# Patient Record
Sex: FEMALE | Race: WHITE | Employment: UNEMPLOYED | URBAN - METROPOLITAN AREA
[De-identification: names, ages, dates, MRNs, and addresses within clinical notes are randomized per-mention and may not be internally consistent; named-entity substitution may affect disease eponyms.]

---

## 2021-01-19 ENCOUNTER — PROCEDURE VISIT (OUTPATIENT)
Dept: AUDIOLOGY | Age: 61
End: 2021-01-19
Payer: COMMERCIAL

## 2021-01-19 ENCOUNTER — OFFICE VISIT (OUTPATIENT)
Dept: ENT CLINIC | Age: 61
End: 2021-01-19
Payer: COMMERCIAL

## 2021-01-19 VITALS
WEIGHT: 181.6 LBS | BODY MASS INDEX: 27.52 KG/M2 | HEIGHT: 68 IN | DIASTOLIC BLOOD PRESSURE: 75 MMHG | SYSTOLIC BLOOD PRESSURE: 111 MMHG | HEART RATE: 71 BPM

## 2021-01-19 DIAGNOSIS — H69.83 DYSFUNCTION OF BOTH EUSTACHIAN TUBES: Primary | ICD-10-CM

## 2021-01-19 DIAGNOSIS — H69.83 EUSTACHIAN TUBE DYSFUNCTION, BILATERAL: Primary | ICD-10-CM

## 2021-01-19 DIAGNOSIS — H65.93 BILATERAL SEROUS OTITIS MEDIA, UNSPECIFIED CHRONICITY: ICD-10-CM

## 2021-01-19 DIAGNOSIS — R09.82 POST-NASAL DRAINAGE: ICD-10-CM

## 2021-01-19 PROCEDURE — 99203 OFFICE O/P NEW LOW 30 MIN: CPT | Performed by: NURSE PRACTITIONER

## 2021-01-19 PROCEDURE — 92567 TYMPANOMETRY: CPT | Performed by: AUDIOLOGIST

## 2021-01-19 RX ORDER — MULTIVIT-MIN/IRON/FOLIC ACID/K 18-600-40
CAPSULE ORAL DAILY
COMMUNITY
End: 2021-08-04 | Stop reason: ALTCHOICE

## 2021-01-19 RX ORDER — NICOTINE POLACRILEX 2 MG
GUM BUCCAL DAILY
COMMUNITY
End: 2021-08-04 | Stop reason: ALTCHOICE

## 2021-01-19 RX ORDER — MULTIVIT WITH MINERALS/LUTEIN
250 TABLET ORAL DAILY
COMMUNITY
End: 2021-08-04 | Stop reason: ALTCHOICE

## 2021-01-19 RX ORDER — AZELASTINE 1 MG/ML
2 SPRAY, METERED NASAL 2 TIMES DAILY
Qty: 1 BOTTLE | Refills: 1 | Status: SHIPPED
Start: 2021-01-19 | End: 2022-10-13 | Stop reason: SDUPTHER

## 2021-01-19 RX ORDER — UBIDECARENONE 75 MG
50 CAPSULE ORAL DAILY
COMMUNITY
End: 2021-08-04 | Stop reason: ALTCHOICE

## 2021-01-19 SDOH — HEALTH STABILITY: MENTAL HEALTH: HOW MANY STANDARD DRINKS CONTAINING ALCOHOL DO YOU HAVE ON A TYPICAL DAY?: 3 OR 4

## 2021-01-19 SDOH — HEALTH STABILITY: MENTAL HEALTH: HOW OFTEN DO YOU HAVE A DRINK CONTAINING ALCOHOL?: NOT ASKED

## 2021-01-19 ASSESSMENT — ENCOUNTER SYMPTOMS
RHINORRHEA: 1
SINUS PRESSURE: 0
SHORTNESS OF BREATH: 0
EYES NEGATIVE: 1
RESPIRATORY NEGATIVE: 1
STRIDOR: 0
SINUS PAIN: 0

## 2021-01-19 NOTE — PROGRESS NOTES
This patient was referred for audiometric/tympanometric testing by MITA Díaz due to Eustachian tube dysfunction. Patient currently wears hearing aids purchased elsewhere. She reported noticing a decrease in hearing and a rushing sound when she blows her nose. She denied any dizziness. Tympanometry revealed flat tympanograms, bilaterally. The results were reviewed with the patient. Recommendations for follow up will be made pending physician consult.       Leo Nguyen [facility]  [address] [facility] [id number]  Electronically signed by Leo Nguyen on 1/19/2021 at 1:51 PM

## 2021-01-19 NOTE — PROGRESS NOTES
[facility] Otolaryngology  Dr. Chiki Hobson. MADHAVI Garcia Ms.Ed. New Consult       Patient Name:  Marsha Castro  :  1960     CHIEF C/O:    Chief Complaint   Patient presents with    Ear Problem     went to get hearing aides cleaned and hearing is decreased. put on claritin, flonase, rinse whhich made things worse        HISTORY OBTAINED FROM:  patient    HISTORY OF PRESENT ILLNESS:       Evangelina Busby is a 64y.o. year old female, here today for worsened hearing loss. She states her symptoms began 2 months ago. She was seen by her PCP and placed on Flonase, Claritin, and nasal saline sinus rinses. She states she noticed no difference and felt like the medications made her symptoms worse. She did stop the Claritin but she has continued to use Flonase, 1 spray each nostril once daily with intermittent use of nasal saline sinus rinses. Initially she felt like she had fluid in her ears which would pop and crack especially when blowing her nose. She does complain of chronic sinus issues including postnasal drainage and chronic rhinorrhea. Denies any sinus pain or pressure. She denies any ear pain or pressure. She does have a family history of hearing loss including her twin sister. She denies any history of ear infections or previous ear surgeries. No past medical history on file. No past surgical history on file.     Current Outpatient Medications:     Cholecalciferol (VITAMIN D) 50 MCG (2000) CAPS capsule, Take by mouth, Disp: , Rfl:     QUERCETIN PO, Take by mouth, Disp: , Rfl:     Isaac, Zingiber officinalis, (ISAAC PO), Take by mouth With black pepper, Disp: , Rfl:     Ascorbic Acid (VITAMIN C) 250 MG tablet, Take 250 mg by mouth daily, Disp: , Rfl:     vitamin B-12 (CYANOCOBALAMIN) 100 MCG tablet, Take 50 mcg by mouth daily, Disp: , Rfl:     Biotin 1 MG CAPS, Take by mouth, Disp: , Rfl:     Budesonide-Formoterol Fumarate (SYMBICORT IN), Inhale into the lungs Trying to get on good rx, Disp: , Rfl:   Adhesive tape, Augmentin [amoxicillin-pot clavulanate], Bactrim [sulfamethoxazole-trimethoprim], Cipro [ciprofloxacin hcl], Doxycycline, Iv dye [iodides], Sulfa antibiotics, Tetracyclines & related, and Vancomycin  Social History     Tobacco Use    Smoking status: Current Every Day Smoker     Packs/day: 0.50     Years: 30.00     Pack years: 15.00     Types: Cigarettes    Smokeless tobacco: Never Used   Substance Use Topics    Alcohol use: Yes     Alcohol/week: 4.0 standard drinks     Types: 4 Glasses of wine per week     Drinks per session: 3 or 4    Drug use: Never     No family history on file. Review of Systems   Constitutional: Negative. Negative for activity change and appetite change. HENT: Positive for hearing loss, postnasal drip and rhinorrhea. Negative for congestion, ear discharge, ear pain, sinus pressure and sinus pain. Eyes: Negative. Respiratory: Negative. Negative for shortness of breath and stridor. Cardiovascular: Negative. Negative for chest pain and palpitations. Endocrine: Negative. Genitourinary: Negative. Musculoskeletal: Negative. Skin: Negative. Neurological: Negative. Negative for dizziness. Hematological: Negative. Psychiatric/Behavioral: Negative. /75 (Site: Left Upper Arm, Position: Sitting, Cuff Size: Large Adult)   Pulse 71   Ht 5' 8\" (1.727 m)   Wt 181 lb 9.6 oz (82.4 kg)   BMI 27.61 kg/m²   Physical Exam  Constitutional:       Appearance: Normal appearance. HENT:      Head: Normocephalic. Right Ear: Ear canal and external ear normal. Decreased hearing noted. A middle ear effusion is present. Left Ear: Ear canal and external ear normal. Decreased hearing noted. A middle ear effusion is present. Nose: Nose normal. No congestion or rhinorrhea. Right Turbinates: Not swollen or pale. Left Turbinates: Not swollen or pale. Mouth/Throat:      Lips: Pink.       Mouth: Mucous membranes are moist.   Eyes:      Conjunctiva/sclera: Conjunctivae normal.      Pupils: Pupils are equal, round, and reactive to light. Neck:      Musculoskeletal: Normal range of motion. No neck rigidity or muscular tenderness. Cardiovascular:      Rate and Rhythm: Normal rate and regular rhythm. Pulses: Normal pulses. Pulmonary:      Effort: Pulmonary effort is normal. No respiratory distress. Breath sounds: No stridor. Musculoskeletal: Normal range of motion. Skin:     General: Skin is warm and dry. Neurological:      General: No focal deficit present. Mental Status: She is alert and oriented to person, place, and time. Psychiatric:         Mood and Affect: Mood normal.         Behavior: Behavior normal.         Thought Content: Thought content normal.         Judgment: Judgment normal.           Tympanogram reviewed with patient. Flat curves bilaterally. IMPRESSION/PLAN:      Eamon Ocasio was seen today for ear problem. Diagnoses and all orders for this visit:    Dysfunction of both eustachian tubes    Bilateral serous otitis media, unspecified chronicity    Post-nasal drainage    Other orders  -     azelastine (ASTELIN) 0.1 % nasal spray; 2 sprays by Nasal route 2 times daily Use in each nostril as directed      Eamon Ocasio is seen today for complaint of worsening hearing loss. Upon exam she is found to have significant middle ear effusions bilaterally. There is no erythema or sign of infection. Bilateral ear canals are without erythema or edema. Sinuses are patent without considerable swelling or paleness of the inferior turbinates. There is no rhinorrhea present but small amount of postnasal drainage is observed in the oropharynx. At this time the patient is to increase her Flonase to 2 sprays each nostril twice daily for 2 weeks and then once daily thereafter.   She will also be placed on Astelin spray, 2 sprays each nostril once daily at bedtime and increase to twice daily if it is improving symptoms. She will follow-up in 6 weeks. It is recommended to the patient that if no improvement is seen at this time a diagnostic myringotomy performed in the office for relief of symptoms. She is instructed to call with any new or worsening of symptoms prior to her next appointment.     SUYAPA Walker, FNP-C  [address] [facility], Nose and Throat    The information contained in this note has been dictated using drug and medical speech recognition software and may contain errors

## 2021-02-23 ENCOUNTER — PROCEDURE VISIT (OUTPATIENT)
Dept: AUDIOLOGY | Age: 61
End: 2021-02-23
Payer: COMMERCIAL

## 2021-02-23 ENCOUNTER — OFFICE VISIT (OUTPATIENT)
Dept: ENT CLINIC | Age: 61
End: 2021-02-23
Payer: COMMERCIAL

## 2021-02-23 VITALS
DIASTOLIC BLOOD PRESSURE: 82 MMHG | SYSTOLIC BLOOD PRESSURE: 114 MMHG | WEIGHT: 181 LBS | HEIGHT: 68 IN | BODY MASS INDEX: 27.43 KG/M2

## 2021-02-23 DIAGNOSIS — H69.83 DYSFUNCTION OF BOTH EUSTACHIAN TUBES: Primary | ICD-10-CM

## 2021-02-23 DIAGNOSIS — H69.83 EUSTACHIAN TUBE DYSFUNCTION, BILATERAL: Primary | ICD-10-CM

## 2021-02-23 DIAGNOSIS — H65.93 BILATERAL SEROUS OTITIS MEDIA, UNSPECIFIED CHRONICITY: ICD-10-CM

## 2021-02-23 PROCEDURE — 99214 OFFICE O/P EST MOD 30 MIN: CPT | Performed by: NURSE PRACTITIONER

## 2021-02-23 PROCEDURE — 92567 TYMPANOMETRY: CPT | Performed by: AUDIOLOGIST

## 2021-02-23 ASSESSMENT — ENCOUNTER SYMPTOMS
RESPIRATORY NEGATIVE: 1
SHORTNESS OF BREATH: 0
EYES NEGATIVE: 1
STRIDOR: 0

## 2021-02-23 NOTE — PROGRESS NOTES
[address] [facility] Otolaryngology  Dr. Kim Healy. Miguel Check. Ms.Ed        Patient Name:  Violeta Parsons  :  1960     CHIEF C/O:    Chief Complaint   Patient presents with    Follow-up     bl ear clogged       HISTORY OBTAINED FROM:  patient    HISTORY OF PRESENT ILLNESS:       Elmo Hernandez is a 64y.o. year old female, here today for follow up of fullness in bilateral ears. She states her symptoms have been continuous and unchanged for the past 3 months. She continues to use Flonase and Astelin spray with no change to her symptoms. She continues to complain of muffled hearing bilaterally which is becoming very frustrating to her. She continues to have pressure in both ears. She states that she is able to hear some with her hearing aids and but still struggles. She denies any new pain or drainage. She denies any dizziness. History reviewed. No pertinent past medical history. History reviewed. No pertinent surgical history.     Current Outpatient Medications:     Cholecalciferol (VITAMIN D) 50 MCG ( UT) CAPS capsule, Take by mouth, Disp: , Rfl:     QUERCETIN PO, Take by mouth, Disp: , Rfl:     Ascorbic Acid (VITAMIN C) 250 MG tablet, Take 250 mg by mouth daily, Disp: , Rfl:     Biotin 1 MG CAPS, Take by mouth, Disp: , Rfl:     azelastine (ASTELIN) 0.1 % nasal spray, 2 sprays by Nasal route 2 times daily Use in each nostril as directed, Disp: 1 Bottle, Rfl: 1    Isaac, Zingiber officinalis, (ISAAC PO), Take by mouth With black pepper, Disp: , Rfl:     vitamin B-12 (CYANOCOBALAMIN) 100 MCG tablet, Take 50 mcg by mouth daily, Disp: , Rfl:     Budesonide-Formoterol Fumarate (SYMBICORT IN), Inhale into the lungs Trying to get on good rx, Disp: , Rfl:   Adhesive tape, Augmentin [amoxicillin-pot clavulanate], Bactrim [sulfamethoxazole-trimethoprim], Cipro [ciprofloxacin hcl], Doxycycline, Iv dye [iodides], Sulfa antibiotics, Tetracyclines & related, and Vancomycin  Social History     Tobacco Use    Smoking status: Current Every Day Smoker     Packs/day: 0.50     Years: 30.00     Pack years: 15.00     Types: Cigarettes    Smokeless tobacco: Never Used   Substance Use Topics    Alcohol use: Yes     Alcohol/week: 4.0 standard drinks     Types: 4 Glasses of wine per week     Drinks per session: 3 or 4    Drug use: Never     History reviewed. No pertinent family history. Review of Systems   Constitutional: Negative. Negative for activity change and appetite change. HENT: Positive for ear pain (Pressure), hearing loss and postnasal drip. Eyes: Negative. Respiratory: Negative. Negative for shortness of breath and stridor. Cardiovascular: Negative. Negative for chest pain and palpitations. Endocrine: Negative. Genitourinary: Negative. Musculoskeletal: Negative. Skin: Negative. Neurological: Negative. Negative for dizziness. Hematological: Negative. Psychiatric/Behavioral: Negative. /82 (Site: Right Upper Arm, Position: Sitting, Cuff Size: Large Adult)   Ht 5' 8\" (1.727 m)   Wt 181 lb (82.1 kg)   BMI 27.52 kg/m²   Physical Exam  Constitutional:       Appearance: Normal appearance. HENT:      Head: Normocephalic. Right Ear: Ear canal and external ear normal. Decreased hearing noted. A middle ear effusion is present. Left Ear: Ear canal and external ear normal. Decreased hearing noted. A middle ear effusion is present. Nose: Nose normal. No congestion or rhinorrhea. Right Turbinates: Not pale. Left Turbinates: Not pale. Mouth/Throat:      Lips: Pink. Mouth: Mucous membranes are moist.     Eyes:      Conjunctiva/sclera: Conjunctivae normal.      Pupils: Pupils are equal, round, and reactive to light. Neck:      Musculoskeletal: Normal range of motion. No neck rigidity or muscular tenderness. Cardiovascular:      Rate and Rhythm: Normal rate and regular rhythm. Pulses: Normal pulses.    Pulmonary:      Effort: Pulmonary effort is normal. No respiratory distress. Breath sounds: No stridor. Musculoskeletal: Normal range of motion. Skin:     General: Skin is warm and dry. Neurological:      General: No focal deficit present. Mental Status: She is alert and oriented to person, place, and time. Psychiatric:         Mood and Affect: Mood normal.         Behavior: Behavior normal.         Thought Content: Thought content normal.         Judgment: Judgment normal.           Tympanogram reviewed with patient. Flat curves bilaterally. IMPRESSION/PLAN:    Mendel Galley was seen today for follow-up. Diagnoses and all orders for this visit:    Bilateral serous otitis media, unspecified chronicity    Dysfunction of both eustachian tubes      Mendel Galley is seen today for follow-up for chronic otitis media with effusion. Upon exam bilateral TMs show sign of middle ear effusion without other sign of infection. There is no erythema or edema of the ear canal.  Sinuses are patent without considerable swelling at this time. There is mild postnasal drainage remaining in the oropharynx. Options for further treatment are discussed with the patient including diagnostic myringotomy in the office versus bilateral myringotomy with tympanostomy tube placement and possible eustachian tube balloon dilation. Risks and benefits of these procedures are discussed with the patient who would like to proceed at this time with BMT with eustachian tube balloon dilation. She will be scheduled with Dr. Isaak Potter at [facility] surgery center for her procedure with follow-up 1 week after the procedure. Until that time she is to continue with her current regimen of Flonase and Astelin sprays as well as nasal saline sinus rinses. She is instructed to call with any new or worsening of symptoms prior to her procedure.     Verna Simon, MSN, FNP-C  8 [facility], Nose and Throat    The information contained in this note has been dictated using drug and medical speech recognition software and may contain errors

## 2021-02-23 NOTE — PROGRESS NOTES
This patient was referred for audiometric/tympanometric testing by MITA Díaz due to Eustachian tube dysfunction, bilateral. Patient reported no improvement in symptoms. Tympanometry revealed negative middle ear pressure (-255 daPa), in the right ear and negative middle ear pressure (-160 daPa), in the left ear. The results were reviewed with the patient. Recommendations for follow up will be made pending physician consult.       Leo Nguyen [facility]  [address] [facility] [id number]  Electronically signed by Leo Nguyen on 2/23/2021 at 10:42 AM

## 2021-03-04 ENCOUNTER — ANESTHESIA EVENT (OUTPATIENT)
Dept: OPERATING ROOM | Age: 61
End: 2021-03-04
Payer: COMMERCIAL

## 2021-03-05 ENCOUNTER — HOSPITAL ENCOUNTER (OUTPATIENT)
Age: 61
Discharge: HOME | End: 2021-03-07
Payer: COMMERCIAL

## 2021-03-05 ENCOUNTER — HOSPITAL ENCOUNTER (OUTPATIENT)
Age: 61
Discharge: HOME | End: 2021-03-05
Payer: COMMERCIAL

## 2021-03-05 DIAGNOSIS — H69.83 CHRONIC DYSFUNCTION OF BOTH EUSTACHIAN TUBES: ICD-10-CM

## 2021-03-05 LAB
EKG ATRIAL RATE: 72 BPM
EKG P AXIS: 65 DEGREES
EKG P-R INTERVAL: 134 MS
EKG Q-T INTERVAL: 410 MS
EKG QRS DURATION: 80 MS
EKG QTC CALCULATION (BAZETT): 448 MS
EKG R AXIS: 32 DEGREES
EKG T AXIS: 76 DEGREES
EKG VENTRICULAR RATE: 72 BPM

## 2021-03-05 PROCEDURE — 93005 ELECTROCARDIOGRAM TRACING: CPT | Performed by: ANESTHESIOLOGY

## 2021-03-05 PROCEDURE — U0003 INFECTIOUS AGENT DETECTION BY NUCLEIC ACID (DNA OR RNA); SEVERE ACUTE RESPIRATORY SYNDROME CORONAVIRUS 2 (SARS-COV-2) (CORONAVIRUS DISEASE [COVID-19]), AMPLIFIED PROBE TECHNIQUE, MAKING USE OF HIGH THROUGHPUT TECHNOLOGIES AS DESCRIBED BY CMS-2020-01-R: HCPCS

## 2021-03-07 LAB — SARS-COV-2, PCR: NOT DETECTED

## 2021-03-07 ASSESSMENT — ENCOUNTER SYMPTOMS
RESPIRATORY NEGATIVE: 1
SHORTNESS OF BREATH: 0
EYES NEGATIVE: 1
STRIDOR: 0

## 2021-03-07 NOTE — H&P
[facility] Otolaryngology  Dr. Zachariah Estrella. Walker Iniguez. Ms.Ed        Patient Name:  Breanne Lambert  :  1960     CHIEF C/O:    No chief complaint on file. HISTORY OBTAINED FROM:  patient    HISTORY OF PRESENT ILLNESS:       Mendel Galley is a 64y.o. year old female, here today for follow up of fullness in bilateral ears. She states her symptoms have been continuous and unchanged for the past 3 months. She continues to use Flonase and Astelin spray with no change to her symptoms. She continues to complain of muffled hearing bilaterally which is becoming very frustrating to her. She continues to have pressure in both ears. She states that she is able to hear some with her hearing aids and but still struggles. She denies any new pain or drainage. She denies any dizziness. Past Medical History:   Diagnosis Date    Cancer [facility]) dx     melanoma left hip (had excision, chemo but no radiation)    COPD (chronic obstructive pulmonary disease) ([facility])     states cant afford her symbicort inhaler    Hx of blood clots     DVT rt leg while going thru chemo (was inactive)    Ulcerative colitis ([facility] [address]      Past Surgical History:   Procedure Laterality Date    ABDOMEN SURGERY      multiple d/t severe ulcerative colitis  rectum & lge intestine has been removed . only has 4 feet of small intestine left. ...has an internal BCIR([place] internal resevoir)was unable to tolerate external d/t severe ahesive allergy.  has to self catherize her bowel every couple of hrs   [address]    upper jaw (has 4 wires holding her jaw up)    OTHER SURGICAL HISTORY      excision melanoma left hip    SKIN BIOPSY         Current Outpatient Medications:     azelastine (ASTELIN) 0.1 % nasal spray, 2 sprays by Nasal route 2 times daily Use in each nostril as directed, Disp: 1 Bottle, Rfl: 1    Cholecalciferol (VITAMIN D) 50 MCG ( UT) CAPS capsule, Take by mouth daily , Disp: , Rfl:    QUERCETIN PO, Take by mouth daily , Disp: , Rfl:     Teena, Zingiber officinalis, (TEENA PO), Take by mouth daily With black pepper , Disp: , Rfl:     Ascorbic Acid (VITAMIN C) 250 MG tablet, Take 250 mg by mouth daily, Disp: , Rfl:     vitamin B-12 (CYANOCOBALAMIN) 100 MCG tablet, Take 50 mcg by mouth daily, Disp: , Rfl:     Biotin 1 MG CAPS, Take by mouth daily , Disp: , Rfl:   Adhesive tape, Augmentin [amoxicillin-pot clavulanate], Bactrim [sulfamethoxazole-trimethoprim], Cipro [ciprofloxacin hcl], Doxycycline, Sulfa antibiotics, Tetracyclines & related, Vancomycin, and Iv dye [iodides]  Social History     Tobacco Use    Smoking status: Current Every Day Smoker     Packs/day: 0.50     Years: 30.00     Pack years: 15.00     Types: Cigarettes    Smokeless tobacco: Never Used   Substance Use Topics    Alcohol use: Yes     Drinks per session: 3 or 4     Comment: wine 4 x per week    Drug use: Never     History reviewed. No pertinent family history. Review of Systems   Constitutional: Negative. Negative for activity change and appetite change. HENT: Positive for ear pain (Pressure), hearing loss and postnasal drip. Eyes: Negative. Respiratory: Negative. Negative for shortness of breath and stridor. Cardiovascular: Negative. Negative for chest pain and palpitations. Endocrine: Negative. Genitourinary: Negative. Musculoskeletal: Negative. Skin: Negative. Neurological: Negative. Negative for dizziness. Hematological: Negative. Psychiatric/Behavioral: Negative. /82 (Site: Right Upper Arm, Position: Sitting, Cuff Size: Large Adult)   Ht 5' 8\" (1.727 m)   Wt 181 lb (82.1 kg)   BMI 27.52 kg/m²   Physical Exam  Constitutional:       Appearance: Normal appearance. HENT:      Head: Normocephalic. Right Ear: Ear canal and external ear normal. Decreased hearing noted. A middle ear effusion is present.       Left Ear: Ear canal and external ear normal. Decreased hearing noted. A middle ear effusion is present. Nose: Nose normal. No congestion or rhinorrhea. Right Turbinates: Not pale. Left Turbinates: Not pale. Mouth/Throat:      Lips: Pink. Mouth: Mucous membranes are moist.     Eyes:      Conjunctiva/sclera: Conjunctivae normal.      Pupils: Pupils are equal, round, and reactive to light. Neck:      Musculoskeletal: Normal range of motion. No neck rigidity or muscular tenderness. Cardiovascular:      Rate and Rhythm: Normal rate and regular rhythm. Pulses: Normal pulses. Pulmonary:      Effort: Pulmonary effort is normal. No respiratory distress. Breath sounds: No stridor. Musculoskeletal: Normal range of motion. Skin:     General: Skin is warm and dry. Neurological:      General: No focal deficit present. Mental Status: She is alert and oriented to person, place, and time. Psychiatric:         Mood and Affect: Mood normal.         Behavior: Behavior normal.         Thought Content: Thought content normal.         Judgment: Judgment normal.           Tympanogram reviewed with patient. Flat curves bilaterally. IMPRESSION/PLAN:    Wang Bryant was seen today for follow-up. Diagnoses and all orders for this visit:    Bilateral serous otitis media, unspecified chronicity    Dysfunction of both eustachian tubes      Wang Bryant is seen today for follow-up for chronic otitis media with effusion. Upon exam bilateral TMs show sign of middle ear effusion without other sign of infection. There is no erythema or edema of the ear canal.  Sinuses are patent without considerable swelling at this time. There is mild postnasal drainage remaining in the oropharynx. Options for further treatment are discussed with the patient including diagnostic myringotomy in the office versus bilateral myringotomy with tympanostomy tube placement and possible eustachian tube balloon dilation.   Risks and benefits of these procedures are discussed

## 2021-03-09 NOTE — ANESTHESIA PRE PROCEDURE
Department of Anesthesiology  Preprocedure Note       Name:  Vania Espinoza   Age:  64 y.o.  :  1960                                          MRN:  [id number]         Date:  3/9/2021      Surgeon: Audelia Castaneda):  Jocelyn Crouch DO    Procedure: Procedure(s):  BILATERAL MYRINGOTOMY  WITH TUBE, BILATERAL EUSTACHIAN TUBE BALLOON DILATION (CPT [id number], 88126)    Medications prior to admission:   Prior to Admission medications    Medication Sig Start Date End Date Taking? Authorizing Provider   azelastine (ASTELIN) 0.1 % nasal spray 2 sprays by Nasal route 2 times daily Use in each nostril as directed 21  Yes Charmayne Fess, APRN - CNP   Cholecalciferol (VITAMIN D) 50 MCG ( UT) CAPS capsule Take by mouth daily     Historical Provider, MD   QUERCETIN PO Take by mouth daily     Historical Provider, Ruben Marinelli, (ISAAC PO) Take by mouth daily With black pepper     Historical Provider, MD   Ascorbic Acid (VITAMIN C) 250 MG tablet Take 250 mg by mouth daily    Historical Provider, MD   vitamin B-12 (CYANOCOBALAMIN) 100 MCG tablet Take 50 mcg by mouth daily    Historical Provider, MD   Biotin 1 MG CAPS Take by mouth daily     Historical Provider, MD       Current medications:    No current facility-administered medications for this encounter. Current Outpatient Medications   Medication Sig Dispense Refill    azelastine (ASTELIN) 0.1 % nasal spray 2 sprays by Nasal route 2 times daily Use in each nostril as directed 1 Bottle 1    Cholecalciferol (VITAMIN D) 50 MCG ( UT) CAPS capsule Take by mouth daily       QUERCETIN PO Take by mouth daily       IsaacSteven aliceaiber officinalis, (ISAAC PO) Take by mouth daily With black pepper       Ascorbic Acid (VITAMIN C) 250 MG tablet Take 250 mg by mouth daily      vitamin B-12 (CYANOCOBALAMIN) 100 MCG tablet Take 50 mcg by mouth daily      Biotin 1 MG CAPS Take by mouth daily          Allergies:     Allergies   Allergen Reactions    BP Readings from Last 3 Encounters:   02/23/21 114/82   01/19/21 111/75       NPO Status:  >8.H                                                                               BMI:   Wt Readings from Last 3 Encounters:   02/23/21 181 lb (82.1 kg)   01/19/21 181 lb 9.6 oz (82.4 kg)     Body mass index is 27.52 kg/m². CBC: No results found for: WBC, RBC, HGB, HCT, MCV, RDW, PLT    CMP: No results found for: NA, K, CL, CO2, BUN, CREATININE, GFRAA, AGRATIO, LABGLOM, GLUCOSE, PROT, CALCIUM, BILITOT, ALKPHOS, AST, ALT    POC Tests: No results for input(s): POCGLU, POCNA, POCK, POCCL, POCBUN, POCHEMO, POCHCT in the last 72 hours. Coags: No results found for: PROTIME, INR, APTT    HCG (If Applicable): No results found for: PREGTESTUR, PREGSERUM, HCG, HCGQUANT     ABGs: No results found for: PHART, PO2ART, HCQ5RVF, BVZ7UUV, BEART, T0XNMCUV     Type & Screen (If Applicable):  No results found for: LABABO, LABRH    Drug/Infectious Status (If Applicable):  No results found for: HIV, HEPCAB    COVID-19 Screening (If Applicable):   Lab Results   Component Value Date    COVID19 Not Detected 03/05/2021         Anesthesia Evaluation  Patient summary reviewed and Nursing notes reviewed no history of anesthetic complications:   Airway: Mallampati: I  TM distance: >3 FB   Neck ROM: full  Mouth opening: > = 3 FB Dental: normal exam         Pulmonary:   (+) COPD:  wheezes (occasional insp. squeek),  current smoker (15 pk yrs)          Patient did not smoke on day of surgery. Cardiovascular:  Exercise tolerance: good (>4 METS),           Rhythm: regular  Rate: normal                    Neuro/Psych:               GI/Hepatic/Renal:   (+) PUD,           Endo/Other:                      ROS comment: B/L eustachian tube dysfunction Abdominal:         (-) obese     Vascular:   + DVT, . Anesthesia Plan      general     ASA 3       Induction: intravenous. MIPS: Prophylactic antiemetics administered. Anesthetic plan and risks discussed with patient. Plan discussed with CRNA.                 Mary Estrada DO   3/9/2021        Patient will need to be re-evaluated prior to surgery by DOS anesthesiologist.    Mary Estrada DO           3/9/2021        8:39 AM

## 2021-03-11 ENCOUNTER — HOSPITAL ENCOUNTER (OUTPATIENT)
Age: 61
Setting detail: OUTPATIENT SURGERY
Discharge: HOME | End: 2021-03-11
Attending: OTOLARYNGOLOGY | Admitting: OTOLARYNGOLOGY
Payer: COMMERCIAL

## 2021-03-11 ENCOUNTER — ANESTHESIA (OUTPATIENT)
Dept: OPERATING ROOM | Age: 61
End: 2021-03-11
Payer: COMMERCIAL

## 2021-03-11 VITALS
BODY MASS INDEX: 27.43 KG/M2 | OXYGEN SATURATION: 97 % | HEART RATE: 64 BPM | SYSTOLIC BLOOD PRESSURE: 124 MMHG | DIASTOLIC BLOOD PRESSURE: 71 MMHG | TEMPERATURE: 97 F | RESPIRATION RATE: 16 BRPM | WEIGHT: 181 LBS | HEIGHT: 68 IN

## 2021-03-11 VITALS
OXYGEN SATURATION: 97 % | RESPIRATION RATE: 15 BRPM | DIASTOLIC BLOOD PRESSURE: 69 MMHG | SYSTOLIC BLOOD PRESSURE: 115 MMHG

## 2021-03-11 DIAGNOSIS — H69.83 CHRONIC DYSFUNCTION OF BOTH EUSTACHIAN TUBES: Primary | ICD-10-CM

## 2021-03-11 PROCEDURE — 69436 CREATE EARDRUM OPENING: CPT | Performed by: OTOLARYNGOLOGY

## 2021-03-11 PROCEDURE — C1726 CATH, BAL DIL, NON-VASCULAR: HCPCS | Performed by: OTOLARYNGOLOGY

## 2021-03-11 PROCEDURE — 2780000010 HC IMPLANT OTHER: Performed by: OTOLARYNGOLOGY

## 2021-03-11 PROCEDURE — 2709999900 HC NON-CHARGEABLE SUPPLY: Performed by: OTOLARYNGOLOGY

## 2021-03-11 PROCEDURE — 6370000000 HC RX 637 (ALT 250 FOR IP): Performed by: ANESTHESIOLOGY

## 2021-03-11 PROCEDURE — 7100000000 HC PACU RECOVERY - FIRST 15 MIN: Performed by: OTOLARYNGOLOGY

## 2021-03-11 PROCEDURE — 7100000011 HC PHASE II RECOVERY - ADDTL 15 MIN: Performed by: OTOLARYNGOLOGY

## 2021-03-11 PROCEDURE — 3600000002 HC SURGERY LEVEL 2 BASE: Performed by: OTOLARYNGOLOGY

## 2021-03-11 PROCEDURE — 7100000010 HC PHASE II RECOVERY - FIRST 15 MIN: Performed by: OTOLARYNGOLOGY

## 2021-03-11 PROCEDURE — 3700000000 HC ANESTHESIA ATTENDED CARE: Performed by: OTOLARYNGOLOGY

## 2021-03-11 PROCEDURE — 6370000000 HC RX 637 (ALT 250 FOR IP): Performed by: OTOLARYNGOLOGY

## 2021-03-11 PROCEDURE — 2580000003 HC RX 258: Performed by: ANESTHESIOLOGY

## 2021-03-11 PROCEDURE — 69706 NPS SURG DILAT EUST TUBE BI: CPT | Performed by: OTOLARYNGOLOGY

## 2021-03-11 PROCEDURE — 6360000002 HC RX W HCPCS: Performed by: NURSE ANESTHETIST, CERTIFIED REGISTERED

## 2021-03-11 PROCEDURE — 2500000003 HC RX 250 WO HCPCS: Performed by: NURSE ANESTHETIST, CERTIFIED REGISTERED

## 2021-03-11 PROCEDURE — 3600000012 HC SURGERY LEVEL 2 ADDTL 15MIN: Performed by: OTOLARYNGOLOGY

## 2021-03-11 PROCEDURE — 7100000001 HC PACU RECOVERY - ADDTL 15 MIN: Performed by: OTOLARYNGOLOGY

## 2021-03-11 PROCEDURE — 3700000001 HC ADD 15 MINUTES (ANESTHESIA): Performed by: OTOLARYNGOLOGY

## 2021-03-11 DEVICE — IMPLANTABLE DEVICE: Type: IMPLANTABLE DEVICE | Site: EAR | Status: FUNCTIONAL

## 2021-03-11 RX ORDER — SODIUM CHLORIDE 0.9 % (FLUSH) 0.9 %
10 SYRINGE (ML) INJECTION PRN
Status: DISCONTINUED | OUTPATIENT
Start: 2021-03-11 | End: 2021-03-11 | Stop reason: HOSPADM

## 2021-03-11 RX ORDER — FAMOTIDINE 20 MG/1
20 TABLET, FILM COATED ORAL ONCE
Status: DISCONTINUED | OUTPATIENT
Start: 2021-03-11 | End: 2021-03-11 | Stop reason: HOSPADM

## 2021-03-11 RX ORDER — DEXAMETHASONE SODIUM PHOSPHATE 10 MG/ML
INJECTION, SOLUTION INTRAMUSCULAR; INTRAVENOUS PRN
Status: DISCONTINUED | OUTPATIENT
Start: 2021-03-11 | End: 2021-03-11 | Stop reason: SDUPTHER

## 2021-03-11 RX ORDER — MORPHINE SULFATE 2 MG/ML
1 INJECTION, SOLUTION INTRAMUSCULAR; INTRAVENOUS EVERY 5 MIN PRN
Status: DISCONTINUED | OUTPATIENT
Start: 2021-03-11 | End: 2021-03-11 | Stop reason: HOSPADM

## 2021-03-11 RX ORDER — PROMETHAZINE HYDROCHLORIDE 25 MG/ML
25 INJECTION, SOLUTION INTRAMUSCULAR; INTRAVENOUS
Status: DISCONTINUED | OUTPATIENT
Start: 2021-03-11 | End: 2021-03-11 | Stop reason: HOSPADM

## 2021-03-11 RX ORDER — ONDANSETRON 2 MG/ML
INJECTION INTRAMUSCULAR; INTRAVENOUS PRN
Status: DISCONTINUED | OUTPATIENT
Start: 2021-03-11 | End: 2021-03-11 | Stop reason: SDUPTHER

## 2021-03-11 RX ORDER — LIDOCAINE HYDROCHLORIDE 5 MG/ML
INJECTION, SOLUTION INFILTRATION; INTRAVENOUS PRN
Status: DISCONTINUED | OUTPATIENT
Start: 2021-03-11 | End: 2021-03-11 | Stop reason: SDUPTHER

## 2021-03-11 RX ORDER — FENTANYL CITRATE 50 UG/ML
50 INJECTION, SOLUTION INTRAMUSCULAR; INTRAVENOUS EVERY 5 MIN PRN
Status: DISCONTINUED | OUTPATIENT
Start: 2021-03-11 | End: 2021-03-11 | Stop reason: HOSPADM

## 2021-03-11 RX ORDER — LABETALOL HYDROCHLORIDE 5 MG/ML
5 INJECTION, SOLUTION INTRAVENOUS EVERY 10 MIN PRN
Status: DISCONTINUED | OUTPATIENT
Start: 2021-03-11 | End: 2021-03-11 | Stop reason: HOSPADM

## 2021-03-11 RX ORDER — MIDAZOLAM HYDROCHLORIDE 1 MG/ML
INJECTION INTRAMUSCULAR; INTRAVENOUS PRN
Status: DISCONTINUED | OUTPATIENT
Start: 2021-03-11 | End: 2021-03-11 | Stop reason: SDUPTHER

## 2021-03-11 RX ORDER — FENTANYL CITRATE 50 UG/ML
INJECTION, SOLUTION INTRAMUSCULAR; INTRAVENOUS PRN
Status: DISCONTINUED | OUTPATIENT
Start: 2021-03-11 | End: 2021-03-11 | Stop reason: SDUPTHER

## 2021-03-11 RX ORDER — CIPROFLOXACIN AND DEXAMETHASONE 3; 1 MG/ML; MG/ML
3 SUSPENSION/ DROPS AURICULAR (OTIC) 2 TIMES DAILY
Qty: 7.5 ML | Refills: 1 | Status: SHIPPED | OUTPATIENT
Start: 2021-03-11 | End: 2021-03-14

## 2021-03-11 RX ORDER — FLUTICASONE PROPIONATE 50 MCG
2 SPRAY, SUSPENSION (ML) NASAL DAILY
Qty: 1 BOTTLE | Refills: 3 | Status: SHIPPED | OUTPATIENT
Start: 2021-03-11 | End: 2021-06-24 | Stop reason: SDUPTHER

## 2021-03-11 RX ORDER — OFLOXACIN 3 MG/ML
SOLUTION/ DROPS OPHTHALMIC PRN
Status: DISCONTINUED | OUTPATIENT
Start: 2021-03-11 | End: 2021-03-11 | Stop reason: ALTCHOICE

## 2021-03-11 RX ORDER — MEPERIDINE HYDROCHLORIDE 25 MG/ML
12.5 INJECTION INTRAMUSCULAR; INTRAVENOUS; SUBCUTANEOUS EVERY 5 MIN PRN
Status: DISCONTINUED | OUTPATIENT
Start: 2021-03-11 | End: 2021-03-11 | Stop reason: HOSPADM

## 2021-03-11 RX ORDER — SODIUM CHLORIDE 0.9 % (FLUSH) 0.9 %
10 SYRINGE (ML) INJECTION EVERY 12 HOURS SCHEDULED
Status: DISCONTINUED | OUTPATIENT
Start: 2021-03-11 | End: 2021-03-11 | Stop reason: HOSPADM

## 2021-03-11 RX ORDER — OXYMETAZOLINE HYDROCHLORIDE 0.05 G/100ML
2 SPRAY NASAL
Status: DISCONTINUED | OUTPATIENT
Start: 2021-03-11 | End: 2021-03-11 | Stop reason: HOSPADM

## 2021-03-11 RX ORDER — HYDROCODONE BITARTRATE AND ACETAMINOPHEN 5; 325 MG/1; MG/1
1 TABLET ORAL PRN
Status: COMPLETED | OUTPATIENT
Start: 2021-03-11 | End: 2021-03-11

## 2021-03-11 RX ORDER — HYDROCODONE BITARTRATE AND ACETAMINOPHEN 5; 325 MG/1; MG/1
2 TABLET ORAL PRN
Status: COMPLETED | OUTPATIENT
Start: 2021-03-11 | End: 2021-03-11

## 2021-03-11 RX ORDER — DIPHENHYDRAMINE HYDROCHLORIDE 50 MG/ML
12.5 INJECTION INTRAMUSCULAR; INTRAVENOUS
Status: DISCONTINUED | OUTPATIENT
Start: 2021-03-11 | End: 2021-03-11 | Stop reason: HOSPADM

## 2021-03-11 RX ORDER — HYDRALAZINE HYDROCHLORIDE 20 MG/ML
5 INJECTION INTRAMUSCULAR; INTRAVENOUS EVERY 10 MIN PRN
Status: DISCONTINUED | OUTPATIENT
Start: 2021-03-11 | End: 2021-03-11 | Stop reason: HOSPADM

## 2021-03-11 RX ORDER — SODIUM CHLORIDE, SODIUM LACTATE, POTASSIUM CHLORIDE, CALCIUM CHLORIDE 600; 310; 30; 20 MG/100ML; MG/100ML; MG/100ML; MG/100ML
INJECTION, SOLUTION INTRAVENOUS CONTINUOUS
Status: DISCONTINUED | OUTPATIENT
Start: 2021-03-11 | End: 2021-03-11 | Stop reason: HOSPADM

## 2021-03-11 RX ORDER — PROPOFOL 10 MG/ML
INJECTION, EMULSION INTRAVENOUS PRN
Status: DISCONTINUED | OUTPATIENT
Start: 2021-03-11 | End: 2021-03-11 | Stop reason: SDUPTHER

## 2021-03-11 RX ORDER — METOCLOPRAMIDE 10 MG/1
10 TABLET ORAL ONCE
Status: DISCONTINUED | OUTPATIENT
Start: 2021-03-11 | End: 2021-03-11 | Stop reason: HOSPADM

## 2021-03-11 RX ADMIN — HYDROCODONE BITARTRATE AND ACETAMINOPHEN 1 TABLET: 5; 325 TABLET ORAL at 15:09

## 2021-03-11 RX ADMIN — PROPOFOL 200 MG: 10 INJECTION, EMULSION INTRAVENOUS at 14:07

## 2021-03-11 RX ADMIN — FENTANYL CITRATE 50 MCG: 50 INJECTION, SOLUTION INTRAMUSCULAR; INTRAVENOUS at 14:16

## 2021-03-11 RX ADMIN — FENTANYL CITRATE 50 MCG: 50 INJECTION, SOLUTION INTRAMUSCULAR; INTRAVENOUS at 14:10

## 2021-03-11 RX ADMIN — FENTANYL CITRATE 50 MCG: 50 INJECTION, SOLUTION INTRAMUSCULAR; INTRAVENOUS at 14:07

## 2021-03-11 RX ADMIN — FENTANYL CITRATE 50 MCG: 50 INJECTION, SOLUTION INTRAMUSCULAR; INTRAVENOUS at 14:28

## 2021-03-11 RX ADMIN — DEXAMETHASONE SODIUM PHOSPHATE 10 MG: 10 INJECTION, SOLUTION INTRAMUSCULAR; INTRAVENOUS at 14:07

## 2021-03-11 RX ADMIN — SODIUM CHLORIDE, POTASSIUM CHLORIDE, SODIUM LACTATE AND CALCIUM CHLORIDE: 600; 310; 30; 20 INJECTION, SOLUTION INTRAVENOUS at 14:02

## 2021-03-11 RX ADMIN — SODIUM CHLORIDE, POTASSIUM CHLORIDE, SODIUM LACTATE AND CALCIUM CHLORIDE: 600; 310; 30; 20 INJECTION, SOLUTION INTRAVENOUS at 13:27

## 2021-03-11 RX ADMIN — LIDOCAINE HYDROCHLORIDE 5 ML: 5 INJECTION, SOLUTION INFILTRATION; INTRAVENOUS at 14:07

## 2021-03-11 RX ADMIN — MIDAZOLAM 2 MG: 1 INJECTION INTRAMUSCULAR; INTRAVENOUS at 14:02

## 2021-03-11 RX ADMIN — ONDANSETRON 4 MG: 2 INJECTION INTRAMUSCULAR; INTRAVENOUS at 14:07

## 2021-03-11 ASSESSMENT — PAIN DESCRIPTION - ORIENTATION
ORIENTATION: LEFT
ORIENTATION: LEFT

## 2021-03-11 ASSESSMENT — PULMONARY FUNCTION TESTS
PIF_VALUE: 0
PIF_VALUE: 2
PIF_VALUE: 0
PIF_VALUE: 10
PIF_VALUE: 1
PIF_VALUE: 0
PIF_VALUE: 1
PIF_VALUE: 3
PIF_VALUE: 15
PIF_VALUE: 2

## 2021-03-11 ASSESSMENT — PAIN - FUNCTIONAL ASSESSMENT: PAIN_FUNCTIONAL_ASSESSMENT: 0-10

## 2021-03-11 ASSESSMENT — PAIN DESCRIPTION - PAIN TYPE
TYPE: SURGICAL PAIN
TYPE: SURGICAL PAIN

## 2021-03-11 ASSESSMENT — PAIN DESCRIPTION - LOCATION
LOCATION: EAR
LOCATION: EAR

## 2021-03-11 ASSESSMENT — PAIN DESCRIPTION - DESCRIPTORS: DESCRIPTORS: SORE

## 2021-03-11 ASSESSMENT — LIFESTYLE VARIABLES: SMOKING_STATUS: 1

## 2021-03-11 NOTE — OP NOTE
Operative Note      Patient: Yodit Doty  YOB: 1960  MRN: [id number]    Date of Procedure: 3/11/2021    Pre-Op Diagnosis: CHRONIC OTITIS MEDIA WITH EFFUSION AND EUSTACHIAN TUBE DYSFUNCTION    Post-Op Diagnosis: Same       Procedure(s):  BILATERAL MYRINGOTOMY  WITH TUBE, BILATERAL EUSTACHIAN TUBE BALLOON DILATION (CPT W6650626, 85396)    Surgeon(s):  Remington Hartley DO    Assistant:   * No surgical staff found *    Anesthesia: General    Estimated Blood Loss (mL): Minimal    Complications: None    Specimens:   * No specimens in log *    Implants:  Implant Name Type Inv. Item Serial No.  Lot No. LRB No. Used Action   TUBE VENT L12MM [id number]. 2MM EAR FLROPLAS SPL FEUERSTEIN  TUBE VENT L12MM [id number]. 2MM EAR FLROPLAS SPL [facility]-WD [id number] Left 1 Implanted   TUBE VENT L12MM [id number]. 2MM EAR FLROPLAS SPL [place]  TUBE VENT L12MM [id number]. 2MM EAR FLROPLAS [facility]PL [facility] [id number] Right 1 Implanted         Drains: * No LDAs found *    INDICATIONS FOR PROCEDURE:  The patient is a 64 y.o. female with a  history of eustachian tube and middle ear effusion requiring tympanostomy  tube placement in the bilateral ears. Eustachian tube balloon dilation was recommended. The risks,benefits, and alternatives of procedure were discussed with the patient who expressed understanding and agreed to proceed.     DESCRIPTION OF PROCEDURE:  The patient was brought into the OR and placed  supine on the operative table. Anesthesia was then obtained  without complication per the anesthesia record. The patient was then  prepped and draped in usual fashion. The procedure went forth under strict  aseptic techniques.     First, two 4% cocaine-soaked pledgets were inserted into each nasal cavity. These were allowed to work for 2 minutes and then removed.       Tube placement  Right  A microscope was brought in and a speculum was placed in the right ear and the external auditory canal was cleared of cerumen with a curette. With the tympanic membrane visualized, there was not infection and was not effusion present. A myringotomy knife was used to make an incision in the anterior-inferior portion of the TM. Effusion was removed with a #3 Tucker tip suction until the middle ear space was cleared. A Feuerstein  tube was place in the incision. Left  A microscope was brought in and a speculum was placed in the left ear and the external auditory canal was cleared of cerumen with a curette. With the tympanic membrane visualized, there was not infection/ was not effusion present. A myringotomy knife was used to make an incision in the anterior-inferior portion of the TM. Effusion was removed with a #3 Tucker tip suction until the middle ear space was cleared. A  Feuerstein tube was place in the incision. Left ETD  A 30-degree endoscope was inserted into the left nasal cavity. It was  advanced with the balloon unit to the posterior nasopharynx where the  orifice of the eustachian tube was evident on the left side. The balloon  was then inserted into the eustachian tube and advanced until a hard stop  was felt. The yellow band on the eustachian tube unit was noted to be just  exterior to the eustachian tube lumen. The eustachian tube balloon was  then inflated to 12 atmospheres pressure and held for 2 minutes. The  balloon unit was observed to dilate the eustachian tube and then was let  down. The balloon unit was then removed. Right ETD  A 30-degree endoscope was inserted into the right nasal cavity. It was  advanced with the balloon unit to the posterior nasopharynx where the  orifice of the eustachian tube was evident on the right side. The balloon  was then inserted into the eustachian tube and advanced until a hard stop  was felt. The yellow band on the eustachian tube unit was noted to be just  exterior to the eustachian tube lumen.   The eustachian tube balloon was  then inflated to 12 atmospheres pressure and held for 2 minutes. The  balloon unit was observed to dilate the eustachian tube and then was let  down. The balloon unit was then removed. Care was sent back to anesthesia for appropriate awakening. The patient tolerated the procedure well and without complication. All counts were reported as correct x2.         DISPOSITION:  The patient is expected to be discharged home today following  appropriate recovery in the PACU.     Electronically signed by Ramon Wilkinson DO on 3/11/2021 at 2:17 PM

## 2021-03-15 ENCOUNTER — TELEPHONE (OUTPATIENT)
Dept: ENT CLINIC | Age: 61
End: 2021-03-15

## 2021-03-15 NOTE — TELEPHONE ENCOUNTER
Patient lm at office stated she had surgery 3/11/21 (b/l ETD Balloon) would like to know why she cant hear now and she is experiencing severe dizziness and lightheadedness. She also asked if she should continue the ear drops past the 3 days since she still has some left. Can be called back at [phone].     Please advise

## 2021-03-18 ENCOUNTER — OFFICE VISIT (OUTPATIENT)
Dept: ENT CLINIC | Age: 61
End: 2021-03-18
Payer: COMMERCIAL

## 2021-03-18 VITALS
BODY MASS INDEX: 28.43 KG/M2 | HEART RATE: 60 BPM | WEIGHT: 187 LBS | DIASTOLIC BLOOD PRESSURE: 73 MMHG | SYSTOLIC BLOOD PRESSURE: 122 MMHG

## 2021-03-18 DIAGNOSIS — H69.83 DYSFUNCTION OF BOTH EUSTACHIAN TUBES: ICD-10-CM

## 2021-03-18 DIAGNOSIS — Z96.22 S/P MYRINGOTOMY WITH INSERTION OF TUBE: Primary | ICD-10-CM

## 2021-03-18 DIAGNOSIS — H65.93 BILATERAL SEROUS OTITIS MEDIA, UNSPECIFIED CHRONICITY: ICD-10-CM

## 2021-03-18 DIAGNOSIS — R09.82 POST-NASAL DRAINAGE: ICD-10-CM

## 2021-03-18 PROCEDURE — 92504 EAR MICROSCOPY EXAMINATION: CPT | Performed by: NURSE PRACTITIONER

## 2021-03-18 PROCEDURE — 99024 POSTOP FOLLOW-UP VISIT: CPT | Performed by: NURSE PRACTITIONER

## 2021-03-18 ASSESSMENT — ENCOUNTER SYMPTOMS
EYES NEGATIVE: 1
SHORTNESS OF BREATH: 0
RESPIRATORY NEGATIVE: 1
STRIDOR: 0

## 2021-03-18 NOTE — PROGRESS NOTES
[facility] Otolaryngology  Dr. Cory Morris. Jayne Donaldlana, [address] [facility] Follow Up        Patient Name:  Maicol Howard  :  1960     CHIEF C/O:    Chief Complaint   Patient presents with    Post-Op Check     1 wk p/o BMT        HISTORY OBTAINED FROM:  patient    HISTORY OF PRESENT ILLNESS:       Irene Irving is a 64y.o. year old female, here today for follow up of BMT and ETD. Her procedure was performed 1 week ago with bilateral myringotomy tubes placed. She states that she has been using her Ciprodex drops since the surgery and has had continued bloody drainage. She states that she has not had any significant improvement in her hearing since surgery. She states her last audio was done in January at [facility] who services her hearing aids. She states she recently went and had her hearing aids cleaned with still no improvement. She continues to have pressure in both ears. She continues to complain of persistent postnasal drainage and sinus congestion. Review of Systems   Constitutional: Negative. Negative for activity change and appetite change. HENT: Positive for congestion, ear discharge, ear pain, hearing loss and postnasal drip. Eyes: Negative. Respiratory: Negative. Negative for shortness of breath and stridor. Cardiovascular: Negative. Negative for chest pain and palpitations. Endocrine: Negative. Genitourinary: Negative. Musculoskeletal: Negative. Skin: Negative. Neurological: Negative. Negative for dizziness. Hematological: Negative. Psychiatric/Behavioral: Negative. /73   Pulse 60   Wt 187 lb (84.8 kg)   BMI 28.43 kg/m²   Physical Exam  Constitutional:       Appearance: Normal appearance. HENT:      Head: Normocephalic. Right Ear: Ear canal and external ear normal. Drainage present. A PE tube is present. Left Ear: Ear canal and external ear normal. Drainage present. A PE tube is present.       Ears:      Comments: Bilateral PE tubes blocked with mucoid drainage and blood clots. Nose: Rhinorrhea present. Rhinorrhea is clear. Mouth/Throat:      Lips: Pink. Mouth: Mucous membranes are moist.     Eyes:      Conjunctiva/sclera: Conjunctivae normal.      Pupils: Pupils are equal, round, and reactive to light. Neck:      Musculoskeletal: Normal range of motion. No neck rigidity or muscular tenderness. Cardiovascular:      Rate and Rhythm: Normal rate and regular rhythm. Pulses: Normal pulses. Pulmonary:      Effort: Pulmonary effort is normal. No respiratory distress. Breath sounds: No stridor. Musculoskeletal: Normal range of motion. Skin:     General: Skin is warm and dry. Neurological:      General: No focal deficit present. Mental Status: She is alert and oriented to person, place, and time. Psychiatric:         Mood and Affect: Mood normal.         Behavior: Behavior normal.         Thought Content: Thought content normal.         Judgment: Judgment normal.           IMPRESSION/PLAN:    Yesica Santos was seen today for post-op check. Diagnoses and all orders for this visit:    S/P myringotomy with insertion of tube  -     MS EAR MICROSCOPY EXAMINATION    Bilateral serous otitis media, unspecified chronicity    Dysfunction of both eustachian tubes    Post-nasal drainage      Yesica Santos is seen today for 1 week follow-up of myringotomy with bilateral tympanostomy tubes placement as well as eustachian tube balloon dilation. Upon exam bilateral PE tubes are blocked with mucoid drainage and blood clots. This was removed under microscopic assistance with gentle suction. Patient states once the left ear was suctioned she noticed a significant improvement in her hearing with her hearing aid in. She also noticed improvement in the right ear without her hearing aid. She continues to complain of postnasal drainage symptoms as well.   She will continue with her Flonase and Astelin sprays and is instructed to begin using nasal saline several times daily. She is instructed to keep the ears clean and dry using cotton while showering but otherwise leaving the canals open. She will follow-up in 3 weeks after she returns from [place]. She is instructed to call with any new or worsening of symptoms prior to her next appointment.     SUYAPA Mayers, FNP-C  8 [facility], Nose and Throat    The information contained in this note has been dictated using drug and medical speech recognition software and may contain errors

## 2021-04-08 ENCOUNTER — OFFICE VISIT (OUTPATIENT)
Dept: ENT CLINIC | Age: 61
End: 2021-04-08
Payer: COMMERCIAL

## 2021-04-08 ENCOUNTER — PROCEDURE VISIT (OUTPATIENT)
Dept: AUDIOLOGY | Age: 61
End: 2021-04-08
Payer: COMMERCIAL

## 2021-04-08 VITALS — HEIGHT: 68 IN | WEIGHT: 183.3 LBS | BODY MASS INDEX: 27.78 KG/M2

## 2021-04-08 DIAGNOSIS — H65.493 CHRONIC OTITIS MEDIA OF BOTH EARS WITH EFFUSION: ICD-10-CM

## 2021-04-08 DIAGNOSIS — H65.93 BILATERAL SEROUS OTITIS MEDIA, UNSPECIFIED CHRONICITY: ICD-10-CM

## 2021-04-08 DIAGNOSIS — H69.83 DYSFUNCTION OF BOTH EUSTACHIAN TUBES: Primary | ICD-10-CM

## 2021-04-08 DIAGNOSIS — Z96.22 S/P MYRINGOTOMY WITH INSERTION OF TUBE: ICD-10-CM

## 2021-04-08 PROCEDURE — 99213 OFFICE O/P EST LOW 20 MIN: CPT | Performed by: NURSE PRACTITIONER

## 2021-04-08 PROCEDURE — 92567 TYMPANOMETRY: CPT | Performed by: AUDIOLOGIST

## 2021-04-08 ASSESSMENT — ENCOUNTER SYMPTOMS
SHORTNESS OF BREATH: 0
RHINORRHEA: 0
STRIDOR: 0
RESPIRATORY NEGATIVE: 1
EYES NEGATIVE: 1

## 2021-04-08 NOTE — PROGRESS NOTES
This patient was referred for tympanometric testing by MITA Diaz due to COME, bilaterally. Tympanometry revealed flat tympanograms, bilaterally. Ipsilateral acoustic reflexes were absent, bilaterally at 1000Hz. The results were reviewed with the patient. Recommendations for follow up will be made pending physician consult.     Tisha Shukla CCC/SKY  Audiologist  [id number]  NPI#:  [id number]

## 2021-04-08 NOTE — PROGRESS NOTES
[facility] Otolaryngology  Dr. Merlin Bourgeois. Raj Gould. Ms.Ed        Patient Name:  Izabela Sullivan  :  1960     CHIEF C/O:    Chief Complaint   Patient presents with    Ear Problem     left ear drainage not letting her use both her hearing aids        HISTORY OBTAINED FROM:  patient    HISTORY OF PRESENT ILLNESS:       Fer Romero is a 63y.o. year old female, here today for follow up of bilateral ear drainage following myringotomy tubes. She states that since having her tubes placed she has noticed an improvement in her hearing but has significant amount of drainage from both ears. She states she is often unable to wear her left hearing aid due to the drainage and fear of it ruining her hearing aid. She does wear her right hearing aid which she states often is wet and crusted when she takes it out. She denies any pain. She states that at nighttime she sleeps with her head on a towel which is often soaked with drainage in the morning. She denies any foul odor or blood in the drainage. She is continue to take her Flonase and Astelin sprays for eustachian tube dysfunction and allergic rhinitis symptoms which continue to provide relief of her congestion, rhinorrhea, and postnasal drainage. Past Medical History:   Diagnosis Date    Cancer [facility]) dx 2013    melanoma left hip (had excision, chemo but no radiation)    COPD (chronic obstructive pulmonary disease) (HCC)     states cant afford her symbicort inhaler    Hx of blood clots     DVT rt leg while going thru chemo (was inactive)    Ulcerative colitis ([facility] [address]      Past Surgical History:   Procedure Laterality Date    ABDOMEN SURGERY      multiple d/t severe ulcerative colitis  rectum & lge intestine has been removed . only has 4 feet of small intestine left. ...has an internal BCIR([place] internal resevoir)was unable to tolerate external d/t severe ahesive allergy.  has to self catherize her bowel every couple of hrs    COLONOSCOPY      COSMETIC SURGERY  1983    upper jaw (has 4 wires holding her jaw up)    MYRINGOTOMY Bilateral 3/11/2021    BILATERAL MYRINGOTOMY  WITH TUBE, BILATERAL EUSTACHIAN TUBE BALLOON DILATION (CPT [id number], 55085) performed by Minerva Elliott DO at [facility] 8 2013    excision melanoma left hip    OTHER SURGICAL HISTORY Bilateral 03/11/2021    myringotomy with tube bilateral eustatian tube baloon dilation    SKIN BIOPSY         Current Outpatient Medications:     fluticasone (FLONASE) 50 MCG/ACT nasal spray, 2 sprays by Nasal route daily Use in both nostrils. , Disp: 1 Bottle, Rfl: 3    Cholecalciferol (VITAMIN D) 50 MCG (2000 UT) CAPS capsule, Take by mouth daily , Disp: , Rfl:     QUERCETIN PO, Take by mouth daily , Disp: , Rfl:     Ascorbic Acid (VITAMIN C) 250 MG tablet, Take 250 mg by mouth daily, Disp: , Rfl:     Biotin 1 MG CAPS, Take by mouth daily , Disp: , Rfl:     azelastine (ASTELIN) 0.1 % nasal spray, 2 sprays by Nasal route 2 times daily Use in each nostril as directed, Disp: 1 Bottle, Rfl: 1    Probiotic Product (CULTURELLE IMMUNE DEFENSE PO), Take by mouth, Disp: , Rfl:     Teena, Zingiber officinalis, (TEENA PO), Take by mouth daily With black pepper , Disp: , Rfl:     vitamin B-12 (CYANOCOBALAMIN) 100 MCG tablet, Take 50 mcg by mouth daily, Disp: , Rfl:   Adhesive tape, Augmentin [amoxicillin-pot clavulanate], Bactrim [sulfamethoxazole-trimethoprim], Cipro [ciprofloxacin hcl], Doxycycline, Sulfa antibiotics, Tetracyclines & related, Vancomycin, and Iv dye [iodides]  Social History     Tobacco Use    Smoking status: Current Every Day Smoker     Packs/day: 0.50     Years: 30.00     Pack years: 15.00     Types: Cigarettes    Smokeless tobacco: Never Used   Substance Use Topics    Alcohol use: Yes     Drinks per session: 3 or 4     Comment: wine 4 x per week    Drug use: Never     History reviewed. No pertinent family history.     Review of Systems Constitutional: Negative. Negative for activity change and appetite change. HENT: Positive for ear discharge and hearing loss. Negative for congestion, postnasal drip and rhinorrhea. Eyes: Negative. Respiratory: Negative. Negative for shortness of breath and stridor. Cardiovascular: Negative. Negative for chest pain and palpitations. Endocrine: Negative. Musculoskeletal: Negative. Skin: Negative. Neurological: Negative. Negative for dizziness. Hematological: Negative. Psychiatric/Behavioral: Negative. Ht 5' 8\" (1.727 m)   Wt 183 lb 4.8 oz (83.1 kg)   BMI 27.87 kg/m²   Physical Exam  Constitutional:       Appearance: Normal appearance. HENT:      Head: Normocephalic. Right Ear: Ear canal and external ear normal. Decreased hearing noted. Drainage present. A PE tube is present. Left Ear: Ear canal and external ear normal. Decreased hearing noted. Drainage present. A PE tube is present. Ears:      Comments: Bilateral PE tubes blocked with mucoid drainage      Nose: No rhinorrhea. Right Turbinates: Not swollen or pale. Left Turbinates: Not swollen or pale. Mouth/Throat:      Lips: Pink. Mouth: Mucous membranes are moist.   Eyes:      Conjunctiva/sclera: Conjunctivae normal.      Pupils: Pupils are equal, round, and reactive to light. Neck:      Musculoskeletal: Normal range of motion. No neck rigidity or muscular tenderness. Cardiovascular:      Rate and Rhythm: Normal rate and regular rhythm. Pulses: Normal pulses. Pulmonary:      Effort: Pulmonary effort is normal. No respiratory distress. Breath sounds: No stridor. Musculoskeletal: Normal range of motion. Skin:     General: Skin is warm and dry. Neurological:      General: No focal deficit present. Mental Status: She is alert and oriented to person, place, and time.    Psychiatric:         Mood and Affect: Mood normal.         Behavior: Behavior normal. Thought Content: Thought content normal.         Judgment: Judgment normal.         Tympanogram reviewed with patient. Flat curves bilaterally. PE tubes do appear to be within the TMs but blocked by mucoid drainage    IMPRESSION/PLAN:    Nancy Thapa was seen today for ear problem. Diagnoses and all orders for this visit:    S/P myringotomy with insertion of tube  -     LA EAR MICROSCOPY EXAMINATION    Bilateral serous otitis media, unspecified chronicity  -     LA EAR MICROSCOPY EXAMINATION    Dysfunction of both eustachian tubes      Nancy Thapa is seen today for 1 month follow-up of bilateral ear drainage following myringotomy. Upon exam myringotomy tubes are present and appear within the TM bilaterally. There is considerable mucoid drainage from both tubes with possible clogging of the tubes. There is no foul odor with only mild milky appearance to the drainage. At this time she will resume her Ciprodex drops, 4 drops to each ear twice daily for 7 days. She is instructed to call if she needs any refills. She will follow-up with Dr. Cedric feliciano in 2 weeks for reevaluation of tube placement and continued drainage. She is instructed to call with any new or worsening of symptoms prior to her next appointment.     Ondina Hawkins, MSN, FNP-C  8 [facility], Nose and Throat    The information contained in this note has been dictated using drug and medical speech recognition software and may contain errors

## 2021-04-23 ENCOUNTER — OFFICE VISIT (OUTPATIENT)
Dept: ENT CLINIC | Age: 61
End: 2021-04-23
Payer: COMMERCIAL

## 2021-04-23 ENCOUNTER — PROCEDURE VISIT (OUTPATIENT)
Dept: AUDIOLOGY | Age: 61
End: 2021-04-23
Payer: COMMERCIAL

## 2021-04-23 VITALS
HEART RATE: 65 BPM | SYSTOLIC BLOOD PRESSURE: 128 MMHG | HEIGHT: 68 IN | DIASTOLIC BLOOD PRESSURE: 72 MMHG | BODY MASS INDEX: 28.19 KG/M2 | WEIGHT: 186 LBS

## 2021-04-23 DIAGNOSIS — H65.493 COME (CHRONIC OTITIS MEDIA WITH EFFUSION), BILATERAL: Primary | ICD-10-CM

## 2021-04-23 DIAGNOSIS — H69.83 CHRONIC DYSFUNCTION OF BOTH EUSTACHIAN TUBES: ICD-10-CM

## 2021-04-23 DIAGNOSIS — H65.493 COME (CHRONIC OTITIS MEDIA WITH EFFUSION), BILATERAL: ICD-10-CM

## 2021-04-23 PROCEDURE — 99213 OFFICE O/P EST LOW 20 MIN: CPT | Performed by: OTOLARYNGOLOGY

## 2021-04-23 PROCEDURE — 92567 TYMPANOMETRY: CPT | Performed by: AUDIOLOGIST

## 2021-04-23 RX ORDER — AZELASTINE 1 MG/ML
2 SPRAY, METERED NASAL 2 TIMES DAILY
Qty: 1 BOTTLE | Refills: 1 | Status: SHIPPED
Start: 2021-04-23 | End: 2021-05-25

## 2021-04-23 NOTE — PROGRESS NOTES
This patient was referred for tympanometric testing by Dr. Alana Vera due to COME, bilaterally. Tympanometry revealed flat tympanograms, with no middle ear peak pressure, bilaterally. The results were reviewed with the patient. Recommendations for follow up will be made pending physician consult.     Adrianna Ng CCC/SKY  Audiologist  [id number]  NPI#:  [id number]

## 2021-05-05 ENCOUNTER — TELEPHONE (OUTPATIENT)
Dept: ENT CLINIC | Age: 61
End: 2021-05-05

## 2021-05-11 ENCOUNTER — PROCEDURE VISIT (OUTPATIENT)
Dept: AUDIOLOGY | Age: 61
End: 2021-05-11
Payer: COMMERCIAL

## 2021-05-11 ENCOUNTER — OFFICE VISIT (OUTPATIENT)
Dept: ENT CLINIC | Age: 61
End: 2021-05-11
Payer: COMMERCIAL

## 2021-05-11 DIAGNOSIS — H65.493 COME (CHRONIC OTITIS MEDIA WITH EFFUSION), BILATERAL: Primary | ICD-10-CM

## 2021-05-11 PROCEDURE — 92567 TYMPANOMETRY: CPT | Performed by: AUDIOLOGIST

## 2021-05-11 PROCEDURE — 99213 OFFICE O/P EST LOW 20 MIN: CPT | Performed by: OTOLARYNGOLOGY

## 2021-05-11 RX ORDER — METHYLPREDNISOLONE 4 MG/1
4 TABLET ORAL SEE ADMIN INSTRUCTIONS
Qty: 1 KIT | Refills: 0 | Status: SHIPPED | OUTPATIENT
Start: 2021-05-11 | End: 2021-05-17

## 2021-05-11 RX ORDER — CIPROFLOXACIN AND DEXAMETHASONE 3; 1 MG/ML; MG/ML
4 SUSPENSION/ DROPS AURICULAR (OTIC) 2 TIMES DAILY
Qty: 1 BOTTLE | Refills: 0 | Status: SHIPPED | OUTPATIENT
Start: 2021-05-11 | End: 2021-05-18

## 2021-05-11 RX ORDER — CLINDAMYCIN HYDROCHLORIDE 300 MG/1
300 CAPSULE ORAL 2 TIMES DAILY
Qty: 14 CAPSULE | Refills: 0 | Status: SHIPPED | OUTPATIENT
Start: 2021-05-11 | End: 2021-05-18

## 2021-05-11 RX ORDER — AZELASTINE 1 MG/ML
2 SPRAY, METERED NASAL 2 TIMES DAILY
Qty: 1 BOTTLE | Refills: 1 | Status: SHIPPED
Start: 2021-05-11 | End: 2021-05-25

## 2021-05-20 ASSESSMENT — ENCOUNTER SYMPTOMS
RHINORRHEA: 1
SINUS PAIN: 1
VOMITING: 0
SHORTNESS OF BREATH: 0
COUGH: 0

## 2021-05-20 NOTE — PROGRESS NOTES
for ear discharge and hearing loss. Respiratory: Negative for cough and shortness of breath. Cardiovascular: Negative for chest pain and palpitations. Gastrointestinal: Negative for vomiting. Skin: Negative for rash. Allergic/Immunologic: Negative for environmental allergies. Neurological: Negative for dizziness and headaches. Hematological: Does not bruise/bleed easily. All other systems reviewed and are negative. /72 (Site: Left Upper Arm, Position: Sitting, Cuff Size: Large Adult)   Pulse 65   Ht 5' 8\" (1.727 m)   Wt 186 lb (84.4 kg)   BMI 28.28 kg/m²   Physical Exam  Vitals reviewed. Constitutional:       Appearance: Normal appearance. HENT:      Head: Normocephalic and atraumatic. Right Ear: Tympanic membrane and ear canal normal.      Left Ear: Tympanic membrane normal.      Nose: Congestion and rhinorrhea present. Mouth/Throat:      Mouth: Mucous membranes are moist.      Pharynx: Oropharynx is clear. No posterior oropharyngeal erythema. Neurological:      Mental Status: She is alert. IMPRESSION/PLAN:  Patient seen and examined, history of chronic pansinus disease allergic rhinitis congestion, currently stabilized on Astelin daily, continue saline rinses twice daily as well as Astelin up to twice daily and follow-up with ENT in 4 to 6 months. Dr. Ankit Egan.  Otolaryngology Facial Plastic Surgery  :  [facility] Otolaryngology/Facial Plastic Surgery Residency  Associate Clinical Professor:  Dana Jaramillo, [facility]

## 2021-05-25 ENCOUNTER — OFFICE VISIT (OUTPATIENT)
Dept: ENT CLINIC | Age: 61
End: 2021-05-25
Payer: COMMERCIAL

## 2021-05-25 ENCOUNTER — PROCEDURE VISIT (OUTPATIENT)
Dept: AUDIOLOGY | Age: 61
End: 2021-05-25
Payer: COMMERCIAL

## 2021-05-25 VITALS
WEIGHT: 186 LBS | SYSTOLIC BLOOD PRESSURE: 120 MMHG | DIASTOLIC BLOOD PRESSURE: 76 MMHG | HEIGHT: 68 IN | BODY MASS INDEX: 28.19 KG/M2

## 2021-05-25 DIAGNOSIS — H65.493 COME (CHRONIC OTITIS MEDIA WITH EFFUSION), BILATERAL: Primary | ICD-10-CM

## 2021-05-25 PROCEDURE — 92567 TYMPANOMETRY: CPT | Performed by: AUDIOLOGIST

## 2021-05-25 PROCEDURE — 99213 OFFICE O/P EST LOW 20 MIN: CPT | Performed by: OTOLARYNGOLOGY

## 2021-05-25 NOTE — PROGRESS NOTES
This patient was referred for audiometric/tympanometric testing by Dr. Henrik Menjivar due to recurrent ear infections, bilaterally. Patient reported some improvement in drainage. Tympanometry revealed a flat tympanogram, in the right ear and irregular tympanogram pattern, in the left ear. The results were reviewed with the patient. Recommendations for follow up will be made pending physician consult.       Leo Navarro [facility]  [address] [facility] [id number]  Electronically signed by Leo Navarro on 5/25/2021 at 5:04 PM

## 2021-05-25 NOTE — PROGRESS NOTES
[address] [facility] Otolaryngology  Dr. Brisa Reed. Minal Steen. Ms.Ed        Patient Name:  Caro De La O  :  1960     CHIEF C/O:    Chief Complaint   Patient presents with    Ear Problem     10 day f/u for drainage patient states it was better and now it is draining again       HISTORY OBTAINED FROM:  patient    HISTORY OF PRESENT ILLNESS:       Kwasi Reyes is a 63y.o. year old female, here today for follow up of a for follow-up for history of left otitis externa that was moderate to severe, requiring multiple series of antibiotic therapies including oral and topical.  Patient states drainage is much better, no complaints of pain, no complaints of new ear pain. No complaints of hearing loss tinnitus or vertigo currently. No other right ear complaints. Patient does admit to frequently swimming in a home pond, is occasionally getting her head wet and going into the water. She denies any other associate complaints of fever chills sore throat hoarseness neck mass tumors or lesions. Past Medical History:   Diagnosis Date    Cancer [facility]) dx 2013    melanoma left hip (had excision, chemo but no radiation)    COPD (chronic obstructive pulmonary disease) ([facility])     states cant afford her symbicort inhaler    Hx of blood clots     DVT rt leg while going thru chemo (was inactive)    Ulcerative colitis ([address]      Past Surgical History:   Procedure Laterality Date    ABDOMEN SURGERY      multiple d/t severe ulcerative colitis  rectum & lge intestine has been removed . only has 4 feet of small intestine left. ...has an internal BCIR([place] internal resevoir)was unable to tolerate external d/t severe ahesive allergy.  has to self catherize her bowel every couple of hrs    COLONOSCOPY      COSMETIC SURGERY      upper jaw (has 4 wires holding her jaw up)    MYRINGOTOMY Bilateral 3/11/2021    BILATERAL MYRINGOTOMY  WITH TUBE, BILATERAL EUSTACHIAN TUBE BALLOON DILATION (CPT U6930213, 71987) performed by Isidra Veronica DO [facility] at [facility] OTHER SURGICAL HISTORY  2013    excision melanoma left hip    OTHER SURGICAL HISTORY Bilateral 03/11/2021    myringotomy with tube bilateral eustatian tube baloon dilation    SKIN BIOPSY         Current Outpatient Medications:     Probiotic Product (CULTURELLE IMMUNE DEFENSE PO), Take by mouth, Disp: , Rfl:     Cholecalciferol (VITAMIN D) 50 MCG (2000 UT) CAPS capsule, Take by mouth daily , Disp: , Rfl:     QUERCETIN PO, Take by mouth daily , Disp: , Rfl:     Teena, Zingiber officinalis, (TEEAN PO), Take by mouth daily With black pepper , Disp: , Rfl:     Ascorbic Acid (VITAMIN C) 250 MG tablet, Take 250 mg by mouth daily, Disp: , Rfl:     vitamin B-12 (CYANOCOBALAMIN) 100 MCG tablet, Take 50 mcg by mouth daily, Disp: , Rfl:     Biotin 1 MG CAPS, Take by mouth daily , Disp: , Rfl:     azelastine (ASTELIN) 0.1 % nasal spray, 2 sprays by Nasal route 2 times daily Use in each nostril as directed, Disp: 1 Bottle, Rfl: 1    azithromycin (ZITHROMAX) 250 MG tablet, Take 1 tablet by mouth See Admin Instructions for 5 days 500mg on day 1 followed by 250mg on days 2 - 5, Disp: 6 tablet, Rfl: 0    fluticasone (FLONASE) 50 MCG/ACT nasal spray, 2 sprays by Nasal route daily Use in both nostrils. , Disp: 1 Bottle, Rfl: 3  Adhesive tape, Augmentin [amoxicillin-pot clavulanate], Bactrim [sulfamethoxazole-trimethoprim], Cipro [ciprofloxacin hcl], Doxycycline, Sulfa antibiotics, Tetracyclines & related, Vancomycin, and Iv dye [iodides]  Social History     Tobacco Use    Smoking status: Current Every Day Smoker     Packs/day: 0.50     Years: 30.00     Pack years: 15.00     Types: Cigarettes    Smokeless tobacco: Never Used   Vaping Use    Vaping Use: Never used   Substance Use Topics    Alcohol use: Yes     Comment: wine 4 x per week    Drug use: Never     History reviewed. No pertinent family history. Review of Systems   Constitutional: Negative for chills and fever.    HENT: Positive for congestion. Negative for ear discharge, hearing loss, mouth sores, postnasal drip, sinus pain, sore throat and tinnitus. Respiratory: Negative for cough and shortness of breath. Cardiovascular: Negative for chest pain and palpitations. Gastrointestinal: Negative for vomiting. Skin: Negative for rash. Allergic/Immunologic: Negative for environmental allergies. Neurological: Negative for dizziness and headaches. Hematological: Does not bruise/bleed easily. All other systems reviewed and are negative. /76   Ht 5' 8\" (1.727 m)   Wt 186 lb (84.4 kg)   BMI 28.28 kg/m²   Physical Exam  Vitals and nursing note reviewed. Constitutional:       Appearance: She is well-developed. HENT:      Right Ear: Tympanic membrane, ear canal and external ear normal. There is no impacted cerumen. Left Ear: Tympanic membrane and ear canal normal. There is no impacted cerumen. Nose: Congestion and rhinorrhea present. Mouth/Throat:      Mouth: Mucous membranes are moist.      Pharynx: Oropharynx is clear. No oropharyngeal exudate. Eyes:      Pupils: Pupils are equal, round, and reactive to light. Neck:      Thyroid: No thyromegaly. Trachea: No tracheal deviation. Cardiovascular:      Rate and Rhythm: Normal rate. Pulmonary:      Effort: Pulmonary effort is normal. No respiratory distress. Musculoskeletal:         General: Normal range of motion. Cervical back: Normal range of motion. Lymphadenopathy:      Cervical: No cervical adenopathy. Skin:     General: Skin is warm. Findings: No erythema. Neurological:      Mental Status: She is alert. Cranial Nerves: No cranial nerve deficit.          IMPRESSION/PLAN:  Seen and examined with mild to severe otitis externa secondary to water exposure recommended avoiding any water exposure particularly in a pond or nonchlorinated scenario, and certainly if she is exposed she should be wearing earplugs at all times with ear drying mechanisms afterwards. Patient will continue current medical therapies, refills provided follow-up in 6 months or sooner with any increasing complaints. Dr. Da Nguyen.  Otolaryngology Facial Plastic Surgery  :  [facility] Otolaryngology/Facial Plastic Surgery Residency  Associate Clinical Professor:  [facility]

## 2021-06-01 ASSESSMENT — ENCOUNTER SYMPTOMS
SHORTNESS OF BREATH: 0
SINUS PRESSURE: 0
SINUS PAIN: 0
RHINORRHEA: 0
COUGH: 0
VOMITING: 0
FACIAL SWELLING: 0

## 2021-06-22 ENCOUNTER — TELEPHONE (OUTPATIENT)
Dept: ENT CLINIC | Age: 61
End: 2021-06-22

## 2021-06-24 ENCOUNTER — OFFICE VISIT (OUTPATIENT)
Dept: ENT CLINIC | Age: 61
End: 2021-06-24
Payer: COMMERCIAL

## 2021-06-24 VITALS
DIASTOLIC BLOOD PRESSURE: 70 MMHG | HEART RATE: 64 BPM | HEIGHT: 68 IN | BODY MASS INDEX: 27.89 KG/M2 | WEIGHT: 184 LBS | SYSTOLIC BLOOD PRESSURE: 108 MMHG

## 2021-06-24 DIAGNOSIS — H65.493 COME (CHRONIC OTITIS MEDIA WITH EFFUSION), BILATERAL: ICD-10-CM

## 2021-06-24 DIAGNOSIS — H65.493 COME (CHRONIC OTITIS MEDIA WITH EFFUSION), BILATERAL: Primary | ICD-10-CM

## 2021-06-24 DIAGNOSIS — Z96.22 S/P BILATERAL MYRINGOTOMY WITH TUBE PLACEMENT: ICD-10-CM

## 2021-06-24 DIAGNOSIS — Z45.89 TYMPANOSTOMY TUBE CHECK: ICD-10-CM

## 2021-06-24 DIAGNOSIS — H66.13 CHRONIC TUBOTYMPANIC SUPPURATIVE OTITIS MEDIA OF BOTH EARS: ICD-10-CM

## 2021-06-24 PROCEDURE — 99203 OFFICE O/P NEW LOW 30 MIN: CPT | Performed by: NURSE PRACTITIONER

## 2021-06-24 RX ORDER — AZITHROMYCIN 250 MG/1
250 TABLET, FILM COATED ORAL SEE ADMIN INSTRUCTIONS
Qty: 6 TABLET | Refills: 0 | Status: SHIPPED | OUTPATIENT
Start: 2021-06-24 | End: 2021-06-29

## 2021-06-24 RX ORDER — FLUTICASONE PROPIONATE 50 MCG
2 SPRAY, SUSPENSION (ML) NASAL DAILY
Qty: 1 BOTTLE | Refills: 3 | Status: SHIPPED
Start: 2021-06-24 | End: 2022-10-13 | Stop reason: SDUPTHER

## 2021-06-24 NOTE — PROGRESS NOTES
[facility] Otolaryngology  Dr. Dow Rubinstein. Laura Alicia. Ms.Ed        Patient Name:  Yvonne Lennon  :  1960     CHIEF C/O:    Chief Complaint   Patient presents with    Other     recurrent ear drainage       HISTORY OBTAINED FROM:  patient    HISTORY OF PRESENT ILLNESS:       Caron Lucas is a 63y.o. year old female, here today for follow up of ear pain and drainage. Has PE tubes bilaterally place by Dr. Art Qureshi on 3/11/21  Has had continued drainage, worse from right ear. Was seen in May by Dr. Art Qureshi for drainage and pain after swimming in pond  Symptoms improved temporarily. Right ear now painful, unable to use hearing  Mild off balance sensation  No recent fever  Has been using drops intermittently. Past Medical History:   Diagnosis Date    Cancer [facility]) dx     melanoma left hip (had excision, chemo but no radiation)    COPD (chronic obstructive pulmonary disease) ([facility])     states cant afford her symbicort inhaler    Hx of blood clots     DVT rt leg while going thru chemo (was inactive)    Ulcerative [address] ([facility] [address]      Past Surgical History:   Procedure Laterality Date    ABDOMEN SURGERY      multiple d/t severe ulcerative colitis  rectum & lge intestine has been removed . only has 4 feet of small intestine left. ...has an internal BCIR([place] internal resevoir)was unable to tolerate external d/t severe ahesive allergy.  has to self catherize her bowel every couple of hrs    COLONOSCOPY      COSMETIC SURGERY      upper jaw (has 4 wires holding her jaw up)    MYRINGOTOMY Bilateral 3/11/2021    BILATERAL MYRINGOTOMY  WITH TUBE, BILATERAL EUSTACHIAN TUBE BALLOON DILATION (CPT [id number], 12102) performed by Kim Valenzuela DO at [facility] 2013    excision melanoma left hip    OTHER SURGICAL HISTORY Bilateral 2021    myringotomy with tube bilateral eustatian tube baloon dilation    SKIN BIOPSY         Current Outpatient Medications:    Probiotic Product (CULTURELLE IMMUNE DEFENSE PO), Take by mouth, Disp: , Rfl:     fluticasone (FLONASE) 50 MCG/ACT nasal spray, 2 sprays by Nasal route daily Use in both nostrils. , Disp: 1 Bottle, Rfl: 3    Cholecalciferol (VITAMIN D) 50 MCG (2000 UT) CAPS capsule, Take by mouth daily , Disp: , Rfl:     QUERCETIN PO, Take by mouth daily , Disp: , Rfl:     Teena, Zingiber officinalis, (TEENA PO), Take by mouth daily With black pepper , Disp: , Rfl:     Ascorbic Acid (VITAMIN C) 250 MG tablet, Take 250 mg by mouth daily, Disp: , Rfl:     vitamin B-12 (CYANOCOBALAMIN) 100 MCG tablet, Take 50 mcg by mouth daily, Disp: , Rfl:     Biotin 1 MG CAPS, Take by mouth daily , Disp: , Rfl:     azelastine (ASTELIN) 0.1 % nasal spray, 2 sprays by Nasal route 2 times daily Use in each nostril as directed, Disp: 1 Bottle, Rfl: 1  Adhesive tape, Augmentin [amoxicillin-pot clavulanate], Bactrim [sulfamethoxazole-trimethoprim], Cipro [ciprofloxacin hcl], Doxycycline, Sulfa antibiotics, Tetracyclines & related, Vancomycin, and Iv dye [iodides]  Social History     Tobacco Use    Smoking status: Current Every Day Smoker     Packs/day: 0.50     Years: 30.00     Pack years: 15.00     Types: Cigarettes    Smokeless tobacco: Never Used   Vaping Use    Vaping Use: Never used   Substance Use Topics    Alcohol use: Yes     Comment: wine 4 x per week    Drug use: Never     History reviewed. No pertinent family history. Review of Systems   Constitutional: Negative. Negative for activity change and appetite change. HENT: Positive for ear discharge, ear pain and hearing loss. Negative for tinnitus. Eyes: Negative. Respiratory: Negative. Negative for shortness of breath and stridor. Cardiovascular: Negative. Negative for chest pain and palpitations. Endocrine: Negative. Musculoskeletal: Negative. Skin: Negative. Neurological: Negative. Negative for dizziness. Hematological: Negative. Psychiatric/Behavioral: Negative. /70 (Site: Left Upper Arm, Position: Sitting, Cuff Size: Medium Adult)   Pulse 64   Ht 5' 8\" (1.727 m)   Wt 184 lb (83.5 kg)   BMI 27.98 kg/m²   Physical Exam  Constitutional:       Appearance: Normal appearance. HENT:      Head: Normocephalic. Right Ear: Ear canal and external ear normal. Decreased hearing noted. Drainage present. A PE tube is present. Left Ear: Ear canal and external ear normal. Decreased hearing noted. Drainage present. A PE tube is present. Ears:      Comments: Bilateral PE tubes blocked with purulent drainage      Nose: No rhinorrhea. Right Turbinates: Not swollen or pale. Left Turbinates: Not swollen or pale. Mouth/Throat:      Lips: Pink. Mouth: Mucous membranes are moist.   Eyes:      Conjunctiva/sclera: Conjunctivae normal.      Pupils: Pupils are equal, round, and reactive to light. Cardiovascular:      Rate and Rhythm: Normal rate and regular rhythm. Pulses: Normal pulses. Pulmonary:      Effort: Pulmonary effort is normal. No respiratory distress. Breath sounds: No stridor. Musculoskeletal:         General: Normal range of motion. Cervical back: Normal range of motion. No rigidity. No muscular tenderness. Skin:     General: Skin is warm and dry. Neurological:      General: No focal deficit present. Mental Status: She is alert and oriented to person, place, and time. Psychiatric:         Mood and Affect: Mood normal.         Behavior: Behavior normal.         Thought Content: Thought content normal.         Judgment: Judgment normal.         IMPRESSION/PLAN:  Nicolasa Enriquez was seen today for other. Diagnoses and all orders for this visit:    COME (chronic otitis media with effusion), bilateral  -     Culture, Ear/Eye; Future  -     azithromycin (ZITHROMAX) 250 MG tablet;  Take 1 tablet by mouth See Admin Instructions for 5 days 500mg on day 1 followed by 250mg on days 2 - 5    S/p bilateral myringotomy with tube placement    Tympanostomy tube check    Chronic tubotympanic suppurative otitis media of both ears    Other orders  -     fluticasone (FLONASE) 50 MCG/ACT nasal spray; 2 sprays by Nasal route daily Use in both nostrils. Patient is found to have significant purulent drainage coming from bilateral PE tubes. This was suctioned with a 3 [place] suction catheter offering some improvement in the patient's hearing at that time. A culture is obtained of the right ear. Patient is instructed to begin using her Cipro drops, 4 drops to each ear twice daily for 7 days will be placed on a Z-Benjamín due to her multiple medication allergies. She will follow-up in 2 weeks for reevaluation. She instructed to call with any new or worsening of symptoms prior to her next appointment.         Zina Kuo, SUYAPA, FNP-C  [facility], Nose and Throat    The information contained in this note has been dictated using drug and medical speech recognition software and may contain errors

## 2021-06-26 LAB
CULTURE EAR OR EYE: ABNORMAL
GRAM STAIN RESULT: ABNORMAL
ORGANISM: ABNORMAL

## 2021-06-28 DIAGNOSIS — H65.493 COME (CHRONIC OTITIS MEDIA WITH EFFUSION), BILATERAL: ICD-10-CM

## 2021-06-28 DIAGNOSIS — A49.02 MRSA (METHICILLIN RESISTANT STAPHYLOCOCCUS AUREUS) INFECTION: Primary | ICD-10-CM

## 2021-06-28 ASSESSMENT — ENCOUNTER SYMPTOMS
COUGH: 0
SHORTNESS OF BREATH: 0
VOMITING: 0
SINUS PAIN: 0
SORE THROAT: 0

## 2021-06-28 NOTE — PROGRESS NOTES
Culture of right ear returns for MRSA. Patient exhibits allergies to all recommended therapies per sensitivity report. Patient will be referred to infectious disease for further treatment of the infection.

## 2021-07-09 ENCOUNTER — TELEPHONE (OUTPATIENT)
Dept: ENT CLINIC | Age: 61
End: 2021-07-09

## 2021-07-09 DIAGNOSIS — A49.02 MRSA (METHICILLIN RESISTANT STAPHYLOCOCCUS AUREUS) INFECTION: Primary | ICD-10-CM

## 2021-07-09 DIAGNOSIS — H65.493 COME (CHRONIC OTITIS MEDIA WITH EFFUSION), BILATERAL: ICD-10-CM

## 2021-07-09 NOTE — TELEPHONE ENCOUNTER
Pt called into office stating General Jimmy gave a temporary 5 day medication to help clear ear up when she was seen last; it had helped but now its plugged up again, she's having a lot of trouble hearing and had a lot of drainage come out this morning. She doesn't see infectious disease until 7/16/21. Can be called back at [phone] preferred pharmacy is [facility] in [place]. Please advise.

## 2021-07-09 NOTE — TELEPHONE ENCOUNTER
Spoke with Alex Zuleta-- advised to call in a zpak 500mg day 1 250mg days 2-5    Called pharmacy spoke with pharmacist jerald and called in rx please sign pended order

## 2021-07-12 RX ORDER — AZITHROMYCIN 250 MG/1
250 TABLET, FILM COATED ORAL SEE ADMIN INSTRUCTIONS
Qty: 6 TABLET | Refills: 0 | OUTPATIENT
Start: 2021-07-12 | End: 2021-07-17

## 2021-07-14 ASSESSMENT — ENCOUNTER SYMPTOMS
EYES NEGATIVE: 1
STRIDOR: 0
RESPIRATORY NEGATIVE: 1
SHORTNESS OF BREATH: 0

## 2021-07-23 DIAGNOSIS — A49.02 MRSA (METHICILLIN RESISTANT STAPHYLOCOCCUS AUREUS) INFECTION: ICD-10-CM

## 2021-07-23 LAB
ALBUMIN SERPL-MCNC: 4.4 G/DL (ref 3.5–5.2)
ALP BLD-CCNC: 102 U/L (ref 35–104)
ALT SERPL-CCNC: 23 U/L (ref 0–32)
ANION GAP SERPL CALCULATED.3IONS-SCNC: 18 MMOL/L (ref 7–16)
AST SERPL-CCNC: 28 U/L (ref 0–31)
BASOPHILS ABSOLUTE: 0.06 E9/L (ref 0–0.2)
BASOPHILS RELATIVE PERCENT: 0.5 % (ref 0–2)
BILIRUB SERPL-MCNC: 0.6 MG/DL (ref 0–1.2)
BUN BLDV-MCNC: 13 MG/DL (ref 6–20)
CALCIUM SERPL-MCNC: 9.8 MG/DL (ref 8.6–10.2)
CHLORIDE BLD-SCNC: 103 MMOL/L (ref 98–107)
CO2: 22 MMOL/L (ref 22–29)
CREAT SERPL-MCNC: 0.7 MG/DL (ref 0.5–1)
EOSINOPHILS ABSOLUTE: 0.11 E9/L (ref 0.05–0.5)
EOSINOPHILS RELATIVE PERCENT: 0.9 % (ref 0–6)
GFR AFRICAN AMERICAN: >60
GFR NON-AFRICAN AMERICAN: >60 ML/MIN/1.73
GLUCOSE BLD-MCNC: 107 MG/DL (ref 74–99)
HCT VFR BLD CALC: 45.1 % (ref 34–48)
HEMOGLOBIN: 14.7 G/DL (ref 11.5–15.5)
IMMATURE GRANULOCYTES #: 0.03 E9/L
IMMATURE GRANULOCYTES %: 0.3 % (ref 0–5)
LYMPHOCYTES ABSOLUTE: 1.88 E9/L (ref 1.5–4)
LYMPHOCYTES RELATIVE PERCENT: 16.2 % (ref 20–42)
MCH RBC QN AUTO: 31.7 PG (ref 26–35)
MCHC RBC AUTO-ENTMCNC: 32.6 % (ref 32–34.5)
MCV RBC AUTO: 97.4 FL (ref 80–99.9)
MONOCYTES ABSOLUTE: 1.04 E9/L (ref 0.1–0.95)
MONOCYTES RELATIVE PERCENT: 9 % (ref 2–12)
NEUTROPHILS ABSOLUTE: 8.46 E9/L (ref 1.8–7.3)
NEUTROPHILS RELATIVE PERCENT: 73.1 % (ref 43–80)
PDW BLD-RTO: 14.3 FL (ref 11.5–15)
PLATELET # BLD: 248 E9/L (ref 130–450)
PMV BLD AUTO: 9.7 FL (ref 7–12)
POTASSIUM SERPL-SCNC: 4.5 MMOL/L (ref 3.5–5)
RBC # BLD: 4.63 E12/L (ref 3.5–5.5)
SODIUM BLD-SCNC: 143 MMOL/L (ref 132–146)
TOTAL PROTEIN: 7.5 G/DL (ref 6.4–8.3)
WBC # BLD: 11.6 E9/L (ref 4.5–11.5)

## 2021-07-24 LAB — SEDIMENTATION RATE, ERYTHROCYTE: 39 MM/HR (ref 0–20)

## 2021-07-26 ENCOUNTER — APPOINTMENT (OUTPATIENT)
Dept: CT IMAGING | Age: 61
End: 2021-07-26
Payer: COMMERCIAL

## 2021-07-26 ENCOUNTER — HOSPITAL ENCOUNTER (EMERGENCY)
Age: 61
Discharge: HOME | End: 2021-07-26
Payer: COMMERCIAL

## 2021-07-26 VITALS
SYSTOLIC BLOOD PRESSURE: 145 MMHG | DIASTOLIC BLOOD PRESSURE: 75 MMHG | HEIGHT: 68 IN | TEMPERATURE: 98.2 F | WEIGHT: 180 LBS | BODY MASS INDEX: 27.28 KG/M2 | RESPIRATION RATE: 18 BRPM | HEART RATE: 97 BPM | OXYGEN SATURATION: 95 %

## 2021-07-26 DIAGNOSIS — R68.84 JAW PAIN: Primary | ICD-10-CM

## 2021-07-26 DIAGNOSIS — H69.82 DYSFUNCTION OF LEFT EUSTACHIAN TUBE: ICD-10-CM

## 2021-07-26 DIAGNOSIS — H65.22 LEFT CHRONIC SEROUS OTITIS MEDIA: ICD-10-CM

## 2021-07-26 LAB
BASOPHILS ABSOLUTE: 0.05 E9/L (ref 0–0.2)
BASOPHILS RELATIVE PERCENT: 0.5 % (ref 0–2)
EOSINOPHILS ABSOLUTE: 0.09 E9/L (ref 0.05–0.5)
EOSINOPHILS RELATIVE PERCENT: 0.9 % (ref 0–6)
HCT VFR BLD CALC: 42.2 % (ref 34–48)
HEMOGLOBIN: 14.6 G/DL (ref 11.5–15.5)
IMMATURE GRANULOCYTES #: 0.03 E9/L
IMMATURE GRANULOCYTES %: 0.3 % (ref 0–5)
LYMPHOCYTES ABSOLUTE: 1.8 E9/L (ref 1.5–4)
LYMPHOCYTES RELATIVE PERCENT: 18.3 % (ref 20–42)
MCH RBC QN AUTO: 33.1 PG (ref 26–35)
MCHC RBC AUTO-ENTMCNC: 34.6 % (ref 32–34.5)
MCV RBC AUTO: 95.7 FL (ref 80–99.9)
MONOCYTES ABSOLUTE: 0.96 E9/L (ref 0.1–0.95)
MONOCYTES RELATIVE PERCENT: 9.7 % (ref 2–12)
NEUTROPHILS ABSOLUTE: 6.93 E9/L (ref 1.8–7.3)
NEUTROPHILS RELATIVE PERCENT: 70.3 % (ref 43–80)
PDW BLD-RTO: 13.7 FL (ref 11.5–15)
PLATELET # BLD: 240 E9/L (ref 130–450)
PMV BLD AUTO: 9 FL (ref 7–12)
RBC # BLD: 4.41 E12/L (ref 3.5–5.5)
WBC # BLD: 9.9 E9/L (ref 4.5–11.5)

## 2021-07-26 PROCEDURE — 6360000004 HC RX CONTRAST MEDICATION: Performed by: RADIOLOGY

## 2021-07-26 PROCEDURE — 6360000002 HC RX W HCPCS: Performed by: PHYSICIAN ASSISTANT

## 2021-07-26 PROCEDURE — 96375 TX/PRO/DX INJ NEW DRUG ADDON: CPT

## 2021-07-26 PROCEDURE — 70487 CT MAXILLOFACIAL W/DYE: CPT

## 2021-07-26 PROCEDURE — 99283 EMERGENCY DEPT VISIT LOW MDM: CPT

## 2021-07-26 PROCEDURE — 96374 THER/PROPH/DIAG INJ IV PUSH: CPT

## 2021-07-26 PROCEDURE — 2580000003 HC RX 258: Performed by: PHYSICIAN ASSISTANT

## 2021-07-26 PROCEDURE — 6370000000 HC RX 637 (ALT 250 FOR IP): Performed by: PHYSICIAN ASSISTANT

## 2021-07-26 PROCEDURE — 85025 COMPLETE CBC W/AUTO DIFF WBC: CPT

## 2021-07-26 RX ORDER — METHYLPREDNISOLONE SODIUM SUCCINATE 125 MG/2ML
125 INJECTION, POWDER, LYOPHILIZED, FOR SOLUTION INTRAMUSCULAR; INTRAVENOUS ONCE
Status: COMPLETED | OUTPATIENT
Start: 2021-07-26 | End: 2021-07-26

## 2021-07-26 RX ORDER — HYDROCODONE BITARTRATE AND ACETAMINOPHEN 5; 325 MG/1; MG/1
1 TABLET ORAL ONCE
Status: COMPLETED | OUTPATIENT
Start: 2021-07-26 | End: 2021-07-26

## 2021-07-26 RX ORDER — DIPHENHYDRAMINE HYDROCHLORIDE 50 MG/ML
25 INJECTION INTRAMUSCULAR; INTRAVENOUS ONCE
Status: COMPLETED | OUTPATIENT
Start: 2021-07-26 | End: 2021-07-26

## 2021-07-26 RX ORDER — 0.9 % SODIUM CHLORIDE 0.9 %
1000 INTRAVENOUS SOLUTION INTRAVENOUS ONCE
Status: COMPLETED | OUTPATIENT
Start: 2021-07-26 | End: 2021-07-26

## 2021-07-26 RX ORDER — HYDROCODONE BITARTRATE AND ACETAMINOPHEN 5; 325 MG/1; MG/1
1 TABLET ORAL EVERY 8 HOURS PRN
Qty: 9 TABLET | Refills: 0 | Status: SHIPPED | OUTPATIENT
Start: 2021-07-26 | End: 2021-07-29

## 2021-07-26 RX ADMIN — DIPHENHYDRAMINE HYDROCHLORIDE 25 MG: 50 INJECTION INTRAMUSCULAR; INTRAVENOUS at 19:22

## 2021-07-26 RX ADMIN — HYDROCODONE BITARTRATE AND ACETAMINOPHEN 1 TABLET: 5; 325 TABLET ORAL at 21:56

## 2021-07-26 RX ADMIN — SODIUM CHLORIDE 1000 ML: 9 INJECTION, SOLUTION INTRAVENOUS at 19:18

## 2021-07-26 RX ADMIN — METHYLPREDNISOLONE SODIUM SUCCINATE 125 MG: 125 INJECTION, POWDER, FOR SOLUTION INTRAMUSCULAR; INTRAVENOUS at 19:20

## 2021-07-26 RX ADMIN — IOPAMIDOL 75 ML: 755 INJECTION, SOLUTION INTRAVENOUS at 20:17

## 2021-07-26 ASSESSMENT — ENCOUNTER SYMPTOMS
SINUS PRESSURE: 0
SORE THROAT: 0
COUGH: 0
SINUS PAIN: 0
SHORTNESS OF BREATH: 0
EYE REDNESS: 0
TROUBLE SWALLOWING: 0
EYE PAIN: 0
COLOR CHANGE: 0
EYE DISCHARGE: 0
CHEST TIGHTNESS: 0

## 2021-07-26 ASSESSMENT — PAIN DESCRIPTION - ORIENTATION: ORIENTATION: LEFT

## 2021-07-26 ASSESSMENT — PAIN DESCRIPTION - LOCATION: LOCATION: EAR;JAW;FACE

## 2021-07-26 ASSESSMENT — PAIN SCALES - GENERAL: PAINLEVEL_OUTOF10: 3

## 2021-07-26 ASSESSMENT — PAIN DESCRIPTION - PAIN TYPE: TYPE: ACUTE PAIN

## 2021-07-26 ASSESSMENT — PAIN DESCRIPTION - FREQUENCY: FREQUENCY: CONTINUOUS

## 2021-07-26 NOTE — ED PROVIDER NOTES
[facility]        Department of Emergency Medicine   ED  Provider Note  Admit Date/RoomTime: 7/26/2021  6:35 PM  ED Room: 26/26  HPI:  7/26/21, Time: 7:33 PM EDT      The patient is a 61-year-old female with a history of COPD, ulcerative colitis, chronic otitis media of the left ear,hearing loss,  eustachian tube dysfunction and B/L myringotomy presenting to the ED with severe left ear pain radiating into her left jaw. She also has some swelling to the left mandible and pain when trying to open her mouth and chew. The patient states she has had a chronic infection in her left ear and had tubes placed in the ear. She has chronic yellow drainage from the ear. She has been seeing infectious disease and ENT  and was told that she has a MRSA infection in the left ear. Patient was just recently started on Linezolid last week for the infection due to having multiple other antibiotic allergies. She states she took it for about 4 days but then felt like the pain was getting worse and the swelling began so she stopped taking it as she was concerned that she may be having allergic reaction to it. Patient states that the pain and swelling has not gotten any better despite stopping the antibiotic. She saw her dentist today and was told that it is not related to her teeth and that she should come to the ED. She has not had any fevers or chills, bleeding from the ear, new or worsening drainage from the ear, redness or warmth of the face, difficulty swallowing or breathing. The history is provided by the patient. No  was used. REVIEW OF SYSTEMS:  Review of Systems   Constitutional: Negative for activity change, chills, fatigue and fever. HENT: Positive for ear pain and facial swelling. Negative for congestion, dental problem, mouth sores, sinus pressure, sinus pain, sore throat and trouble swallowing. Jaw pain. Eyes: Negative for pain, discharge and redness.    Respiratory: Negative for cough, chest tightness and shortness of breath. Cardiovascular: Negative for chest pain, palpitations and leg swelling. Musculoskeletal: Negative for myalgias, neck pain and neck stiffness. Skin: Negative for color change, pallor and rash. Neurological: Negative for dizziness, weakness, light-headedness and headaches. Psychiatric/Behavioral: Negative for agitation, behavioral problems and confusion. Pertinent positives and negatives are stated within HPI, all other systems reviewed and are negative.      --------------------------------------------- PAST HISTORY ---------------------------------------------  Past Medical History:  has a past medical history of Cancer ([facility] [address] COPD [facility]chronic obstructive [facility] disease[facility] Hx of blood clots, and Ulcerative colitis ([facility] Past Surgical History:  has a past surgical history that includes other surgical history (2013); Cosmetic surgery (1983); Colonoscopy; Abdomen surgery; skin biopsy; other surgical history (Bilateral, 03/11/2021); and myringotomy (Bilateral, 3/11/2021). Social History:  reports that she has been smoking cigarettes. She has a 15.00 pack-year smoking history. She has never used smokeless tobacco. She reports current alcohol use. She reports that she does not use drugs. Family History: family history is not on file. The patients home medications have been reviewed.     Allergies: Adhesive tape, Augmentin [amoxicillin-pot clavulanate], Bactrim [sulfamethoxazole-trimethoprim], Cipro [ciprofloxacin hcl], Doxycycline, Sulfa antibiotics, Tetracyclines & related, Vancomycin, and Iv dye [iodides]    -------------------------------------------------- RESULTS -------------------------------------------------  All laboratory and radiology results have been personally reviewed by myself   LABS:  Results for orders placed or performed during the hospital encounter of 07/26/21   CBC auto differential   Result Value Ref Range    WBC 9.9 4.5 - 11.5 E9/L    RBC 4.41 3.50 - 5.50 E12/L    Hemoglobin 14.6 11.5 - 15.5 g/dL    Hematocrit 42.2 34.0 - 48.0 %    MCV 95.7 80.0 - 99.9 fL    MCH 33.1 26.0 - 35.0 pg    MCHC 34.6 (H) 32.0 - 34.5 %    RDW 13.7 11.5 - 15.0 fL    Platelets 674 960 - 399 E9/L    MPV 9.0 7.0 - 12.0 fL    Neutrophils % 70.3 43.0 - 80.0 %    Immature Granulocytes % 0.3 0.0 - 5.0 %    Lymphocytes % 18.3 (L) 20.0 - 42.0 %    Monocytes % 9.7 2.0 - 12.0 %    Eosinophils % 0.9 0.0 - 6.0 %    Basophils % 0.5 0.0 - 2.0 %    Neutrophils Absolute 6.93 1.80 - 7.30 E9/L    Immature Granulocytes # 0.03 E9/L    Lymphocytes Absolute 1.80 1.50 - 4.00 E9/L    Monocytes Absolute 0.96 (H) 0.10 - 0.95 E9/L    Eosinophils Absolute 0.09 0.05 - 0.50 E9/L    Basophils Absolute 0.05 0.00 - 0.20 E9/L       RADIOLOGY:  Interpreted by Radiologist.  CT FACIAL BONES W CONTRAST   Final Result   1. Partial opacification of the middle ear cavities bilaterally. Complete   opacification of the left mastoid air cells with near complete opacification   of the right maxillary air cells. These findings may represent manifestation   of otomastoiditis or eustachian tube dysfunction. No bony destructive lesion   is seen. No abscess is identified. 2. 2.4 x 2.8 x 1.7 cm expansile cystic lesion in the right posterior maxilla,   resulting in erosion of the floor of the right maxillary sinus. The   morphology of the cyst is nonaggressive. It may represent a periapical cyst   or and a odontogenic keratocyst.   3. 7 x 5 mm enhancing mass along the roof of the left orbit, likely   representing a meningioma. ------------------------- NURSING NOTES AND VITALS REVIEWED ---------------------------   The nursing notes within the ED encounter and vital signs as below have been reviewed.    BP (!) 145/75   Pulse 97   Temp 98.2 °F (36.8 °C) (Oral)   Resp 18   Ht 5' 8\" (1.727 m)   Wt 180 lb (81.6 kg)   SpO2 95%   BMI 27.37 kg/m²   Oxygen Saturation Interpretation: Normal      ---------------------------------------------------PHYSICAL EXAM--------------------------------------    Physical Exam  Vitals and nursing note reviewed. Constitutional:       General: She is not in acute distress. Appearance: Normal appearance. She is well-developed. She is not ill-appearing. HENT:      Head: Normocephalic and atraumatic. Ears:      Comments: Left external canal is unremarkable. Tubing noted in left ear with yellow drainage surrounding it. Mouth/Throat:      Mouth: Mucous membranes are moist.      Pharynx: Oropharynx is clear. Comments: Tenderness over the left TMJ with mild overlying edema and slightly restricted ROM. No erythema or warmth to the mandible. No mastoid tenderness. No intraoral swelling. Eyes:      Extraocular Movements: Extraocular movements intact. Conjunctiva/sclera: Conjunctivae normal.      Pupils: Pupils are equal, round, and reactive to light. Neck:      Vascular: No JVD. Trachea: No tracheal deviation. Cardiovascular:      Rate and Rhythm: Normal rate and regular rhythm. Heart sounds: Normal heart sounds. No murmur heard. Pulmonary:      Effort: Pulmonary effort is normal. No respiratory distress. Breath sounds: Normal breath sounds. Musculoskeletal:      Cervical back: Normal range of motion and neck supple. Lymphadenopathy:      Cervical: No cervical adenopathy. Skin:     General: Skin is warm and dry. Capillary Refill: Capillary refill takes less than 2 seconds. Coloration: Skin is not pale. Findings: No erythema. Neurological:      Mental Status: She is alert and oriented to person, place, and time. Mental status is at baseline. Motor: No weakness. Psychiatric:         Mood and Affect: Mood normal.         Behavior: Behavior normal.         Thought Content:  Thought content normal.            ------------------------------ ED COURSE/MEDICAL DECISION MAKING----------------------  Medications   HYDROcodone-acetaminophen (NORCO) 5-325 MG per tablet 1 tablet (has no administration in time range)   0.9 % sodium chloride bolus (1,000 mLs Intravenous New Bag 7/26/21 1918)   methylPREDNISolone sodium (SOLU-MEDROL) injection 125 mg (125 mg Intravenous Given 7/26/21 1920)   diphenhydrAMINE (BENADRYL) injection 25 mg (25 mg Intravenous Given 7/26/21 1922)   iopamidol (ISOVUE-370) 76 % injection 75 mL (75 mLs Intravenous Given 7/26/21 2017)         ED COURSE:      CT FACIAL BONES W CONTRAST   Final Result   1. Partial opacification of the middle ear cavities bilaterally. Complete   opacification of the left mastoid air cells with near complete opacification   of the right maxillary air cells. These findings may represent manifestation   of otomastoiditis or eustachian tube dysfunction. No bony destructive lesion   is seen. No abscess is identified. 2. 2.4 x 2.8 x 1.7 cm expansile cystic lesion in the right posterior maxilla,   resulting in erosion of the floor of the right maxillary sinus. The   morphology of the cyst is nonaggressive. It may represent a periapical cyst   or and a odontogenic keratocyst.   3. 7 x 5 mm enhancing mass along the roof of the left orbit, likely   representing a meningioma. 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 Patient has been seeing ENT and infectious disease. She was started on oral linezolid but stopped it due to think she was having allergic reaction. Patient saw her dentist today and was told that this is not a dental problem and advised to come to the ED. Upon arrival she is afebrile hemodynamically stable. She does have tenderness over the left TMJ with some mild overlying edema but no signs of any facial cellulitis and no mastoid tenderness.   She does have mild restricted range of motion. There does appear to be yellow drainage from the left TM with tube in place. Given the chronic infection, there was concern for mastoiditis or osteomyelitis of the mandible. Patient had labs 3 days ago with a white count of 11.6. Today her white count is improved to 9.9. CT of the facial bones shows no acute osteomyelitis or bony destruction. She does have some chronic sinus issues noted on the right. The left does show complete opacification of the left mastoid air cells and evidence of either otomastoiditis or complete eustachian tube dysfunction which correlates with patient's history. This is likely all due to the infectious process and inflammation. She is advised that this is most likely not an allergic reaction to the antibiotic and she is encouraged to resume taking it as soon as possible. She is also given pain medication. She is encouraged to continue following with ENT and infectious disease and advised to return to the emergency department for any new or worsening symptoms. Case was discussed with Dr. Reyes Pagan who is agreeable to the plan. Counseling: The emergency provider has spoken with the patient and friend and discussed todays results, in addition to providing specific details for the plan of care and counseling regarding the diagnosis and prognosis. Questions are answered at this time and they are agreeable with the plan.      --------------------------------- IMPRESSION AND DISPOSITION ---------------------------------    IMPRESSION  1. Jaw pain    2. Left chronic serous otitis media    3. Dysfunction of left eustachian tube        DISPOSITION  Disposition: Discharge to home  Patient condition is good      Electronically signed by Skye Mckeon PA-C   DD: 7/26/21  **This report was transcribed using voice recognition software.  Every effort was made to ensure accuracy; however, inadvertent computerized transcription errors may be

## 2021-07-27 ASSESSMENT — ENCOUNTER SYMPTOMS: FACIAL SWELLING: 1

## 2021-08-04 ENCOUNTER — OFFICE VISIT (OUTPATIENT)
Dept: FAMILY MEDICINE CLINIC | Age: 61
End: 2021-08-04
Payer: COMMERCIAL

## 2021-08-04 VITALS
SYSTOLIC BLOOD PRESSURE: 116 MMHG | WEIGHT: 177 LBS | OXYGEN SATURATION: 97 % | HEIGHT: 68 IN | DIASTOLIC BLOOD PRESSURE: 74 MMHG | TEMPERATURE: 98.5 F | BODY MASS INDEX: 26.83 KG/M2 | HEART RATE: 72 BPM | RESPIRATION RATE: 16 BRPM

## 2021-08-04 DIAGNOSIS — R31.9 URINARY TRACT INFECTION WITH HEMATURIA, SITE UNSPECIFIED: ICD-10-CM

## 2021-08-04 DIAGNOSIS — C43.71 MALIGNANT MELANOMA OF RIGHT LOWER EXTREMITY INCLUDING HIP (HCC): ICD-10-CM

## 2021-08-04 DIAGNOSIS — H69.83 CHRONIC DYSFUNCTION OF BOTH EUSTACHIAN TUBES: ICD-10-CM

## 2021-08-04 DIAGNOSIS — J30.9 ALLERGIC SINUSITIS: ICD-10-CM

## 2021-08-04 DIAGNOSIS — R30.0 DYSURIA: ICD-10-CM

## 2021-08-04 DIAGNOSIS — E55.9 VITAMIN D DEFICIENCY: ICD-10-CM

## 2021-08-04 DIAGNOSIS — N39.0 URINARY TRACT INFECTION WITH HEMATURIA, SITE UNSPECIFIED: ICD-10-CM

## 2021-08-04 DIAGNOSIS — B36.0 TINEA VERSICOLOR: ICD-10-CM

## 2021-08-04 DIAGNOSIS — A49.02 MRSA (METHICILLIN RESISTANT STAPHYLOCOCCUS AUREUS) INFECTION: Primary | ICD-10-CM

## 2021-08-04 DIAGNOSIS — K51.919 ULCERATIVE COLITIS WITH COMPLICATION, UNSPECIFIED LOCATION (HCC): ICD-10-CM

## 2021-08-04 DIAGNOSIS — J43.8 OTHER EMPHYSEMA (HCC): ICD-10-CM

## 2021-08-04 PROBLEM — K51.90 ULCERATIVE COLITIS (HCC): Status: ACTIVE | Noted: 2021-08-04

## 2021-08-04 PROBLEM — C43.72 MALIGNANT MELANOMA OF LEFT LOWER EXTREMITY INCLUDING HIP (HCC): Status: ACTIVE | Noted: 2021-08-04

## 2021-08-04 LAB
BILIRUBIN, POC: NEGATIVE
BLOOD URINE, POC: ABNORMAL
CLARITY, POC: ABNORMAL
COLOR, POC: ABNORMAL
GLUCOSE URINE, POC: NEGATIVE
KETONES, POC: NEGATIVE
LEUKOCYTE EST, POC: ABNORMAL
NITRITE, POC: NEGATIVE
PH, POC: 6
PROTEIN, POC: 30
SPECIFIC GRAVITY, POC: >=1.03
UROBILINOGEN, POC: 0.2

## 2021-08-04 PROCEDURE — 81002 URINALYSIS NONAUTO W/O SCOPE: CPT | Performed by: FAMILY MEDICINE

## 2021-08-04 PROCEDURE — 99204 OFFICE O/P NEW MOD 45 MIN: CPT | Performed by: FAMILY MEDICINE

## 2021-08-04 RX ORDER — FLUCONAZOLE 150 MG/1
150 TABLET ORAL ONCE
Qty: 1 TABLET | Refills: 1 | Status: SHIPPED | OUTPATIENT
Start: 2021-08-04 | End: 2021-08-04

## 2021-08-04 RX ORDER — BUDESONIDE AND FORMOTEROL FUMARATE DIHYDRATE 160; 4.5 UG/1; UG/1
2 AEROSOL RESPIRATORY (INHALATION) 2 TIMES DAILY
COMMUNITY
Start: 2021-07-30 | End: 2021-11-10 | Stop reason: SDUPTHER

## 2021-08-04 RX ORDER — MAGNESIUM HYDROXIDE 1200 MG/15ML
LIQUID ORAL
COMMUNITY
Start: 2021-07-30 | End: 2021-11-10

## 2021-08-04 RX ORDER — NEOMYCIN SULFATE, POLYMYXIN B SULFATE AND HYDROCORTISONE 10; 3.5; 1 MG/ML; MG/ML; [USP'U]/ML
SUSPENSION/ DROPS AURICULAR (OTIC)
COMMUNITY
Start: 2021-07-30 | End: 2021-11-10

## 2021-08-04 RX ORDER — ERGOCALCIFEROL 1.25 MG/1
50000 CAPSULE ORAL WEEKLY
COMMUNITY
End: 2021-11-12 | Stop reason: ALTCHOICE

## 2021-08-04 RX ORDER — NITROFURANTOIN 25; 75 MG/1; MG/1
100 CAPSULE ORAL 2 TIMES DAILY
Qty: 20 CAPSULE | Refills: 0 | Status: SHIPPED | OUTPATIENT
Start: 2021-08-04 | End: 2021-08-14

## 2021-08-04 SDOH — ECONOMIC STABILITY: FOOD INSECURITY: WITHIN THE PAST 12 MONTHS, YOU WORRIED THAT YOUR FOOD WOULD RUN OUT BEFORE YOU GOT MONEY TO BUY MORE.: NEVER TRUE

## 2021-08-04 SDOH — ECONOMIC STABILITY: FOOD INSECURITY: WITHIN THE PAST 12 MONTHS, THE FOOD YOU BOUGHT JUST DIDN'T LAST AND YOU DIDN'T HAVE MONEY TO GET MORE.: NEVER TRUE

## 2021-08-04 ASSESSMENT — PATIENT HEALTH QUESTIONNAIRE - PHQ9
SUM OF ALL RESPONSES TO PHQ QUESTIONS 1-9: 0
SUM OF ALL RESPONSES TO PHQ9 QUESTIONS 1 & 2: 0
SUM OF ALL RESPONSES TO PHQ QUESTIONS 1-9: 0
SUM OF ALL RESPONSES TO PHQ QUESTIONS 1-9: 0
2. FEELING DOWN, DEPRESSED OR HOPELESS: 0
1. LITTLE INTEREST OR PLEASURE IN DOING THINGS: 0

## 2021-08-04 ASSESSMENT — SOCIAL DETERMINANTS OF HEALTH (SDOH): HOW HARD IS IT FOR YOU TO PAY FOR THE VERY BASICS LIKE FOOD, HOUSING, MEDICAL CARE, AND HEATING?: NOT HARD AT ALL

## 2021-08-04 ASSESSMENT — ENCOUNTER SYMPTOMS: ALLERGIC/IMMUNOLOGIC NEGATIVE: 1

## 2021-08-04 NOTE — PROGRESS NOTES
2021    Jayjay Becker (:  1960) is a 63 y.o. female, here for evaluation of the following medical concerns:  Chief Complaint   Patient presents with    New Patient    Ear Problem     seeing Otol[facility]ologist [facility]    Rash     bilateral legs, x several years, bumps,     COPD       HPI    1. MRSA (methicillin resistant Staphylococcus aureus) infection  2. Vitamin D deficiency  3. Malignant melanoma of left lower extremity including hip ([address]  4. Chronic dysfunction of both eustachian tubes  5. Other emphysema ([address]  6. Allergic sinusitis  7. Ulcerative colitis with complication, unspecified location ([address]  8. Urinary tract infection with hematuria, site unspecified  -     Culture, Urine; Future  9. Dysuria  -     POCT Urinalysis no Micro    Tinea versicolor= otc clotrimazole    Allergies   Allergen Reactions    Adhesive Tape Other (See Comments)     Can cause bloody skin    Augmentin [Amoxicillin-Pot Clavulanate] Swelling     Hands swell like gloves    Bactrim [Sulfamethoxazole-Trimethoprim] Other (See Comments)     Hands swelled    Cipro [Ciprofloxacin Hcl]      Breast pain    Doxycycline     Sulfa Antibiotics Other (See Comments)     Hands swelled    Tetracyclines & Related Other (See Comments)     unsure    Vancomycin     Iv Dye [Iodides] Rash       Prior to Visit Medications    Medication Sig Taking? Authorizing Provider   vitamin D (ERGOCALCIFEROL) 1.25 MG (83405 UT) CAPS capsule Take 50,000 Units by mouth once a week Yes Historical Provider, MD   budesonide-formoterol (SYMBICORT) 160-4.5 MCG/ACT AERO Inhale 2 puffs into the lungs 2 times daily Yes Historical Provider, MD   neomycin-polymyxin-hydrocortisone (CORTISPORIN) 3.5-67870-9 otic suspension INSTILL 3 DROPS INTO AFFECTED EAR TWICE A DAY AS DIRECTED Yes Historical Provider, MD   fluticasone (FLONASE) 50 MCG/ACT nasal spray 2 sprays by Nasal route daily Use in both nostrils.  Yes Sharon Males, APRN - CNP   Probiotic Product (CULTURELLE IMMUNE DEFENSE PO) Take by mouth Yes Historical Provider, MD   azelastine (ASTELIN) 0.1 % nasal spray 2 sprays by Nasal route 2 times daily Use in each nostril as directed Yes BERYL Lowe CNP   sodium chloride 0.9 % irrigation DISSOLVE 1 TUBE OF OINTMENT INTO 1 LITER OF SALINE AND [address] EAR AS DIRECTED  Historical Provider, MD          There is no immunization history on file for this patient. Past Surgical History:   Procedure Laterality Date    ABDOMEN SURGERY      multiple d/t severe ulcerative colitis  rectum & lge intestine has been removed . only has 4 feet of small intestine left. ...has an internal BCIR([place] internal resevoir)was unable to tolerate external d/t severe ahesive allergy.  has to self catherize her bowel every couple of hrs    COLONOSCOPY     [address]    upper jaw (has 4 wires holding her jaw up)    MYRINGOTOMY Bilateral 3/11/2021    BILATERAL MYRINGOTOMY  WITH TUBE, BILATERAL EUSTACHIAN TUBE BALLOON DILATION (CPT 87008, 06024) performed by Malik Johnson DO at [facility] 2013    excision melanoma left hip    OTHER SURGICAL HISTORY Bilateral 03/11/2021    myringotomy with tube bilateral eustatian tube baloon dilation    SKIN BIOPSY         Social History     Socioeconomic History    Marital status:      Spouse name: Not on file    Number of children: Not on file    Years of education: Not on file    Highest education level: Not on file   Occupational History    Not on file   Tobacco Use    Smoking status: Current Every Day Smoker     Packs/day: 0.50     Years: 30.00     Pack years: 15.00     Types: Cigarettes    Smokeless tobacco: Never Used   Vaping Use    Vaping Use: Never used   Substance and Sexual Activity    Alcohol use: Yes     Comment: wine 4 x per week    Drug use: Never    Sexual activity: Not on file   Other Topics Concern    Not on file   Social History Narrative  Not on file     Social Determinants of Health     Financial Resource Strain: Low Risk     Difficulty of Paying Living Expenses: Not hard at all   Food Insecurity: No Food Insecurity    Worried About Running Out of Food in the Last Year: Never true    [address] in the Last Year: Never true   Transportation Needs:     Lack of Transportation (Medical):  Lack of Transportation (Non-Medical):    Physical Activity:     Days of Exercise per Week:     Minutes of Exercise per Session:    Stress:     Feeling of Stress :    Social Connections:     Frequency of Communication with Friends and Family:     Frequency of Social Gatherings with Friends and Family:     Attends [employer] Services:     Active Member of Clubs or Organizations:     Attends Club or Organization Meetings:     Marital Status:    Intimate Partner Violence:     Fear of Current or Ex-Partner:     Emotionally Abused:     Physically Abused:     Sexually Abused:         History reviewed. No pertinent family history. Review of Systems   Constitutional: Negative. HENT: Positive for ear discharge. Eyes:        Last eye exam: 1917   Respiratory:        COPD   Cardiovascular: Negative. Gastrointestinal:        UC; colectomy; Internal [place]. Ventral hernia. Endocrine: Negative. Genitourinary: Positive for dysuria. Musculoskeletal: Negative. Skin:        Melanoma of right hip. Allergic/Immunologic: Negative. Neurological: Positive for dizziness. Hematological: Negative. Psychiatric/Behavioral: Positive for dysphoric mood. Vitals:    08/04/21 0952   BP: 116/74   Pulse: 72   Resp: 16   Temp: 98.5 °F (36.9 °C)   TempSrc: Oral   SpO2: 97%   Weight: 177 lb (80.3 kg)   Height: 5' 8\" (1.727 m)       Estimated body mass index is 26.91 kg/m² as calculated from the following:    Height as of this encounter: 5' 8\" (1.727 m). Weight as of this encounter: 177 lb (80.3 kg).     BP Readings from Last 3 Encounters:   08/04/21 116/74   07/26/21 (!) 145/75   07/16/21 121/83        Physical Exam  Vitals and nursing note reviewed. Constitutional:       General: She is not in acute distress. Appearance: Normal appearance. She is not toxic-appearing or diaphoretic. HENT:      Head: Normocephalic and atraumatic. Right Ear: Tympanic membrane, ear canal and external ear normal. There is no impacted cerumen. Left Ear: Tympanic membrane, ear canal and external ear normal. There is no impacted cerumen. Nose: Nose normal. No congestion or rhinorrhea. Mouth/Throat:      Mouth: Mucous membranes are moist.      Pharynx: Oropharynx is clear. No oropharyngeal exudate or posterior oropharyngeal erythema. Eyes:      General: No scleral icterus. Right eye: No discharge. Left eye: No discharge. Extraocular Movements: Extraocular movements intact. Conjunctiva/sclera: Conjunctivae normal.      Pupils: Pupils are equal, round, and reactive to light. Neck:      Vascular: No carotid bruit. Cardiovascular:      Rate and Rhythm: Normal rate and regular rhythm. Pulses: Normal pulses. Heart sounds: Normal heart sounds. No murmur heard. No friction rub. No gallop. Pulmonary:      Effort: Pulmonary effort is normal. No respiratory distress. Breath sounds: Normal breath sounds. No stridor. No wheezing, rhonchi or rales. Abdominal:      General: Abdomen is flat. Bowel sounds are normal. There is no distension. Palpations: Abdomen is soft. There is no mass. Tenderness: There is no abdominal tenderness. There is no right CVA tenderness, left CVA tenderness, guarding or rebound. Musculoskeletal:         General: No swelling, tenderness, deformity or signs of injury. Normal range of motion. Cervical back: Normal range of motion and neck supple. No rigidity. No muscular tenderness. Right lower leg: No edema. Left lower leg: No edema. Lymphadenopathy:      Cervical: No cervical adenopathy. Skin:     General: Skin is warm and dry. Capillary Refill: Capillary refill takes less than 2 seconds. Coloration: Skin is not jaundiced. Findings: No lesion or rash. Neurological:      General: No focal deficit present. Mental Status: She is alert and oriented to person, place, and time. Cranial Nerves: No cranial nerve deficit. Sensory: No sensory deficit. Motor: No weakness. Coordination: Coordination normal.      Deep Tendon Reflexes: Reflexes normal.   Psychiatric:         Mood and Affect: Mood normal.         Behavior: Behavior normal.         ASSESSMENT/PLAN:  Rosario Demarco was seen today for new patient, ear problem, rash and copd. Diagnoses and all orders for this visit:    MRSA (methicillin resistant Staphylococcus aureus) infection    Vitamin D deficiency    Malignant melanoma of left lower extremity including hip ([facility] [address]    Chronic dysfunction of both eustachian tubes    Other emphysema (HCC)    Allergic sinusitis    Ulcerative colitis with complication, unspecified location [facility])    Urinary tract infection with hematuria, site unspecified  -     Culture, Urine; Future    Dysuria  -     POCT Urinalysis no Micro         Return in about 3 months (around 11/4/2021). An  electronic signature was used to authenticate this note.     --Bladimir Dyer, DO on 8/4/2021 at 11:20 AM

## 2021-08-06 LAB
ORGANISM: ABNORMAL
URINE CULTURE, ROUTINE: ABNORMAL

## 2021-10-28 NOTE — ANESTHESIA POSTPROCEDURE EVALUATION
Department of Anesthesiology  Postprocedure Note    Patient: Kandy Gomez  MRN: [id number]  [place]: 1960  Date of evaluation: 3/11/2021  Time:  3:02 PM     Procedure Summary     Date: 03/11/21 Room / Location: [address] [place] [address] 01 / [address]    Anesthesia Start: 5951 Anesthesia Stop: 2656    Procedure: BILATERAL MYRINGOTOMY  WITH TUBE, BILATERAL EUSTACHIAN TUBE [address] (CPT [id number], 33503) (Bilateral ) Diagnosis: (CHRONIC OTITIS MEDIA WITH EFFUSION AND EUSTACHIAN TUBE DYSFUNCTION)    Surgeons: Rome Freitas DO Responsible Provider: Mohan Altamirano MD    Anesthesia Type: general ASA Status: 3          Anesthesia Type: general    Galo Phase I: Galo Score: 10    Galo Phase II:      Last vitals: Reviewed and per EMR flowsheets.        Anesthesia Post Evaluation    Patient location during evaluation: PACU  Patient participation: complete - patient participated  Level of consciousness: awake  Airway patency: patent  Nausea & Vomiting: no nausea and no vomiting  Complications: no  Cardiovascular status: hemodynamically stable  Respiratory status: room air and spontaneous ventilation  Hydration status: stable
Statement Selected

## 2021-11-10 ENCOUNTER — OFFICE VISIT (OUTPATIENT)
Dept: FAMILY MEDICINE CLINIC | Age: 61
End: 2021-11-10
Payer: COMMERCIAL

## 2021-11-10 VITALS
RESPIRATION RATE: 18 BRPM | DIASTOLIC BLOOD PRESSURE: 74 MMHG | TEMPERATURE: 97.3 F | WEIGHT: 179.9 LBS | HEART RATE: 76 BPM | HEIGHT: 68 IN | BODY MASS INDEX: 27.26 KG/M2 | OXYGEN SATURATION: 96 % | SYSTOLIC BLOOD PRESSURE: 130 MMHG

## 2021-11-10 DIAGNOSIS — Z12.31 SCREENING MAMMOGRAM FOR BREAST CANCER: ICD-10-CM

## 2021-11-10 DIAGNOSIS — Z76.89 ENCOUNTER TO ESTABLISH CARE: Primary | ICD-10-CM

## 2021-11-10 DIAGNOSIS — J43.8 OTHER EMPHYSEMA (HCC): ICD-10-CM

## 2021-11-10 DIAGNOSIS — Z86.39 HX OF HYPERLIPIDEMIA: ICD-10-CM

## 2021-11-10 DIAGNOSIS — E55.9 VITAMIN D DEFICIENCY: ICD-10-CM

## 2021-11-10 DIAGNOSIS — Z13.820 SCREENING FOR OSTEOPOROSIS: ICD-10-CM

## 2021-11-10 DIAGNOSIS — Z13.1 SCREENING FOR DIABETES MELLITUS: ICD-10-CM

## 2021-11-10 PROBLEM — J30.9 ALLERGIC SINUSITIS: Status: RESOLVED | Noted: 2021-08-04 | Resolved: 2021-11-10

## 2021-11-10 PROBLEM — B36.0 TINEA VERSICOLOR: Status: RESOLVED | Noted: 2021-08-04 | Resolved: 2021-11-10

## 2021-11-10 PROBLEM — R31.9 URINARY TRACT INFECTION WITH HEMATURIA: Status: RESOLVED | Noted: 2021-08-04 | Resolved: 2021-11-10

## 2021-11-10 PROBLEM — A49.02 MRSA (METHICILLIN RESISTANT STAPHYLOCOCCUS AUREUS) INFECTION: Status: RESOLVED | Noted: 2021-06-28 | Resolved: 2021-11-10

## 2021-11-10 PROBLEM — R30.0 DYSURIA: Status: RESOLVED | Noted: 2021-08-04 | Resolved: 2021-11-10

## 2021-11-10 PROBLEM — K51.90 ULCERATIVE COLITIS (HCC): Status: RESOLVED | Noted: 2021-08-04 | Resolved: 2021-11-10

## 2021-11-10 PROBLEM — N39.0 URINARY TRACT INFECTION WITH HEMATURIA: Status: RESOLVED | Noted: 2021-08-04 | Resolved: 2021-11-10

## 2021-11-10 PROCEDURE — 99214 OFFICE O/P EST MOD 30 MIN: CPT | Performed by: NURSE PRACTITIONER

## 2021-11-10 RX ORDER — BUDESONIDE AND FORMOTEROL FUMARATE DIHYDRATE 160; 4.5 UG/1; UG/1
2 AEROSOL RESPIRATORY (INHALATION) 2 TIMES DAILY
Qty: 10.2 G | Refills: 3 | Status: SHIPPED | OUTPATIENT
Start: 2021-11-10 | End: 2022-04-13 | Stop reason: SDUPTHER

## 2021-11-10 RX ORDER — CHOLECALCIFEROL (VITAMIN D3) 100000/G
POWDER (GRAM) MISCELLANEOUS WEEKLY
COMMUNITY

## 2021-11-10 ASSESSMENT — ENCOUNTER SYMPTOMS
WHEEZING: 1
SHORTNESS OF BREATH: 1
NAUSEA: 0
CONSTIPATION: 0
TROUBLE SWALLOWING: 0
VOICE CHANGE: 0
VOMITING: 0
RHINORRHEA: 0
SINUS PAIN: 0
COLOR CHANGE: 0
COUGH: 1
FACIAL SWELLING: 0
CHEST TIGHTNESS: 0
SINUS PRESSURE: 0
BACK PAIN: 0
DIARRHEA: 0
ABDOMINAL PAIN: 0
SORE THROAT: 0

## 2021-11-10 NOTE — PATIENT INSTRUCTIONS
The medication list included in this document is our record of what you are currently taking, including any changes that were made at today's visit.  If you find any differences when compared to your medications at home, or have any questions that were not answered at your visit, please contact the office. Patient Education        Learning About Emphysema  What is emphysema? Emphysema is a long-term (chronic) lung disease. In emphysema, the tiny air sacs (alveoli) at the end of the airways in the lungs are damaged. When the air sacs are damaged or destroyed, the inner walls break down and the sacs become larger. These larger air sacs move less oxygen into the blood. This causes difficulty breathing or shortness of breath that gets worse over time. After air sacs are destroyed, they cannot be replaced. Emphysema is a type of COPD (chronic obstructive pulmonary disease). Emphysema is usually caused by smoking. But chemical fumes, dust, or air pollution also can cause it over time. People who get it in their 35s or 45s may have a disorder that runs in families, called alpha-1 antitrypsin deficiency. But this is rare. What can you expect when you have emphysema? Emphysema gets worse over time. You cannot undo the damage to your lungs. Over time, you may find that:  · You get short of breath even when you do things like get dressed or fix a meal.  · It is hard to eat or exercise. · You feel weaker and limit activity. But there are things you can do to prevent more damage and feel better. What are the symptoms? The main symptoms of emphysema are:  · A cough that will not go away. · Mucus that comes up when you cough. · Shortness of breath that gets worse when you exercise. At times, your symptoms may suddenly flare up and get much worse. This is a called an exacerbation (say \"egg-ZAMIMA-er-BAY-shun\"). When this happens, your usual symptoms quickly get worse and stay bad. This can be dangerous.  You may have first.  Other care  · If your doctor recommends it, get more exercise. Walking is a good choice. Bit by bit, increase the amount you walk every day. Try for at least 30 minutes on most days of the week. · Learn breathing methodssuch as breathing through pursed lipsto help you become less short of breath. · If your doctor has not set you up with a pulmonary rehabilitation program, ask your doctor if rehab is right for you. Rehab includes exercise programs, education about your disease and how to manage it, help with diet and other changes, and emotional support. · Eat regular, healthy meals. Use bronchodilators about 1 hour before you eat to make it easier to eat. Try eating smaller, frequent meals so your stomach is never too full. A full stomach can push on the muscle that helps you breathe (your diaphragm) and make it harder to breathe. Drink beverages at the end of the meal. Avoid foods that are hard to chew. Follow-up care is a key part of your treatment and safety. Be sure to make and go to all appointments, and call your doctor if you are having problems. It's also a good idea to know your test results and keep a list of the medicines you take. Where can you learn more? Go to https://[employer]. org and sign in to your [employer] account. Enter B859 in the [facility] box to learn more about \"Learning About Emphysema. \"     If you do not have an account, please click on the \"Sign Up Now\" link. Current as of: July 6, 2021               Content Version: 13.0  © 2006-2021 Healthwise, Incorporated. Care instructions adapted under license by [facility] [place] ([facility]). If you have questions about a medical condition or this instruction, always ask your healthcare professional. [place] [address] any warranty or liability for your use of this information. Patient Education        Learning About Breast Cancer Screening  What is breast cancer screening?      Breast cancer occurs when cells that are not normal grow in one or both of your breasts. Screening tests can help find breast cancer early. Cancer is easier to treat when it's found early. Having concerns about breast cancer is common. That's why it's important to talk with your doctor about when to start and how often to get screened for breast cancer. How is breast cancer screening done? Several screening tests can be used to check for breast cancer. Mammograms. These tests check for signs of cancer using X-rays. They can show tumors that are too small for you or your doctor to feel. During a mammogram, a machine squeezes your breasts to make them flatter and easier to X-ray. At least two pictures are taken of each breast. One is taken from the top and one from the side. 3-D mammograms. These tests are also called digital breast tomosynthesis. Your breast is positioned on a flat plate. A top plate is pressed against your breast to keep it in position. The X-ray arm then moves in an arc above the breast and takes many pictures. A computer uses these X-rays to create a three-dimensional image. Clinical breast exam.   In this exam, your doctor carefully feels your breasts and under your arms to check for lumps or other changes. Who should be screened for breast cancer? Experts agree that mammograms are the best screening test for people at average risk of breast cancer. But they don't all agree on the age at which screening should start. And they don't agree on whether it's better to be screened every year or every two years. Here are some of the recommendations from experts:  · Start by age 36 and have a mammogram each year. · Start at age 39 and have a mammogram each year. · Start at age 48 and have a mammogram every 2 years. When to stop having mammograms is another decision. You and your doctor can decide on the right age to start and stop screening based on your personal preferences and overall health.   What is your risk for breast cancer? If you don't already know your risk of breast cancer, you can ask your doctor about it. You can also look it up at www.cancer.gov/bcrisktool/. If your doctor says that you have a high or very high risk, ask about ways to reduce your risk. These could include getting extra screening, taking medicine, or having surgery. If you have a strong family history of breast cancer, ask your doctor about genetic testing. What steps can you take to stay healthy? Some things that increase your risk of breast cancer, such as your age and being female, cannot be controlled. But you can do some things to stay as healthy as you can. · Learn what your breasts normally look and feel like. If you notice any changes, tell your doctor. · If you drink alcohol, limit how much you drink. Any amount of alcohol may increase your risk for some types of cancer. · If you smoke, quit. When you quit smoking, you lower your chances of getting many types of cancer. You can also do your best to eat well, be active, and stay at a healthy weight. Eating healthy foods and being active every day, as well as staying at a healthy weight, may help prevent cancer. Where can you learn more? Go to https://[employer]. org and sign in to your [facility] account. Enter T163 in the [employer] box to learn more about \"Learning About Breast Cancer Screening. \"     If you do not have an account, please click on the \"Sign Up Now\" link. Current as of: December 17, 2020               Content Version: 13.0  © 2006-2021 Healthwise, Incorporated. Care instructions adapted under license by [facility] ([facility]). If you have questions about a medical condition or this instruction, always ask your healthcare professional. [place] [address] any warranty or liability for your use of this information.

## 2021-11-10 NOTE — ASSESSMENT & PLAN NOTE
New Unclear control, continue current plan pending work up below, medication adherence emphasized and lifestyle modifications recommended

## 2021-11-10 NOTE — ASSESSMENT & PLAN NOTE
New Unclear control, lifestyle modifications recommended and work on smoking cessation, call [facility] to schedule mammogram and DEXA, no calcium the day of the test. Fasting labs tomorrow

## 2021-11-10 NOTE — ASSESSMENT & PLAN NOTE
work on smoking cessation, call [facility] to schedule mammogram and DEXA, no calcium the day of the test. Fasting labs tomorrow

## 2021-11-10 NOTE — ASSESSMENT & PLAN NOTE
New Borderline controlled, continue current plan pending work up below, medication adherence emphasized, lifestyle modifications recommended and discussed the need to use Symbicort 2 puffs every 12 hours not just as needed, start plain mucinex 600 mg twice a day to help thin the mucous and work on cutting down on the green tea and increase water intake. Recommend Covid vaccine and flu vaccine as well as PPSV 23 she states she will get from the pharmacy as it is cheaper.

## 2021-11-10 NOTE — PROGRESS NOTES
OFFICE PROGRESS NOTE  [address]  [address] [facility] [address]  Dept: [phone]   Chief Complaint   Patient presents with   [address]     dr. Phil Alonzo pt. ASSESSMENT/PLAN   1. Encounter to establish care  2. Other emphysema ([address]  Assessment & Plan:  New Borderline controlled, continue current plan pending work up below, medication adherence emphasized, lifestyle modifications recommended and discussed the need to use Symbicort 2 puffs every 12 hours not just as needed, start plain mucinex 600 mg twice a day to help thin the mucous and work on cutting down on the green tea and increase water intake. Recommend Covid vaccine and flu vaccine as well as PPSV 23 she states she will get from the pharmacy as it is cheaper. Orders:  -     CBC Auto Differential; Future  -     Comprehensive Metabolic Panel; Future  -     budesonide-formoterol (SYMBICORT) 160-4.5 MCG/ACT AERO; Inhale 2 puffs into the lungs 2 times daily, Disp-10.2 g, R-3Print  3. Vitamin D deficiency  Assessment & Plan:  New Unclear control, continue current plan pending work up below, medication adherence emphasized and lifestyle modifications recommended  Orders:  -     Vitamin D 25 Hydroxy; Future  4. Screening mammogram for breast cancer  Assessment & Plan:   work on smoking cessation, call [facility] to schedule mammogram and DEXA, no calcium the day of the test. Fasting labs tomorrow  Orders:  -     LAKHWINDER SOFI DIGITAL SCREEN BILATERAL; Future  5. Screening for osteoporosis  Assessment & Plan:  New Unclear control, lifestyle modifications recommended and work on smoking cessation, call [facility] to schedule mammogram and DEXA, no calcium the day of the test. Fasting labs tomorrow  Orders:  -     DEXA BONE DENSITY AXIAL SKELETON; Future  6. Screening for diabetes mellitus  Assessment & Plan:    diabetes will check A1c  Orders:  -     Hemoglobin A1C; Future  7.  Hx of hyperlipidemia  Assessment & Plan:   Unclear control, continue current plan pending work up below and lifestyle modifications recommended  Orders:  -     Lipid Panel; Future         Discussed reducing caffeine intake, Discussed smoking cessation, Discussed exercising 30 minutes daily and Discussed taking medications as directed and adverse effects    Return in about 3 months (around 2/10/2022) for pap and breast exam.     HPI:   Here today to establish care was seeing DR Dianne Mota    She is due for fasting labs, mammogram, DEXA. She had colectomy done so no need for colonoscopy. She has hx of melanoma, emphysema, she has hx of fractured foot in July. She has vitamin D deficiency. She walks every day and yesterday did 10 miles doing yard work. COPD: Patient complains of dyspnea, cough, wheezing and moist cough. Symptoms began several years ago. Symptoms chronic dyspnea and cough productive of clear sputum in small amounts does not worsen with exertion. Sputum is clear  in small amounts can't always expectorate. Afebrile. Patient uses 1 pillows at night. Patient can walk few miles before resting. Patient currently is not on home oxygen therapy. Calli Coelho Respiratory history: emphysema        Current Outpatient Medications:     Cholecalciferol (VITAMIN D3) 847165 UNIT/GM POWD, by Does not apply route once a week, Disp: , Rfl:     budesonide-formoterol (SYMBICORT) 160-4.5 MCG/ACT AERO, Inhale 2 puffs into the lungs 2 times daily, Disp: 10.2 g, Rfl: 3    fluticasone (FLONASE) 50 MCG/ACT nasal spray, 2 sprays by Nasal route daily Use in both nostrils. , Disp: 1 Bottle, Rfl: 3    Probiotic Product (CULTURELLE IMMUNE DEFENSE PO), Take by mouth, Disp: , Rfl:     azelastine (ASTELIN) 0.1 % nasal spray, 2 sprays by Nasal route 2 times daily Use in each nostril as directed, Disp: 1 Bottle, Rfl: 1    vitamin D (ERGOCALCIFEROL) 1.25 MG (43710 UT) CAPS capsule, Take 50,000 Units by mouth once a week (Patient not taking: Reported on 11/10/2021), Disp: , Rfl:       Surgical History:  has a past surgical history that includes other surgical history (2013); Cosmetic surgery (1983); Colonoscopy; Abdomen surgery; skin biopsy; other surgical history (Bilateral, 03/11/2021); and myringotomy (Bilateral, 3/11/2021). Social History:  reports that she has been smoking cigarettes. She has a 15.00 pack-year smoking history. She has never used smokeless tobacco. She reports current alcohol use. She reports that she does not use drugs. Family History: family history includes Alcohol Abuse in her brother; Cancer in her brother; Colon Cancer (age of onset: 80) in her paternal grandmother; Heart Attack in her maternal grandmother; Heart Disease in her brother, father, maternal grandmother, and mother; Kidney Disease in her father; Leukemia in her brother. I have reviewed Kenya's allergies, medications, problem list, medical, social and family history and have updated as needed in the electronic medical record    Review of Systems   Constitutional: Negative for activity change, appetite change, chills, diaphoresis, fatigue, fever and unexpected weight change. HENT: Positive for postnasal drip. Negative for congestion, dental problem, drooling, ear discharge, ear pain, facial swelling, hearing loss, mouth sores, nosebleeds, rhinorrhea, sinus pressure, sinus pain, sneezing, sore throat, tinnitus, trouble swallowing and voice change. Eyes: Negative for visual disturbance. Respiratory: Positive for cough, shortness of breath and wheezing. Negative for chest tightness. Cardiovascular: Negative for chest pain, palpitations and leg swelling. Gastrointestinal: Negative for abdominal pain, constipation, diarrhea, nausea and vomiting. Endocrine: Negative for cold intolerance, heat intolerance, polydipsia, polyphagia and polyuria. Genitourinary: Negative for difficulty urinating, frequency and urgency.    Musculoskeletal: Negative for arthralgias, bilaterally, no wheezes, rales or rhonchi, normal air movement, no respiratory distress  Abdomen: soft, non-tender, non-distended, normal bowel sounds, no masses or hepatosplenomegaly  Musculoskeletal: Normal ROM, no joint swelling, deformity or tenderness   Neurologic: reflexes normal and symmetric, no cranial nerve deficit, gait, coordination and speech normal  Extremities: no clubbing, cyanosis, or edema. Psychiatric: Good eye contact, normal mood and affect, answers questions appropriately    I have reviewed my findings and recommendations with Minh Luis.     Anthony Anderson, BERYL - CNP, NP-C, FNP-BC

## 2021-11-11 DIAGNOSIS — Z13.1 SCREENING FOR DIABETES MELLITUS: ICD-10-CM

## 2021-11-11 DIAGNOSIS — E55.9 VITAMIN D DEFICIENCY: ICD-10-CM

## 2021-11-11 DIAGNOSIS — J43.8 OTHER EMPHYSEMA (HCC): ICD-10-CM

## 2021-11-11 DIAGNOSIS — Z86.39 HX OF HYPERLIPIDEMIA: ICD-10-CM

## 2021-11-11 LAB
ALBUMIN SERPL-MCNC: 4.2 G/DL (ref 3.5–5.2)
ALP BLD-CCNC: 108 U/L (ref 35–104)
ALT SERPL-CCNC: 12 U/L (ref 0–32)
ANION GAP SERPL CALCULATED.3IONS-SCNC: 18 MMOL/L (ref 7–16)
AST SERPL-CCNC: 22 U/L (ref 0–31)
BASOPHILS ABSOLUTE: 0.06 E9/L (ref 0–0.2)
BASOPHILS RELATIVE PERCENT: 0.8 % (ref 0–2)
BILIRUB SERPL-MCNC: 0.7 MG/DL (ref 0–1.2)
BUN BLDV-MCNC: 17 MG/DL (ref 6–20)
CALCIUM SERPL-MCNC: 9.6 MG/DL (ref 8.6–10.2)
CHLORIDE BLD-SCNC: 103 MMOL/L (ref 98–107)
CHOLESTEROL, TOTAL: 246 MG/DL (ref 0–199)
CO2: 16 MMOL/L (ref 22–29)
CREAT SERPL-MCNC: 0.7 MG/DL (ref 0.5–1)
EOSINOPHILS ABSOLUTE: 0.16 E9/L (ref 0.05–0.5)
EOSINOPHILS RELATIVE PERCENT: 2 % (ref 0–6)
GFR AFRICAN AMERICAN: >60
GFR NON-AFRICAN AMERICAN: >60 ML/MIN/1.73
GLUCOSE BLD-MCNC: 106 MG/DL (ref 74–99)
HBA1C MFR BLD: 5.7 % (ref 4–5.6)
HCT VFR BLD CALC: 43.6 % (ref 34–48)
HDLC SERPL-MCNC: 113 MG/DL
HEMOGLOBIN: 14.3 G/DL (ref 11.5–15.5)
IMMATURE GRANULOCYTES #: 0.03 E9/L
IMMATURE GRANULOCYTES %: 0.4 % (ref 0–5)
LDL CHOLESTEROL CALCULATED: 109 MG/DL (ref 0–99)
LYMPHOCYTES ABSOLUTE: 1.35 E9/L (ref 1.5–4)
LYMPHOCYTES RELATIVE PERCENT: 17 % (ref 20–42)
MCH RBC QN AUTO: 33.1 PG (ref 26–35)
MCHC RBC AUTO-ENTMCNC: 32.8 % (ref 32–34.5)
MCV RBC AUTO: 100.9 FL (ref 80–99.9)
MONOCYTES ABSOLUTE: 0.64 E9/L (ref 0.1–0.95)
MONOCYTES RELATIVE PERCENT: 8.1 % (ref 2–12)
NEUTROPHILS ABSOLUTE: 5.7 E9/L (ref 1.8–7.3)
NEUTROPHILS RELATIVE PERCENT: 71.7 % (ref 43–80)
PDW BLD-RTO: 13.4 FL (ref 11.5–15)
PLATELET # BLD: 277 E9/L (ref 130–450)
PMV BLD AUTO: 9.9 FL (ref 7–12)
POTASSIUM SERPL-SCNC: 4.6 MMOL/L (ref 3.5–5)
RBC # BLD: 4.32 E12/L (ref 3.5–5.5)
SODIUM BLD-SCNC: 137 MMOL/L (ref 132–146)
TOTAL PROTEIN: 7.5 G/DL (ref 6.4–8.3)
TRIGL SERPL-MCNC: 119 MG/DL (ref 0–149)
VITAMIN D 25-HYDROXY: 55 NG/ML (ref 30–100)
VLDLC SERPL CALC-MCNC: 24 MG/DL
WBC # BLD: 7.9 E9/L (ref 4.5–11.5)

## 2021-11-23 ENCOUNTER — HOSPITAL ENCOUNTER (OUTPATIENT)
Dept: MAMMOGRAPHY | Age: 61
Discharge: HOME | End: 2021-11-25
Payer: COMMERCIAL

## 2021-11-23 VITALS — HEIGHT: 68 IN | WEIGHT: 179 LBS | BODY MASS INDEX: 27.13 KG/M2

## 2021-11-23 DIAGNOSIS — Z13.820 SCREENING FOR OSTEOPOROSIS: ICD-10-CM

## 2021-11-23 DIAGNOSIS — Z12.31 SCREENING MAMMOGRAM FOR BREAST CANCER: ICD-10-CM

## 2021-11-23 PROCEDURE — 77080 DXA BONE DENSITY AXIAL: CPT

## 2021-11-23 PROCEDURE — 77063 BREAST TOMOSYNTHESIS BI: CPT

## 2021-11-29 DIAGNOSIS — R92.8 ABNORMAL MAMMOGRAM OF BOTH BREASTS: Primary | ICD-10-CM

## 2021-11-30 DIAGNOSIS — R92.8 ABNORMALITY OF BOTH BREASTS ON SCREENING MAMMOGRAM: Primary | ICD-10-CM

## 2021-12-10 PROBLEM — Z13.1 SCREENING FOR DIABETES MELLITUS: Status: RESOLVED | Noted: 2021-11-10 | Resolved: 2021-12-10

## 2021-12-10 PROBLEM — Z13.820 SCREENING FOR OSTEOPOROSIS: Status: RESOLVED | Noted: 2021-11-10 | Resolved: 2021-12-10

## 2022-01-24 ENCOUNTER — TELEPHONE (OUTPATIENT)
Dept: FAMILY MEDICINE CLINIC | Age: 62
End: 2022-01-24

## 2022-01-24 DIAGNOSIS — M81.6 LOCALIZED OSTEOPOROSIS WITHOUT CURRENT PATHOLOGICAL FRACTURE: Primary | ICD-10-CM

## 2022-01-24 RX ORDER — ALENDRONATE SODIUM 70 MG/1
70 TABLET ORAL
Qty: 4 TABLET | Refills: 5 | Status: SHIPPED | OUTPATIENT
Start: 2022-01-24 | End: 2022-10-13 | Stop reason: SDUPTHER

## 2022-01-24 NOTE — TELEPHONE ENCOUNTER
Spoke with patient and she declines diagnostic mammogram and US. States she has done 2 a year for several years and she is only going to do one a year. Discussed her osteoporosis as well and fosamax 70 mg once a week will be the cheapest with [facility]. RX will be mailed to her home.

## 2022-04-13 DIAGNOSIS — J43.8 OTHER EMPHYSEMA (HCC): ICD-10-CM

## 2022-04-13 PROBLEM — Z87.19: Status: ACTIVE | Noted: 2022-04-13

## 2022-04-13 PROBLEM — Z12.4 ENCOUNTER FOR PAPANICOLAOU SMEAR FOR CERVICAL CANCER SCREENING: Status: ACTIVE | Noted: 2021-11-10

## 2022-04-13 RX ORDER — BUDESONIDE AND FORMOTEROL FUMARATE DIHYDRATE 160; 4.5 UG/1; UG/1
2 AEROSOL RESPIRATORY (INHALATION) 2 TIMES DAILY
Qty: 10.2 G | Refills: 12 | Status: SHIPPED
Start: 2022-04-13 | End: 2022-06-09

## 2022-05-13 PROBLEM — Z12.4 ENCOUNTER FOR PAPANICOLAOU SMEAR FOR CERVICAL CANCER SCREENING: Status: RESOLVED | Noted: 2021-11-10 | Resolved: 2022-05-13

## 2022-06-09 DIAGNOSIS — J43.8 OTHER EMPHYSEMA (HCC): ICD-10-CM

## 2022-06-09 RX ORDER — BUDESONIDE AND FORMOTEROL FUMARATE DIHYDRATE 160; 4.5 UG/1; UG/1
2 AEROSOL RESPIRATORY (INHALATION) 2 TIMES DAILY
Qty: 10.2 G | Refills: 12 | Status: SHIPPED | OUTPATIENT
Start: 2022-06-09

## 2022-06-09 NOTE — TELEPHONE ENCOUNTER
----- Message from April Nagle sent at 6/9/2022 11:40 AM EDT -----  Subject: Refill Request    QUESTIONS  Name of Medication? budesonide-formoterol (SYMBICORT) 160-4.5 MCG/ACT AERO  Patient-reported dosage and instructions? Inhale 2 puffs into the lungs 2   times daily  How many days do you have left? 7  Preferred Pharmacy? [id number] phone number (if available)? [phone]  Additional Information for Provider? [facility] and [facility]. Please call   patient when prescription sent in, thank you  ---------------------------------------------------------------------------  --------------  CALL BACK INFO  What is the best way for the office to contact you? OK to leave message on   voicemail  Preferred Call Back Phone Number? [phone]  ---------------------------------------------------------------------------  --------------  SCRIPT ANSWERS  Relationship to Patient?  Self

## 2022-10-13 ENCOUNTER — OFFICE VISIT (OUTPATIENT)
Dept: FAMILY MEDICINE CLINIC | Age: 62
End: 2022-10-13
Payer: COMMERCIAL

## 2022-10-13 VITALS
BODY MASS INDEX: 28.19 KG/M2 | HEART RATE: 72 BPM | DIASTOLIC BLOOD PRESSURE: 68 MMHG | SYSTOLIC BLOOD PRESSURE: 110 MMHG | WEIGHT: 186 LBS | HEIGHT: 68 IN | TEMPERATURE: 97.1 F | RESPIRATION RATE: 16 BRPM | OXYGEN SATURATION: 95 %

## 2022-10-13 DIAGNOSIS — E78.5 BORDERLINE HYPERLIPIDEMIA: ICD-10-CM

## 2022-10-13 DIAGNOSIS — R73.03 PREDIABETES: ICD-10-CM

## 2022-10-13 DIAGNOSIS — R20.0 NUMBNESS OF LEFT ANTERIOR THIGH: ICD-10-CM

## 2022-10-13 DIAGNOSIS — Z01.419 ENCOUNTER FOR CERVICAL PAP SMEAR WITH PELVIC EXAM: ICD-10-CM

## 2022-10-13 DIAGNOSIS — H69.83 CHRONIC DYSFUNCTION OF BOTH EUSTACHIAN TUBES: ICD-10-CM

## 2022-10-13 DIAGNOSIS — M81.0 AGE-RELATED OSTEOPOROSIS WITHOUT CURRENT PATHOLOGICAL FRACTURE: Primary | ICD-10-CM

## 2022-10-13 DIAGNOSIS — J43.8 OTHER EMPHYSEMA (HCC): ICD-10-CM

## 2022-10-13 PROCEDURE — 99214 OFFICE O/P EST MOD 30 MIN: CPT | Performed by: NURSE PRACTITIONER

## 2022-10-13 RX ORDER — ALENDRONATE SODIUM 70 MG/1
70 TABLET ORAL
Qty: 4 TABLET | Refills: 5 | Status: SHIPPED | OUTPATIENT
Start: 2022-10-13

## 2022-10-13 RX ORDER — AZELASTINE 1 MG/ML
2 SPRAY, METERED NASAL 2 TIMES DAILY
Qty: 1 EACH | Refills: 3 | Status: SHIPPED | OUTPATIENT
Start: 2022-10-13

## 2022-10-13 RX ORDER — FLUTICASONE PROPIONATE 50 MCG
2 SPRAY, SUSPENSION (ML) NASAL DAILY
Qty: 1 EACH | Refills: 3 | Status: SHIPPED | OUTPATIENT
Start: 2022-10-13

## 2022-10-13 SDOH — ECONOMIC STABILITY: FOOD INSECURITY: WITHIN THE PAST 12 MONTHS, THE FOOD YOU BOUGHT JUST DIDN'T LAST AND YOU DIDN'T HAVE MONEY TO GET MORE.: NEVER TRUE

## 2022-10-13 SDOH — ECONOMIC STABILITY: INCOME INSECURITY: IN THE LAST 12 MONTHS, WAS THERE A TIME WHEN YOU WERE NOT ABLE TO PAY THE MORTGAGE OR RENT ON TIME?: NO

## 2022-10-13 SDOH — ECONOMIC STABILITY: FOOD INSECURITY: WITHIN THE PAST 12 MONTHS, YOU WORRIED THAT YOUR FOOD WOULD RUN OUT BEFORE YOU GOT MONEY TO BUY MORE.: NEVER TRUE

## 2022-10-13 SDOH — ECONOMIC STABILITY: TRANSPORTATION INSECURITY
IN THE PAST 12 MONTHS, HAS THE LACK OF TRANSPORTATION KEPT YOU FROM MEDICAL APPOINTMENTS OR FROM GETTING MEDICATIONS?: NO

## 2022-10-13 SDOH — ECONOMIC STABILITY: TRANSPORTATION INSECURITY
IN THE PAST 12 MONTHS, HAS LACK OF TRANSPORTATION KEPT YOU FROM MEETINGS, WORK, OR FROM GETTING THINGS NEEDED FOR DAILY LIVING?: NO

## 2022-10-13 SDOH — ECONOMIC STABILITY: HOUSING INSECURITY
IN THE LAST 12 MONTHS, WAS THERE A TIME WHEN YOU DID NOT HAVE A STEADY PLACE TO SLEEP OR SLEPT IN A SHELTER (INCLUDING NOW)?: NO

## 2022-10-13 ASSESSMENT — ENCOUNTER SYMPTOMS
CHEST TIGHTNESS: 0
COLOR CHANGE: 0
BACK PAIN: 0
CONSTIPATION: 0
SORE THROAT: 0
ABDOMINAL PAIN: 0
FACIAL SWELLING: 0
SINUS PAIN: 0
NAUSEA: 0
COUGH: 0
SHORTNESS OF BREATH: 0
TROUBLE SWALLOWING: 0
SINUS PRESSURE: 0
RHINORRHEA: 0
WHEEZING: 0
VOICE CHANGE: 0
VOMITING: 0
DIARRHEA: 0

## 2022-10-13 ASSESSMENT — SOCIAL DETERMINANTS OF HEALTH (SDOH): HOW HARD IS IT FOR YOU TO PAY FOR THE VERY BASICS LIKE FOOD, HOUSING, MEDICAL CARE, AND HEATING?: NOT HARD AT ALL

## 2022-10-13 NOTE — PROGRESS NOTES
OFFICE PROGRESS NOTE  [address]  [address] [place] [address]  Dept: [phone]   Chief Complaint   Patient presents with    Osteoporosis    Health Maintenance     Due for pneu, pap(would like referral to [facility]), colonoscopy, shingles, declined flu(will go to pharmacy)    Numbness     In left thigh,        ASSESSMENT/PLAN   1. Age-related osteoporosis without current pathological fracture  Assessment & Plan:   Stable resume the fosamax and if not tolerated can change to different medication. Get enough calcium and vitamin D. The [employer] recommends adults younger than age 46 need 1,000 mg of calcium and 600 IU of vitamin D each day. Women ages 46 to 79 need 1,200 mg of calcium and 600 IU of vitamin D each day. Men ages 46 to 79 need 1,000 mg of calcium and 600 IU of vitamin D each day. Adults 71 and older need 1,200 mg of calcium and 800 IU of vitamin D each day. Eat foods rich in calcium, like yogurt, cheese, milk, and dark green vegetables. This is a good way to get the calcium you need. You can get vitamin D from eggs, fatty fish, cereal, and milk. Talk to your doctor about taking a calcium plus vitamin D supplement. Be careful, though. Adults ages 23 to 48 should not get more than 2,500 mg of calcium and 4,000 IU of vitamin D each day, whether it is from supplements and/or food. Adults ages 46 and older should not get more than 2,000 mg of calcium and 4,000 IU of vitamin D each day from supplements and/or food. Limit alcohol to 2 drinks a day for men and 1 drink a day for women. Too much alcohol can cause health problems. Do not smoke. Smoking puts you at a much higher risk for osteoporosis. If you need help quitting, talk to your doctor about stop-smoking programs and medicines. These can increase your chances of quitting for good. Get regular bone-building exercise.  Weight-bearing and resistance exercises keep bones healthy by working the muscles and bones against gravity. Start out at an exercise level that feels right for you. Add a little at a time until you can do the following:  Do 30 minutes of weight-bearing exercise on most days of the week. Walking, jogging, stair climbing, and dancing are good choices. Do resistance exercises with weights or elastic bands 2 to 3 days a week. Reduce your risk of falls:  Wear supportive shoes with low heels and nonslip soles. Use a cane or walker, if you need it. Use shower chairs and bath benches. Put in handrails on stairways, around your shower or tub area, and near the toilet. Keep stairs, porches, and walkways well lit. Use night-lights. Remove throw rugs and other objects that are in the way. Avoid icy, wet, or slippery surfaces. Keep a cordless phone and a flashlight with new batteries by your bed. Orders:  -     alendronate (FOSAMAX) 70 MG tablet; Take 1 tablet by mouth every 7 days Take on empty stomach with full glass of water and stay upright for 1 hour, Disp-4 tablet, R-5Print  2. Numbness of left anterior thigh  Assessment & Plan:   New problem will refer to Dr Lennette Primrose for EMG, will try to get x ray results from DR Ching Smith. Orders:  -     Ambulatory referral to [address]  3. Other emphysema ([facility] [address]  Assessment & Plan:   Borderline controlled continue Symbicort 2 puffs twice a day. Referral to pulmonary [place]. Declines flu vaccine today  Orders:  -     CBC with Auto Differential; Future  -     Comprehensive Metabolic Panel; Future  -     Amb External Referral To Internal Medicine  4. Prediabetes  -     CBC with Auto Differential; Future  -     Comprehensive Metabolic Panel; Future  -     Hemoglobin A1C; Future  5. Encounter for cervical Pap smear with pelvic exam  -     Amb External Referral To Gynecology  6. Borderline hyperlipidemia  -     Lipid Panel; Future  7.  Chronic dysfunction of both eustachian tubes  Assessment & Plan:   Continue the SWI, flonase and astelin as directed. Orders:  -     fluticasone (FLONASE) 50 MCG/ACT nasal spray; 2 sprays by Nasal route daily Use in both nostrils. , Disp-1 each, R-3Print  -     azelastine (ASTELIN) 0.1 % nasal spray; 2 sprays by Nasal route 2 times daily Use in each nostril as directed, Disp-1 each, R-3Print     Reviewed radiology bone density    Discussed smoking cessation, Discussed exercising 30 minutes daily, and Discussed taking medications as directed and adverse effects    Return in about 6 months (around 4/13/2023) for Annual exam.     HPI:   Here today for follow up on osteoporosis she stopped taking the alendronate but can't remember why. She is complaining of chronic back pain has been seeing chiropractor for several months, she has been walking and trying to get 40,000 steps a week. She has has started getting numbness in the left thigh over the last 2 months. She is only getting about 1 1/2 miles a day now. She does have pain in the lower lumbar usually on the left side but hasn't had that pain for a while. When she stand or walks will get a burning in her left thigh and after she sits down and rubs the thigh it goes away. Chiropractor DR Randy Marina did x rays a month or so ago will see about getting a report. She has some intermittent SOB. On symbicort she has history of living [place] a farm will have pulmonary evaluate emphysema. Declines flu vaccine today. Due for fasting labs. Current Outpatient Medications:     alendronate (FOSAMAX) 70 MG tablet, Take 1 tablet by mouth every 7 days Take on empty stomach with full glass of water and stay upright for 1 hour, Disp: 4 tablet, Rfl: 5    fluticasone (FLONASE) 50 MCG/ACT nasal spray, 2 sprays by Nasal route daily Use in both nostrils. , Disp: 1 each, Rfl: 3    azelastine (ASTELIN) 0.1 % nasal spray, 2 sprays by Nasal route 2 times daily Use in each nostril as directed, Disp: 1 each, Rfl: 3    budesonide-formoterol (SYMBICORT) 160-4.5 MCG/ACT AERO, Inhale 2 puffs into the lungs 2 times daily, Disp: 10.2 g, Rfl: 12    Cholecalciferol (VITAMIN D3) 003288 UNIT/GM POWD, by Does not apply route once a week, Disp: , Rfl:     Probiotic Product (CULTURELLE IMMUNE DEFENSE PO), Take by mouth (Patient not taking: No sig reported), Disp: , Rfl:       Surgical History:  has a past surgical history that includes other surgical history (2013); Cosmetic surgery (1983); Colonoscopy; Abdomen surgery; skin biopsy; other surgical history (Bilateral, 03/11/2021); and myringotomy (Bilateral, 3/11/2021). Social History:  reports that she has been smoking cigarettes. She has a 15.00 pack-year smoking history. She has never used smokeless tobacco. She reports current alcohol use. She reports that she does not use drugs. Family History: family history includes Alcohol Abuse in her brother; Cancer in her brother; Colon Cancer (age of onset: 80) in her paternal grandmother; Heart Attack in her maternal grandmother; Heart Disease in her brother, father, maternal grandmother, and mother; Kidney Disease in her father; Leukemia in her brother. I have reviewed Kenya's allergies, medications, problem list, medical, social and family history and have updated as needed in the electronic medical record    Review of Systems   Constitutional:  Negative for activity change, appetite change, chills, diaphoresis, fatigue, fever and unexpected weight change. HENT:  Negative for congestion, dental problem, drooling, ear discharge, ear pain, facial swelling, hearing loss, mouth sores, nosebleeds, postnasal drip, rhinorrhea, sinus pressure, sinus pain, sneezing, sore throat, tinnitus, trouble swallowing and voice change. Eyes:  Negative for visual disturbance. Respiratory:  Negative for cough, chest tightness, shortness of breath and wheezing. Cardiovascular:  Negative for chest pain, palpitations and leg swelling.    Gastrointestinal:  Negative for abdominal pain, constipation, diarrhea, nausea and vomiting. Endocrine: Negative for cold intolerance, heat intolerance, polydipsia, polyphagia and polyuria. Genitourinary:  Negative for difficulty urinating, frequency and urgency. Musculoskeletal:  Negative for arthralgias, back pain, gait problem, joint swelling, myalgias, neck pain and neck stiffness. Skin:  Negative for color change, pallor, rash and wound. Allergic/Immunologic: Negative for environmental allergies, food allergies and immunocompromised state. Neurological:  Negative for dizziness, tremors, seizures, syncope, facial asymmetry, speech difficulty, weakness, light-headedness, numbness and headaches. Hematological:  Negative for adenopathy. Does not bruise/bleed easily. Psychiatric/Behavioral:  Negative for agitation, behavioral problems, confusion, decreased concentration, dysphoric mood, hallucinations, self-injury, sleep disturbance and suicidal ideas. The patient is not nervous/anxious and is not hyperactive.       OBJECTIVE:     VS:  Wt Readings from Last 3 Encounters:   10/13/22 186 lb (84.4 kg)   04/13/22 170 lb 14.4 oz (77.5 kg)   11/23/21 179 lb (81.2 kg)                       Vitals:    10/13/22 1036   BP: 110/68   Pulse: 72   Resp: 16   Temp: 97.1 °F (36.2 °C)   SpO2: 95%   Weight: 186 lb (84.4 kg)   Height: 5' 8\" (1.727 m)       General: Alert and oriented to person, place, and time, well developed and well nourished, in no acute distress  SKIN: Warm and dry, intact without any rash, masses or lesions  HEAD: normocephalic, atraumatic  Eyes: sclera/conjunctiva clear, PERRLA, EOMI's intact  ENT: tympanic membranes, external ear and ear canal normal bilaterally, normal hearing, Nose without deformity, nasal mucosa and turbinates normal without polyps   Throat: clear, tongue midline, clear drainage, no masses or lesions noted, good dentition  Neck: supple and non-tender without mass, trachea midline, no cervical lymphadenopathy, no bruit, no thyromegaly or nodules  Cardiovascular: regular rate and regular rhythm, normal S1 and S2,  no murmurs, rubs, clicks, or gallop. Distal pulses intact, no carotid bruits. No edema  Pulmonary/Chest: clear to auscultation bilaterally, no wheezes, rales or rhonchi, normal air movement, no respiratory distress  Abdomen: soft, non-tender, non-distended, normal bowel sounds, no masses or hepatosplenomegaly  Musculoskeletal: Normal ROM, no joint swelling, deformity or tenderness   Neurologic: reflexes normal and symmetric, no cranial nerve deficit, gait, coordination and speech normal  Extremities: no clubbing, cyanosis, or edema. Psychiatric: Good eye contact, normal mood and affect, answers questions appropriately    I have reviewed my findings and recommendations with Cesar Garrett.     Lauren Lopez, BERYL - CNP, NP-C, FNP-BC

## 2022-10-14 NOTE — ASSESSMENT & PLAN NOTE
Borderline controlled continue Symbicort 2 puffs twice a day. Referral to pulmonary with [place] farm.    Declines flu vaccine today
Continue the SWI, flonase and astelin as directed.
New problem will refer to Dr Gabbi Quezada for EMG, will try to get x ray results from DR Monika Vila.
a cane or walker, if you need it. Use shower chairs and bath benches. Put in handrails on stairways, around your shower or tub area, and near the toilet. ? Keep stairs, porches, and walkways well lit. Use night-lights. ? Remove throw rugs and other objects that are in the way. ? Avoid icy, wet, or slippery surfaces. ? Keep a cordless phone and a flashlight with new batteries by your bed.

## 2022-10-24 ENCOUNTER — OFFICE VISIT (OUTPATIENT)
Dept: PHYSICAL MEDICINE AND REHAB | Age: 62
End: 2022-10-24
Payer: COMMERCIAL

## 2022-10-24 VITALS — BODY MASS INDEX: 27.43 KG/M2 | WEIGHT: 181 LBS | HEIGHT: 68 IN

## 2022-10-24 DIAGNOSIS — M79.605 PAIN OF LEFT LOWER EXTREMITY: Primary | ICD-10-CM

## 2022-10-24 PROCEDURE — 95886 MUSC TEST DONE W/N TEST COMP: CPT | Performed by: PHYSICAL MEDICINE & REHABILITATION

## 2022-10-24 PROCEDURE — 95909 NRV CNDJ TST 5-6 STUDIES: CPT | Performed by: PHYSICAL MEDICINE & REHABILITATION

## 2022-10-24 PROCEDURE — 99243 OFF/OP CNSLTJ NEW/EST LOW 30: CPT | Performed by: PHYSICAL MEDICINE & REHABILITATION

## 2022-10-24 NOTE — PROGRESS NOTES
[address] [facility]  Electrodiagnostic Laboratory  *Accredited by the [address] [place] [address] with exemplary status  [address] [facility] [place]  Phone: [phone]  Fax: [phone]      Date of Examination: 10/24/22  Patient Name: Maximiliano Bone  is a 63y.o. year old female who was seen today regarding   Chief Complaint   Patient presents with    Extremity Pain     Low back pain is present. Numbness     Numbness/burning feeling in the left top of thigh. 2+ months of symp. Extremity Weakness     none   . The symptoms started after no injury. The symptoms are constant. Previous workup has included: xray. Past Medical History:   Diagnosis Date    Allergic rhinitis     Allergic sinusitis 8/4/2021    Borderline hyperlipidemia 10/13/2022    Cancer ([facility] [address] dx 2013    melanoma left hip (had excision, chemo but no radiation)    COPD (chronic obstructive pulmonary disease) ([facility])     states cant afford her symbicort inhaler    Depression     Dysuria 8/4/2021    Emphysema of lung ([facility])     Hx of blood clots     DVT rt leg while going thru chemo (was inactive)    MRSA (methicillin resistant Staphylococcus aureus) infection 6/28/2021    Tinea versicolor 8/4/2021    Ulcerative colitis ([facility] [address]     Urinary tract infection with hematuria 8/4/2021       Past Surgical History:   Procedure Laterality Date    ABDOMEN SURGERY      multiple d/t severe ulcerative colitis  rectum & lge intestine has been removed . only has 4 feet of small intestine left. ...has an internal BCIR([place] continent internal resevoir)was unable to tolerate external d/t severe ahesive allergy.  has to self catherize her bowel every couple of hrs    COLONOSCOPY      COSMETIC SURGERY  1983    upper jaw (has 4 wires holding her jaw up)    MYRINGOTOMY Bilateral 3/11/2021    BILATERAL MYRINGOTOMY  WITH TUBE, BILATERAL EUSTACHIAN TUBE BALLOON DILATION (CPT N1581489, [id number]) performed by Pam Salcedo DO at [facility] OR    OTHER SURGICAL HISTORY  2013    excision melanoma left hip    OTHER SURGICAL HISTORY Bilateral 03/11/2021    myringotomy with tube bilateral eustatian tube baloon dilation    SKIN BIOPSY         There is not family history of neuromuscular conditions. ROS: There has been no associated vision change, hearing change, speech abnormality, swallowing abnormality, or bowel or bladder dysfunction. Physical Exam: General: The patient is in no apparent distress. Height 5' 8\" (1.727 m), weight 181 lb (82.1 kg). MSK: There is no joint effusion, deformity, instability, swelling, erythema or warmth. AROM is full in the spine and extremities. SLR is positive on the left. Neurologic:  No focal sensorimotor deficit. Reflexes 2+ and symmetric. Gait is normal.    Impression:     1. Pain of left lower extremity        Plan:   EMG is indicated to evaluate the above diagnosis. Orders Placed This Encounter   Procedures    CT NEEDLE EMG EA EXTREMTY W/PARASPINL AREA COMPLETE    CT MOTOR &/SENS 5-6 NRV CNDJ PRECONF ELTRODE LIMB     EMG was done today and showed a left L5 radiculopathy. The patient was educated about the diagnosis and the prognosis. Recommend lumbar MRI for correlation. PT and consider NANI. Advised patient to follow up with referring provider. Thank you for allowing me to participate in the care of your patient.       Sincerely,     Jesus Alberto Schmid, DO

## 2022-10-24 NOTE — PATIENT INSTRUCTIONS
Electrodiagnotic Laboratory  Accredited by the [facility] with Exemplary status  GILA West D.O. [facility]  [address] [facility] [place]  Phone: [phone]  Fax: [phone]        Today you had an electrodiagnostic exam which included nerve conduction studies (NCS) and electromyography (EMG). This test evaluated the electrical activity of your nerves and muscles to help determine if you have a nerve or muscle disease. This test can help determine the location and type of a nerve or muscle problem. This will help your referring doctor diagnose your condition and determine the appropriate next step in your treatment plan. After your test:    1. There are no long lasting side effects of the test.     2. You may resume your normal activities without restrictions. 3.  Resume any medications that were stopped for the test.     4  If you have sore areas or bruising in your muscles where the needle was placed, apply a cold pack to the sore area for 15-20 minutes three to four times a day as needed for pain. The soreness should go away in about 1-2 days. 5. Your results were provided  Briefly at the end of your test and the final detailed report will be provided to your referring physician, and/or primary care physician and any other parties you requested within 1-2 days of the examination. You may wish to contact your referring provider after a few days to determine what they would like you to do next. 6.  Please call [phone] with any questions or concerns and if you develop increased body temperature/fever, swelling, tenderness, increased pain and/or drainage from the sites where the needle was placed. Thank you for choosing us for your health care needs.

## 2022-10-24 NOTE — PROGRESS NOTES
[address] [facility]  Electrodiagnostic Laboratory  *Accredited by the [address] [place] [address] with exemplary status  [address] [facility] [place]  Phone: [phone]  Fax: [phone]    Referring Provider: BERYL Graf - *  Primary Care Physician: BERYL Parker - CNP  Patient Name: Cathy Samson  Patient YOB: 1960  Gender: female  BMI: Body mass index is 27.52 kg/m². Height 5' 8\" (1.727 m), weight 181 lb (82.1 kg). 10/24/2022    Reason for Referral: Numbness of thigh    Description of clinical problem:   Chief Complaint   Patient presents with    Extremity Pain     Low back pain is present. Numbness     Numbness/burning feeling in the left top of thigh. 2+ months of symp. Extremity Weakness     none       Sensory NCS      Nerve / Sites Rec. Site Peak Lat PP Amp Segments Distance Velocity Temp. ms µV  cm m/s °C   L Sural - Ankle (Calf)      Calf Ankle 3.70 7.3 Calf - Ankle 14 47 31.8   L Superficial peroneal - Ankle      Lat leg Ankle 3.02 6.9 Lat leg - Ankle 10 38 31.8       Motor NCS      Nerve / Sites Muscle Onset Amplitude Segments Distance Velocity Temp.     ms mV  cm m/s °C   L Peroneal - EDB      Ankle EDB 4.53 6.3 Ankle - EDB 8  31.8      Fib head EDB 11.41 5.5 Fib head - Ankle 32 47 31.8      Above Knee EDB 13.49 5.0 Above Knee - Fib head 10 48 31.8   L Tibial - AH      Ankle AH 3.80 10.3 Ankle - AH 8  31.8      Pop fossa AH 12.76 8.7 Pop fossa - Ankle 45 50 31.7       F Wave      Nerve Fmin % F    ms %   L Peroneal - EDB 49.95 50   L Tibial - AH 55.94 100       H Reflex      Nerve H Lat    ms   L Tibial - Soleus 33.80   R Tibial - Soleus 32.60       EMG      EMG Summary Table     Spontaneous MUAP Recruitment   Muscle Nerve Roots IA Fib PSW Fasc Amp Dur. PPP Pattern   L. Vastus medialis Femoral L2-L4 N None None None N N N N   L. Tibialis anterior Deep peroneal (Fibular) L4-L5 N 1+ 1+ None N N N N   L.  Gastrocnemius (Medial head) Tibial S1-S2 N None None None N N N N   L. Extensor hallucis longus Deep peroneal (Fibular) L5-S1 N None None None N N N Reduced   L. Lumbar paraspinals (low)  - N None None None N N N N   L. Lumbar paraspinals (mid)  - 1+ None None None N N N N        Study Limitations:  none    Summary of Findings:   Nerve conduction studies:   All nerve conduction studies, as listed in the table were normal in latency, amplitude and conduction velocity. Needle EMG:   Needle EMG was performed using a concentric needle. The following abnormalities were seen on needle EMG:  reduced motor unit recruitment in the left extensor hallucis longus, positive sharp waves and fibrillation potentials and complex repetitive discharges in the lower lumbar paraspinals. Otherwise, observed motor units were normal in amplitude, duration, phases and recruitment and no active denervation signs were seen. Diagnostic Interpretation: This study was abnormal.     Electrodiagnosis: There is electrodiagnostic evidence of a lumbar radiculopathy. Location: left L5. Nature: Eleuterio.Toño  ] Axonal   [  ] Demyelinating  [  ] Mixed axonal and demyelinating     [  ] Sensory [ X ] Motor               [  ] Mixed sensorimotor     [ X ] with active denervation       [  ] without active denervation  Duration: Chronic  Severity: moderate  Prognosis: Poor    Previous Study: There is not a prior study for comparison. Follow up EMG is recommended if clinically warranted. Technologist: Alton Camacho  Physician:    Radha Troy D.O., P.T. Board Certified Physical Medicine and Rehabilitation  Board Certified Electrodiagnostic Medicine      Nerve conduction studies and electromyography were performed according to our laboratory policies and procedures which can be provided upon request. All abnormal values are identified in the table.  Laboratory normal values can also be provided upon request.       Cc: Holly Anaya, [address], APRN - CNP

## 2022-10-28 ENCOUNTER — TELEPHONE (OUTPATIENT)
Dept: FAMILY MEDICINE CLINIC | Age: 62
End: 2022-10-28

## 2022-10-28 DIAGNOSIS — R94.131 ABNORMAL EMG: ICD-10-CM

## 2022-10-28 DIAGNOSIS — R20.0 NUMBNESS OF LEFT ANTERIOR THIGH: Primary | ICD-10-CM

## 2022-10-28 NOTE — TELEPHONE ENCOUNTER
----- Message from BERYL Guerra CNP sent at 10/28/2022  8:47 AM EDT -----  Can you call this patient? I sent [employer] message 2 days ago and so far no response. EMG abnormal recommending MRI lumbar is she willing to have done?      ----- Message -----  From: Roly Haider  Sent: 10/26/2022  10:52 AM EDT  To: BERYL Guerra CNP    Please see the EMG results for your review.

## 2022-11-12 PROBLEM — Z01.419 ENCOUNTER FOR CERVICAL PAP SMEAR WITH PELVIC EXAM: Status: RESOLVED | Noted: 2021-11-10 | Resolved: 2022-11-12

## 2022-11-15 ENCOUNTER — TELEPHONE (OUTPATIENT)
Dept: FAMILY MEDICINE CLINIC | Age: 62
End: 2022-11-15

## 2022-11-15 DIAGNOSIS — M51.9 LUMBAR DISC LESION: Primary | ICD-10-CM

## 2022-11-15 DIAGNOSIS — J43.8 OTHER EMPHYSEMA (HCC): ICD-10-CM

## 2022-11-15 DIAGNOSIS — R73.03 PREDIABETES: ICD-10-CM

## 2022-11-15 DIAGNOSIS — E78.5 BORDERLINE HYPERLIPIDEMIA: ICD-10-CM

## 2022-11-15 LAB
ALBUMIN SERPL-MCNC: 3.8 G/DL (ref 3.5–5.2)
ALP BLD-CCNC: 86 U/L (ref 35–104)
ALT SERPL-CCNC: 12 U/L (ref 0–32)
ANION GAP SERPL CALCULATED.3IONS-SCNC: 11 MMOL/L (ref 7–16)
AST SERPL-CCNC: 21 U/L (ref 0–31)
BASOPHILS ABSOLUTE: 0.06 E9/L (ref 0–0.2)
BASOPHILS RELATIVE PERCENT: 0.8 % (ref 0–2)
BILIRUB SERPL-MCNC: 0.4 MG/DL (ref 0–1.2)
BUN BLDV-MCNC: 13 MG/DL (ref 6–23)
CALCIUM SERPL-MCNC: 9.1 MG/DL (ref 8.6–10.2)
CHLORIDE BLD-SCNC: 106 MMOL/L (ref 98–107)
CHOLESTEROL, TOTAL: 234 MG/DL (ref 0–199)
CO2: 23 MMOL/L (ref 22–29)
CREAT SERPL-MCNC: 0.6 MG/DL (ref 0.5–1)
EOSINOPHILS ABSOLUTE: 0.15 E9/L (ref 0.05–0.5)
EOSINOPHILS RELATIVE PERCENT: 1.9 % (ref 0–6)
GFR SERPL CREATININE-BSD FRML MDRD: >60 ML/MIN/1.73
GLUCOSE BLD-MCNC: 97 MG/DL (ref 74–99)
HBA1C MFR BLD: 5.5 % (ref 4–5.6)
HCT VFR BLD CALC: 43.2 % (ref 34–48)
HDLC SERPL-MCNC: 104 MG/DL
HEMOGLOBIN: 14.2 G/DL (ref 11.5–15.5)
IMMATURE GRANULOCYTES #: 0.01 E9/L
IMMATURE GRANULOCYTES %: 0.1 % (ref 0–5)
LDL CHOLESTEROL CALCULATED: 104 MG/DL (ref 0–99)
LYMPHOCYTES ABSOLUTE: 1.37 E9/L (ref 1.5–4)
LYMPHOCYTES RELATIVE PERCENT: 17.7 % (ref 20–42)
MCH RBC QN AUTO: 33.7 PG (ref 26–35)
MCHC RBC AUTO-ENTMCNC: 32.9 % (ref 32–34.5)
MCV RBC AUTO: 102.6 FL (ref 80–99.9)
MONOCYTES ABSOLUTE: 0.58 E9/L (ref 0.1–0.95)
MONOCYTES RELATIVE PERCENT: 7.5 % (ref 2–12)
NEUTROPHILS ABSOLUTE: 5.58 E9/L (ref 1.8–7.3)
NEUTROPHILS RELATIVE PERCENT: 72 % (ref 43–80)
PDW BLD-RTO: 13.3 FL (ref 11.5–15)
PLATELET # BLD: 241 E9/L (ref 130–450)
PMV BLD AUTO: 10.1 FL (ref 7–12)
POTASSIUM SERPL-SCNC: 4.5 MMOL/L (ref 3.5–5)
RBC # BLD: 4.21 E12/L (ref 3.5–5.5)
SODIUM BLD-SCNC: 140 MMOL/L (ref 132–146)
TOTAL PROTEIN: 6.6 G/DL (ref 6.4–8.3)
TRIGL SERPL-MCNC: 129 MG/DL (ref 0–149)
VLDLC SERPL CALC-MCNC: 26 MG/DL
WBC # BLD: 7.8 E9/L (ref 4.5–11.5)

## 2022-11-15 RX ORDER — PREDNISONE 50 MG/1
50 TABLET ORAL EVERY 6 HOURS
Qty: 4 TABLET | Refills: 0 | Status: SHIPPED | OUTPATIENT
Start: 2022-11-15 | End: 2022-11-16

## 2022-11-15 NOTE — TELEPHONE ENCOUNTER
Called patient with MRI results and she is agreeable to the CT scan and bone scan, lesion in L1. Discussed prednisone premedicate first dose 13 hours before testing, then in in am, noon, bedtime.

## 2022-12-15 ENCOUNTER — HOSPITAL ENCOUNTER (OUTPATIENT)
Dept: NUCLEAR MEDICINE | Age: 62
Discharge: HOME | End: 2022-12-15
Payer: COMMERCIAL

## 2022-12-15 ENCOUNTER — HOSPITAL ENCOUNTER (OUTPATIENT)
Dept: CT IMAGING | Age: 62
Discharge: HOME | End: 2022-12-15
Payer: COMMERCIAL

## 2022-12-15 DIAGNOSIS — M81.0 AGE-RELATED OSTEOPOROSIS WITHOUT CURRENT PATHOLOGICAL FRACTURE: ICD-10-CM

## 2022-12-15 DIAGNOSIS — H69.83 CHRONIC DYSFUNCTION OF BOTH EUSTACHIAN TUBES: ICD-10-CM

## 2022-12-15 DIAGNOSIS — M51.9 LUMBAR DISC LESION: ICD-10-CM

## 2022-12-15 DIAGNOSIS — N28.89 MASS OF LEFT KIDNEY: Primary | ICD-10-CM

## 2022-12-15 DIAGNOSIS — J43.8 OTHER EMPHYSEMA (HCC): ICD-10-CM

## 2022-12-15 PROCEDURE — A9503 TC99M MEDRONATE: HCPCS | Performed by: RADIOLOGY

## 2022-12-15 PROCEDURE — 78803 RP LOCLZJ TUM SPECT 1 AREA: CPT

## 2022-12-15 PROCEDURE — 3430000000 HC RX DIAGNOSTIC RADIOPHARMACEUTICAL: Performed by: RADIOLOGY

## 2022-12-15 PROCEDURE — 72132 CT LUMBAR SPINE W/DYE: CPT

## 2022-12-15 PROCEDURE — 78306 BONE IMAGING WHOLE BODY: CPT | Performed by: NURSE PRACTITIONER

## 2022-12-15 PROCEDURE — 6360000004 HC RX CONTRAST MEDICATION: Performed by: RADIOLOGY

## 2022-12-15 RX ORDER — BUDESONIDE AND FORMOTEROL FUMARATE DIHYDRATE 160; 4.5 UG/1; UG/1
2 AEROSOL RESPIRATORY (INHALATION) 2 TIMES DAILY
Qty: 10.2 G | Refills: 12 | Status: SHIPPED | OUTPATIENT
Start: 2022-12-15

## 2022-12-15 RX ORDER — ALENDRONATE SODIUM 70 MG/1
70 TABLET ORAL
Qty: 4 TABLET | Refills: 5 | Status: SHIPPED | OUTPATIENT
Start: 2022-12-15

## 2022-12-15 RX ORDER — FLUTICASONE PROPIONATE 50 MCG
2 SPRAY, SUSPENSION (ML) NASAL DAILY
Qty: 1 EACH | Refills: 3 | Status: SHIPPED | OUTPATIENT
Start: 2022-12-15

## 2022-12-15 RX ORDER — TC 99M MEDRONATE 20 MG/10ML
25 INJECTION, POWDER, LYOPHILIZED, FOR SOLUTION INTRAVENOUS
Status: COMPLETED | OUTPATIENT
Start: 2022-12-15 | End: 2022-12-15

## 2022-12-15 RX ORDER — AZELASTINE 1 MG/ML
2 SPRAY, METERED NASAL 2 TIMES DAILY
Qty: 1 EACH | Refills: 3 | Status: SHIPPED | OUTPATIENT
Start: 2022-12-15

## 2022-12-15 RX ADMIN — TC 99M MEDRONATE 25 MILLICURIE: 20 INJECTION, POWDER, LYOPHILIZED, FOR SOLUTION INTRAVENOUS at 11:00

## 2022-12-15 RX ADMIN — IOPAMIDOL 75 ML: 755 INJECTION, SOLUTION INTRAVENOUS at 11:26

## 2022-12-15 NOTE — PROGRESS NOTES
Spoke with patient regarding CT and bone scan results. Exophytic mass on left kidney will need US. Results for Dr Jazmin Joy to review and possible NANI. Discussed to come to the office tomorrow and see if they can do US tomorrow.

## 2022-12-20 DIAGNOSIS — R20.0 NUMBNESS OF LEFT ANTERIOR THIGH: Primary | ICD-10-CM

## 2022-12-23 ENCOUNTER — TELEPHONE (OUTPATIENT)
Dept: FAMILY MEDICINE CLINIC | Age: 62
End: 2022-12-23

## 2022-12-23 DIAGNOSIS — M89.9 RIB LESION: Primary | ICD-10-CM

## 2022-12-23 NOTE — TELEPHONE ENCOUNTER
Call placed to patient and she states she isn't having any pain in the leg now but is also not outside doing her normal activities due to it being winter as she has always had problems falling since the age of 12, admits to falling a lot as a kid and does have history of fractured back but can't remember where. no rib pain, unknown rib injury Agrees to rib x ray will get done on Tuesday if the weather is good. Just to keep you in the loop.

## 2023-02-07 ENCOUNTER — TELEPHONE (OUTPATIENT)
Dept: CASE MANAGEMENT | Age: 63
End: 2023-02-07

## 2023-02-07 NOTE — TELEPHONE ENCOUNTER
I called the patient and she confirmed her CT lung screening at [facility] on 2/8/2023 at 9:00 am.  I reminded the patient to arrive at 8:30 am, enter through the main entrance, and register. Patient confirmed.           Electronically signed by Marques Garces on 2/7/23 at 2:59 PM EST

## 2023-02-08 ENCOUNTER — HOSPITAL ENCOUNTER (OUTPATIENT)
Dept: CT IMAGING | Age: 63
Discharge: HOME | End: 2023-02-08
Payer: COMMERCIAL

## 2023-02-08 DIAGNOSIS — F17.210 CIGARETTE SMOKER: ICD-10-CM

## 2023-02-08 DIAGNOSIS — Z87.891 PERSONAL HISTORY OF TOBACCO USE: ICD-10-CM

## 2023-02-08 PROCEDURE — 71271 CT THORAX LUNG CANCER SCR C-: CPT

## 2023-02-10 ENCOUNTER — TELEPHONE (OUTPATIENT)
Dept: CASE MANAGEMENT | Age: 63
End: 2023-02-10

## 2023-02-10 NOTE — TELEPHONE ENCOUNTER
No call, encounter opened to process CT Lung Screening. CT Lung Screen: 2/8/2023      Impression   1. There is no pulmonary infiltrate, mass or suspicious pulmonary nodule. 2. Mild emphysematous changes       LUNG RADS:   Per ACR Lung-RADS Version 1.1       Category 1, Negative (No nodules and definitely benign nodules). Management: Continue annual lung screening with LDCT in 12 months. RECOMMENDATIONS:   If you would like to register your patient with the [facility], please contact the Nurse Navigator at   [phone]. Pack years: 39    Social History     Tobacco Use  Smoking Status: Current Every Day Smoker    Start Date: 28   Quit Date:    Types: Cigarettes   Packs/Day: 1   Years: 39   Pack Years: 39   Smokeless Tobacco: Never        Results letter sent to patient via my chart or mailed.      [address]

## 2023-03-08 NOTE — PROGRESS NOTES
This patient was referred for audiometric/tympanometric testing by Dr. Norma Lopez due to recurrent ear infections. Patient reported no improvement in symptoms. She reported some drainage in the right ear. Tympanometry revealed flat tympanograms, bilaterally. The results were reviewed with the patient. Recommendations for follow up will be made pending physician consult.       Leo Lee CCC-A  [address] [facility] [id number]  Electronically signed by Leo Lee on 5/11/2021 at 5:06 PM none known

## 2023-04-19 ENCOUNTER — OFFICE VISIT (OUTPATIENT)
Dept: FAMILY MEDICINE CLINIC | Age: 63
End: 2023-04-19
Payer: COMMERCIAL

## 2023-04-19 VITALS
HEIGHT: 68 IN | RESPIRATION RATE: 16 BRPM | OXYGEN SATURATION: 95 % | BODY MASS INDEX: 28.42 KG/M2 | SYSTOLIC BLOOD PRESSURE: 126 MMHG | DIASTOLIC BLOOD PRESSURE: 82 MMHG | TEMPERATURE: 97.1 F | HEART RATE: 83 BPM | WEIGHT: 187.5 LBS

## 2023-04-19 DIAGNOSIS — G89.29 CHRONIC MIDLINE LOW BACK PAIN WITH LEFT-SIDED SCIATICA: ICD-10-CM

## 2023-04-19 DIAGNOSIS — R20.0 NUMBNESS OF LEFT ANTERIOR THIGH: Primary | ICD-10-CM

## 2023-04-19 DIAGNOSIS — M54.42 CHRONIC MIDLINE LOW BACK PAIN WITH LEFT-SIDED SCIATICA: ICD-10-CM

## 2023-04-19 PROBLEM — R06.09 CHRONIC DYSPNEA: Status: ACTIVE | Noted: 2023-01-10

## 2023-04-19 PROBLEM — J44.9 CHRONIC OBSTRUCTIVE LUNG DISEASE (HCC): Status: ACTIVE | Noted: 2023-01-10

## 2023-04-19 PROBLEM — J45.909 ASTHMA WITHOUT STATUS ASTHMATICUS: Status: ACTIVE | Noted: 2023-02-14

## 2023-04-19 PROBLEM — F17.210 CIGARETTE SMOKER: Status: ACTIVE | Noted: 2023-01-10

## 2023-04-19 PROBLEM — R05.3 CHRONIC COUGH: Status: ACTIVE | Noted: 2023-01-10

## 2023-04-19 PROBLEM — R06.89 DIFFICULTY BREATHING: Status: ACTIVE | Noted: 2023-02-14

## 2023-04-19 PROCEDURE — 99213 OFFICE O/P EST LOW 20 MIN: CPT | Performed by: NURSE PRACTITIONER

## 2023-04-19 RX ORDER — ALBUTEROL SULFATE 2.5 MG/3ML
SOLUTION RESPIRATORY (INHALATION)
COMMUNITY
Start: 2023-01-12

## 2023-04-19 ASSESSMENT — ENCOUNTER SYMPTOMS
BACK PAIN: 1
COLOR CHANGE: 0
SINUS PAIN: 0
VOMITING: 0
SHORTNESS OF BREATH: 0
DIARRHEA: 0
CHEST TIGHTNESS: 0
CONSTIPATION: 0
COUGH: 0
RHINORRHEA: 0
WHEEZING: 0
SINUS PRESSURE: 0
VOICE CHANGE: 0
TROUBLE SWALLOWING: 0
ABDOMINAL PAIN: 0
SORE THROAT: 0
NAUSEA: 0
FACIAL SWELLING: 0

## 2023-04-19 ASSESSMENT — PATIENT HEALTH QUESTIONNAIRE - PHQ9
1. LITTLE INTEREST OR PLEASURE IN DOING THINGS: 1
SUM OF ALL RESPONSES TO PHQ9 QUESTIONS 1 & 2: 2
SUM OF ALL RESPONSES TO PHQ QUESTIONS 1-9: 2
2. FEELING DOWN, DEPRESSED OR HOPELESS: 1

## 2023-04-19 NOTE — ASSESSMENT & PLAN NOTE
Gradually worsening referral to pain management  Reviewed imaging today MRI, CT lumbar, bone scan, EMG

## 2023-04-25 ENCOUNTER — OFFICE VISIT (OUTPATIENT)
Dept: PAIN MANAGEMENT | Age: 63
End: 2023-04-25
Payer: COMMERCIAL

## 2023-04-25 VITALS
TEMPERATURE: 97.1 F | OXYGEN SATURATION: 95 % | DIASTOLIC BLOOD PRESSURE: 78 MMHG | SYSTOLIC BLOOD PRESSURE: 122 MMHG | WEIGHT: 187 LBS | HEART RATE: 72 BPM | RESPIRATION RATE: 18 BRPM | BODY MASS INDEX: 28.34 KG/M2 | HEIGHT: 68 IN

## 2023-04-25 DIAGNOSIS — G89.4 CHRONIC PAIN SYNDROME: Primary | ICD-10-CM

## 2023-04-25 DIAGNOSIS — M48.061 FORAMINAL STENOSIS OF LUMBAR REGION: ICD-10-CM

## 2023-04-25 DIAGNOSIS — M47.816 LUMBAR FACET ARTHROPATHY: ICD-10-CM

## 2023-04-25 DIAGNOSIS — M47.816 LUMBAR SPONDYLOSIS: ICD-10-CM

## 2023-04-25 DIAGNOSIS — M51.9 LUMBAR DISC DISORDER: ICD-10-CM

## 2023-04-25 PROCEDURE — 99204 OFFICE O/P NEW MOD 45 MIN: CPT | Performed by: PAIN MEDICINE

## 2023-04-25 RX ORDER — SODIUM CHLORIDE 9 MG/ML
INJECTION, SOLUTION INTRAVENOUS PRN
OUTPATIENT
Start: 2023-04-25

## 2023-04-25 RX ORDER — SODIUM CHLORIDE 0.9 % (FLUSH) 0.9 %
5-40 SYRINGE (ML) INJECTION EVERY 12 HOURS SCHEDULED
OUTPATIENT
Start: 2023-04-25

## 2023-04-25 RX ORDER — SODIUM CHLORIDE 0.9 % (FLUSH) 0.9 %
5-40 SYRINGE (ML) INJECTION PRN
OUTPATIENT
Start: 2023-04-25

## 2023-04-25 NOTE — PROGRESS NOTES
Cathy Samson presents to the [facility] on 4/25/2023. Kendra Granados is complaining of pain in her lower back that radiates to left lower extremities. The pain is intermittent. The pain is described as aching, dull, numb, and Tingling. Pain is rated on her best day at a 1, on her worst day at a 9, and on average at a 5 on the VAS scale. She took her last dose of Motrin and Aleve  4 days. Kendra Granados does not have issues with constipation. Any procedures since your last visit: No,       She is  on NSAIDS and  is not on anticoagulation medications to include none and is managed by NA. Pacemaker or defibrillator: No Physician managing device is NA. Medication Contract and Consent for Opioid Use Documents Filed        No documents found                       There were no vitals taken for this visit. No LMP recorded.  Patient is postmenopausal.
negative             left  Piriformis tenderness:negative right, negative left  Trochanteric bursa tenderness:negative right, negative left  SLR:negative right, negative left, sitting     Extremities:    Tremors:None bilaterally upper and lower  Range of motion:pain with internal rotation of hips negative. Intact:Yes  Edema:Normal    Neurological:    Sensory:normal to light touch bilateral lower extremities. Motor:                           Right Quadriceps4+/5          Left Quadriceps4+/5           Right Gastrocnemius4+/5    Left Gastrocnemius4+/5  Right Ant Tibialis4+/5  Left Ant Tibialis4+/5  Reflexes:    Right Quadriceps reflex2+  Left Quadriceps reflex2+  Right Achilles reflex1+  Left Achilles reflex1+  Gait:normal    Dermatology:    Skin:no unusual rashes and no skin lesions    Impression:  Low back pain with occasional numbness of the right thigh  Lumbar spine MRI Mild degenerative changes with multilevel foraminal stenosis   Plan:  Reviewed lumbar spine MRI and discussed with the patient. Discussed pathology and treatment options. Discussed bilateral L3,4,5 medial branch block, patient agrees. Continue with OTC pain medications. Cancel urine screen. OARRS report reviewed 04/2023. Patient encouraged to stay active and to lose weight. Treatment plan discussed with the patient including medications and procedure side effects. We discussed with the patient that combining opioids, benzodiazepines, alcohol, illicit drugs or sleep aids increases the risk of respiratory depression including death. We discussed that these medications may cause drowsiness, sedation or dizziness and have counseled the patient not to drive or operate machinery. We have discussed that these medications will be prescribed only by one provider. We have discussed with the patient about age related risk factors and have thoroughly discussed the importance of taking these medications as prescribed.  The patient verbalizes

## 2023-05-08 ENCOUNTER — PREP FOR PROCEDURE (OUTPATIENT)
Dept: PAIN MANAGEMENT | Age: 63
End: 2023-05-08

## 2023-05-15 ENCOUNTER — HOSPITAL ENCOUNTER (OUTPATIENT)
Age: 63
Setting detail: OUTPATIENT SURGERY
Discharge: HOME | End: 2023-05-15
Attending: PAIN MEDICINE | Admitting: PAIN MEDICINE
Payer: COMMERCIAL

## 2023-05-15 ENCOUNTER — HOSPITAL ENCOUNTER (OUTPATIENT)
Dept: OPERATING ROOM | Age: 63
Setting detail: OUTPATIENT SURGERY
Discharge: HOME | End: 2023-05-15
Attending: PAIN MEDICINE
Payer: COMMERCIAL

## 2023-05-15 VITALS
HEART RATE: 72 BPM | SYSTOLIC BLOOD PRESSURE: 141 MMHG | RESPIRATION RATE: 16 BRPM | HEIGHT: 68 IN | DIASTOLIC BLOOD PRESSURE: 82 MMHG | OXYGEN SATURATION: 98 % | TEMPERATURE: 98 F | BODY MASS INDEX: 28.34 KG/M2 | WEIGHT: 187 LBS

## 2023-05-15 DIAGNOSIS — M47.896 OTHER OSTEOARTHRITIS OF SPINE, LUMBAR REGION: ICD-10-CM

## 2023-05-15 PROCEDURE — 7100000011 HC PHASE II RECOVERY - ADDTL 15 MIN: Performed by: PAIN MEDICINE

## 2023-05-15 PROCEDURE — 3209999900 FLUORO FOR SURGICAL PROCEDURES

## 2023-05-15 PROCEDURE — 2500000003 HC RX 250 WO HCPCS: Performed by: PAIN MEDICINE

## 2023-05-15 PROCEDURE — 3600000005 HC SURGERY LEVEL 5 BASE: Performed by: PAIN MEDICINE

## 2023-05-15 PROCEDURE — 7100000010 HC PHASE II RECOVERY - FIRST 15 MIN: Performed by: PAIN MEDICINE

## 2023-05-15 PROCEDURE — 2709999900 HC NON-CHARGEABLE SUPPLY: Performed by: PAIN MEDICINE

## 2023-05-15 PROCEDURE — 64494 INJ PARAVERT F JNT L/S 2 LEV: CPT | Performed by: PAIN MEDICINE

## 2023-05-15 PROCEDURE — 6360000002 HC RX W HCPCS: Performed by: PAIN MEDICINE

## 2023-05-15 PROCEDURE — 64493 INJ PARAVERT F JNT L/S 1 LEV: CPT | Performed by: PAIN MEDICINE

## 2023-05-15 RX ORDER — SODIUM CHLORIDE 0.9 % (FLUSH) 0.9 %
5-40 SYRINGE (ML) INJECTION PRN
Status: DISCONTINUED | OUTPATIENT
Start: 2023-05-15 | End: 2023-05-15 | Stop reason: HOSPADM

## 2023-05-15 RX ORDER — SODIUM CHLORIDE 9 MG/ML
INJECTION, SOLUTION INTRAVENOUS PRN
Status: DISCONTINUED | OUTPATIENT
Start: 2023-05-15 | End: 2023-05-15 | Stop reason: HOSPADM

## 2023-05-15 RX ORDER — SODIUM CHLORIDE 0.9 % (FLUSH) 0.9 %
5-40 SYRINGE (ML) INJECTION EVERY 12 HOURS SCHEDULED
Status: DISCONTINUED | OUTPATIENT
Start: 2023-05-15 | End: 2023-05-15 | Stop reason: HOSPADM

## 2023-05-15 RX ORDER — LIDOCAINE HYDROCHLORIDE 5 MG/ML
INJECTION, SOLUTION INFILTRATION; INTRAVENOUS PRN
Status: DISCONTINUED | OUTPATIENT
Start: 2023-05-15 | End: 2023-05-15 | Stop reason: ALTCHOICE

## 2023-05-15 ASSESSMENT — PAIN - FUNCTIONAL ASSESSMENT: PAIN_FUNCTIONAL_ASSESSMENT: 0-10

## 2023-05-15 ASSESSMENT — PAIN SCALES - GENERAL
PAINLEVEL_OUTOF10: 0
PAINLEVEL_OUTOF10: 0

## 2023-05-15 NOTE — H&P
[address]  [address] [facility], [address] [place] [address] Niko  [phone]    Procedure History & Physical      Jabari Olivo     HPI:    Patient  is here for axial low back pain for bilateral L3,4,5 MBB. Labs/imaging studies reviewed   All question and concerns addressed including R/B/A associated with the procedure    Past Medical History:   Diagnosis Date    Allergic rhinitis     Allergic sinusitis 8/4/2021    Asthma without status asthmaticus 2/14/2023    Borderline hyperlipidemia 10/13/2022    Cancer ([address] dx 2013    melanoma left hip (had excision, chemo but no radiation)    Chronic midline low back pain with left-sided sciatica 4/19/2023    COPD (chronic obstructive pulmonary disease) ([facility] [address]     states cant afford her symbicort inhaler    Depression     Dysuria 8/4/2021    Emphysema of lung (HCC)     Hx of blood clots     DVT rt leg while going thru chemo (was inactive)    Lumbar spondylosis 4/25/2023    MRSA (methicillin resistant Staphylococcus aureus) infection 6/28/2021    Tinea versicolor 8/4/2021    Ulcerative colitis ([facility] [address]     Urinary tract infection with hematuria 8/4/2021       Past Surgical History:   Procedure Laterality Date    ABDOMEN SURGERY      multiple d/t severe ulcerative colitis  rectum & lge intestine has been removed . only has 4 feet of small intestine left. ...has an internal BCIR([place] internal resevoir)was unable to tolerate external d/t severe ahesive allergy.  has to self catherize her bowel every couple of hrs    COLONOSCOPY      [address] [facility]    upper jaw (has 4 wires holding her jaw up)    MYRINGOTOMY Bilateral 03/11/2021    BILATERAL MYRINGOTOMY  WITH TUBE, BILATERAL EUSTACHIAN TUBE BALLOON DILATION (CPT [id number], [id number]) performed by Sneha Branch DO at [address] [facility] [address] 2013    excision melanoma left hip    OTHER SURGICAL HISTORY Bilateral 03/11/2021    myringotomy with tube bilateral eustatian tube

## 2023-05-15 NOTE — DISCHARGE INSTRUCTIONS
keep draining. You may need antibiotics. You will need to follow up with your doctor to make sure the infection has gone away. Follow-up care is a key part of your treatment and safety. Be sure to make and go to all appointments, and call your doctor if you are having problems. It's also a good idea to know your test results and keep a list of the medicines you take. How can you care for yourself at home? Make sure your surgeon knows about the infection, especially if you saw another doctor about your symptoms. If your doctor prescribed antibiotics, take them as directed. Do not stop taking them just because you feel better. You need to take the full course of antibiotics. Ask your doctor if you can take an over-the-counter pain medicine, such as acetaminophen (Tylenol), ibuprofen (Advil, Motrin), or naproxen (Aleve). Be safe with medicines. Read and follow all instructions on the label. Do not take two or more pain medicines at the same time unless the doctor told you to. Many pain medicines have acetaminophen, which is Tylenol. Too much acetaminophen (Tylenol) can be harmful. Prop up the area on a pillow anytime you sit or lie down during the next 3 days. Try to keep it above the level of your heart. This will help reduce swelling. Keep the skin clean and dry. You may have a bandage over the cut (incision). A bandage helps the incision heal and protects it. Your doctor will tell you how to take care of this. Keep it clean and dry. You may have drainage from the wound. If your doctor told you how to care for your incision, follow your doctor's instructions. If you did not get instructions, follow this general advice:  Wash around the incision with clean water 2 times a day. Don't use hydrogen peroxide or alcohol, which can slow healing. When should you call for help? Call your doctor now or seek immediate medical care if:    You have signs that your infection is getting worse, such as:   Increased

## 2023-05-15 NOTE — OP NOTE
5/15/2023    Patient: Elisabeth Hernandez  :  1960  Age:  66 y.o. Sex:  female     PRE-OPERATIVE DIAGNOSIS: Bilateral Lumbar spondylosis, lumbar facet syndrome. POST-OPERATIVE DIAGNOSIS: Same. PROCEDURE:  # 1 Fluoroscopic guided lumbar medial branch blocks Bilateral at Levels: L3, L4, L5 MB. SURGEON: GISSELLE Robbins M.D. ANESTHESIA: Local    ESTIMATED BLOOD LOSS: None.  ______________________________________________________________________  BRIEF HISTORY:  Elisabeth Hernandez comes in today for 1 fluoroscopic guided lumbar medial branch blocks  Bilateral  at Levels: L3, L4, L5 MB. The potential complications of this procedure were discussed with her again today. She has elected to undergo the aforementioned procedure. Richard complete History & Physical examination were reviewed in depth, a copy of which is in the chart. DESCRIPTION OF PROCEDURE:   After confirming written and informed consent, a time-out was performed and [facility] name and date of birth, the procedure to be performed as well as the plan for the location of the needle insertion were confirmed. The patient was brought into the procedure room and placed in the prone position on the fluoroscopy table. Standard monitors were placed and vital signs were observed throughout the procedure. The area of the lumbar spine was prepped with chloraprep and draped in a sterile manner. AP fluoroscopy was used to identify and sanchez bartons point at the targeted levels. The skin and subcutaneous tissues in these identified areas were anesthetized with 0.5%Lidocaine. A 22 # gauge 3 1/2 spinal needle was advanced toward the junction of the superior articular process and the transverse process, along the course of the medial branch. Satisfactory needle placement was confirmed by AP and oblique projections.   At the sacral alar level, the sacral alar region was visualized and the needle tip was positioned on the sacral alar at the base of the superior

## 2023-05-24 ENCOUNTER — OFFICE VISIT (OUTPATIENT)
Dept: PAIN MANAGEMENT | Age: 63
End: 2023-05-24
Payer: COMMERCIAL

## 2023-05-24 VITALS
TEMPERATURE: 97.3 F | HEART RATE: 92 BPM | BODY MASS INDEX: 27.98 KG/M2 | SYSTOLIC BLOOD PRESSURE: 110 MMHG | HEIGHT: 68 IN | OXYGEN SATURATION: 98 % | WEIGHT: 184.6 LBS | RESPIRATION RATE: 18 BRPM | DIASTOLIC BLOOD PRESSURE: 62 MMHG

## 2023-05-24 DIAGNOSIS — M51.9 LUMBAR DISC DISORDER: ICD-10-CM

## 2023-05-24 DIAGNOSIS — M47.816 LUMBAR SPONDYLOSIS: ICD-10-CM

## 2023-05-24 DIAGNOSIS — M48.061 FORAMINAL STENOSIS OF LUMBAR REGION: ICD-10-CM

## 2023-05-24 DIAGNOSIS — G89.4 CHRONIC PAIN SYNDROME: Primary | ICD-10-CM

## 2023-05-24 DIAGNOSIS — M47.816 LUMBAR FACET ARTHROPATHY: ICD-10-CM

## 2023-05-24 PROCEDURE — 99213 OFFICE O/P EST LOW 20 MIN: CPT | Performed by: PAIN MEDICINE

## 2023-05-24 NOTE — PROGRESS NOTES
Jaja Lawrence presents to the [facility] on 5/24/2023. Carren Brunner is complaining of pain BACK. The pain is intermittent. The pain is described as aching and dull. Pain is rated on her best day at a 1, on her worst day at a 6, and on average at a 6 on the VAS scale. She took her last dose of  OTC   TODAY. Carren Brunner does not have issues with constipation. Any procedures since your last visit: Yes, with 50 % relief. She is not on NSAIDS and  is not on anticoagulation medications  Pacemaker or defibrillator: No   Medication Contract and Consent for Opioid Use Documents Filed        No documents found                       There were no vitals taken for this visit. No LMP recorded.  Patient is postmenopausal.

## 2023-05-24 NOTE — PROGRESS NOTES
[address]  [address] [facility], [address] [place] [address] Niko  [phone]    Follow up Note      Otis Brizuela     Date of Visit:  5/24/2023    CC:  Patient presents for follow up   Chief Complaint   Patient presents with    Back Pain     HPI:    Pain is better. Appropriate analgesia with current medications regimen:N/A. Change in quality of symptoms:no. Medication side effects:not applicable . Recent diagnostic testing:none. Recent interventional procedures:Bilateral L3,4,5 MBB with more than 75% improvement in her pain for 4 days. She has not been on anticoagulation medications to include none. The patient  has not been on herbal supplements. The patient is not diabetic. Imaging:   Lumbar spine MRI 2022:     1. 2.2 x 1.4 cm focus of signal abnormality in the L1 vertebral body is   nonspecific. It may be of a benign or malignant etiology. A sclerotic   metastasis is may present in this manner. Further evaluation with CT of the   lumbar spine, as well as bone scan, is recommended. 2.  No fracture or joint dislocation. 3. Mild degenerative changes. No significant central canal stenosis. 4.  Multilevel neural foraminal stenoses, worst (mild-to-moderate) at the   left L5-S1 level. 5. Disc bulges at multiple levels, associated with an annular tear at L4-5. EMG  EMG was done today and showed a left L5 radiculopathy     Previous treatments: medications. .       Potential Aberrant Drug-Related Behavior:    No    Urine Drug Screening:  None    OARRS report:  05/2023 consistent     Opioid Agreement:  Renewal date:N/A    Past Medical History:   Diagnosis Date    Allergic rhinitis     Allergic sinusitis 8/4/2021    Asthma without status asthmaticus 2/14/2023    Borderline hyperlipidemia 10/13/2022    Cancer ([facility] [address] dx 2013    melanoma left hip (had excision, chemo but no radiation)    Chronic midline low back pain with left-sided sciatica 4/19/2023    COPD

## 2023-08-10 ENCOUNTER — OFFICE VISIT (OUTPATIENT)
Dept: FAMILY MEDICINE CLINIC | Age: 63
End: 2023-08-10
Payer: COMMERCIAL

## 2023-08-10 VITALS
BODY MASS INDEX: 28.07 KG/M2 | OXYGEN SATURATION: 98 % | HEART RATE: 72 BPM | SYSTOLIC BLOOD PRESSURE: 124 MMHG | RESPIRATION RATE: 16 BRPM | TEMPERATURE: 97.7 F | HEIGHT: 68 IN | DIASTOLIC BLOOD PRESSURE: 82 MMHG

## 2023-08-10 DIAGNOSIS — Z87.891 PERSONAL HISTORY OF TOBACCO USE, PRESENTING HAZARDS TO HEALTH: ICD-10-CM

## 2023-08-10 DIAGNOSIS — Z12.31 SCREENING MAMMOGRAM FOR BREAST CANCER: ICD-10-CM

## 2023-08-10 DIAGNOSIS — M81.0 AGE-RELATED OSTEOPOROSIS WITHOUT CURRENT PATHOLOGICAL FRACTURE: ICD-10-CM

## 2023-08-10 DIAGNOSIS — Z00.00 ENCOUNTER FOR WELL ADULT EXAM WITHOUT ABNORMAL FINDINGS: Primary | ICD-10-CM

## 2023-08-10 DIAGNOSIS — Z28.21 IMMUNIZATION DECLINED: ICD-10-CM

## 2023-08-10 LAB
ABSOLUTE IMMATURE GRANULOCYTE: 0.04 K/UL (ref 0–0.58)
ALBUMIN SERPL-MCNC: 4 G/DL (ref 3.5–5.2)
ALP BLD-CCNC: 74 U/L (ref 35–104)
ALT SERPL-CCNC: 35 U/L (ref 0–32)
ANION GAP SERPL CALCULATED.3IONS-SCNC: 10 MMOL/L (ref 7–16)
AST SERPL-CCNC: 33 U/L (ref 0–31)
BASOPHILS ABSOLUTE: 0.04 K/UL (ref 0–0.2)
BASOPHILS RELATIVE PERCENT: 1 % (ref 0–2)
BILIRUB SERPL-MCNC: 0.3 MG/DL (ref 0–1.2)
BUN BLDV-MCNC: 15 MG/DL (ref 6–23)
CALCIUM SERPL-MCNC: 9 MG/DL (ref 8.6–10.2)
CHLORIDE BLD-SCNC: 102 MMOL/L (ref 98–107)
CO2: 22 MMOL/L (ref 22–29)
CREAT SERPL-MCNC: 0.7 MG/DL (ref 0.5–1)
EOSINOPHILS ABSOLUTE: 0.1 K/UL (ref 0.05–0.5)
EOSINOPHILS RELATIVE PERCENT: 1 % (ref 0–6)
GFR SERPL CREATININE-BSD FRML MDRD: >60 ML/MIN/1.73M2
GLUCOSE BLD-MCNC: 93 MG/DL (ref 74–99)
HCT VFR BLD CALC: 46.8 % (ref 34–48)
HEMOGLOBIN: 14.5 G/DL (ref 11.5–15.5)
IMMATURE GRANULOCYTES: 1 % (ref 0–5)
LYMPHOCYTES ABSOLUTE: 1.11 K/UL (ref 1.5–4)
LYMPHOCYTES RELATIVE PERCENT: 15 % (ref 20–42)
MCH RBC QN AUTO: 32.7 PG (ref 26–35)
MCHC RBC AUTO-ENTMCNC: 31 G/DL (ref 32–34.5)
MCV RBC AUTO: 105.6 FL (ref 80–99.9)
MONOCYTES ABSOLUTE: 0.67 K/UL (ref 0.1–0.95)
MONOCYTES RELATIVE PERCENT: 9 % (ref 2–12)
NEUTROPHILS ABSOLUTE: 5.56 K/UL (ref 1.8–7.3)
NEUTROPHILS RELATIVE PERCENT: 74 % (ref 43–80)
PDW BLD-RTO: 13.1 % (ref 11.5–15)
PLATELET # BLD: 257 K/UL (ref 130–450)
PMV BLD AUTO: 10.1 FL (ref 7–12)
POTASSIUM SERPL-SCNC: 4.3 MMOL/L (ref 3.5–5)
RBC # BLD: 4.43 M/UL (ref 3.5–5.5)
SODIUM BLD-SCNC: 134 MMOL/L (ref 132–146)
TOTAL PROTEIN: 7.1 G/DL (ref 6.4–8.3)
WBC # BLD: 7.5 K/UL (ref 4.5–11.5)

## 2023-08-10 PROCEDURE — 99396 PREV VISIT EST AGE 40-64: CPT | Performed by: NURSE PRACTITIONER

## 2023-08-10 PROCEDURE — 99406 BEHAV CHNG SMOKING 3-10 MIN: CPT | Performed by: NURSE PRACTITIONER

## 2023-08-10 RX ORDER — ALENDRONATE SODIUM 70 MG/1
70 TABLET ORAL
Qty: 4 TABLET | Refills: 5 | Status: SHIPPED | OUTPATIENT
Start: 2023-08-10

## 2023-08-10 ASSESSMENT — ENCOUNTER SYMPTOMS
BACK PAIN: 1
WHEEZING: 0
TROUBLE SWALLOWING: 0
COLOR CHANGE: 0
SINUS PRESSURE: 0
CONSTIPATION: 0
SORE THROAT: 0
VOMITING: 0
FACIAL SWELLING: 0
NAUSEA: 0
SINUS PAIN: 0
SHORTNESS OF BREATH: 0
COUGH: 1
ABDOMINAL PAIN: 0
RHINORRHEA: 0
VOICE CHANGE: 0
CHEST TIGHTNESS: 0
DIARRHEA: 0

## 2023-08-10 NOTE — PROGRESS NOTES
Negative for chest pain, palpitations and leg swelling. Gastrointestinal:  Negative for abdominal pain, constipation, diarrhea, nausea and vomiting. Endocrine: Negative for cold intolerance, heat intolerance, polydipsia, polyphagia and polyuria. Genitourinary:  Negative for difficulty urinating, frequency and urgency. Musculoskeletal:  Positive for arthralgias and back pain. Negative for gait problem, joint swelling, myalgias, neck pain and neck stiffness. Skin:  Negative for color change, pallor, rash and wound. Allergic/Immunologic: Negative for environmental allergies, food allergies and immunocompromised state. Neurological:  Negative for dizziness, tremors, seizures, syncope, facial asymmetry, speech difficulty, weakness, light-headedness, numbness and headaches. Hematological:  Negative for adenopathy. Does not bruise/bleed easily. Psychiatric/Behavioral:  Negative for agitation, behavioral problems, confusion, decreased concentration, dysphoric mood, hallucinations, self-injury, sleep disturbance and suicidal ideas. The patient is not nervous/anxious and is not hyperactive. Allergies   Allergen Reactions    Adhesive Tape Other (See Comments)     Can cause bloody skin    Augmentin [Amoxicillin-Pot Clavulanate] Swelling     Hands swell like gloves    Bactrim [Sulfamethoxazole-Trimethoprim] Other (See Comments)     Hands swelled    Cipro [Ciprofloxacin Hcl]      Breast pain    Doxycycline     Sulfa Antibiotics Other (See Comments)     Hands swelled    Tetracyclines & Related Other (See Comments)     unsure    Vancomycin     Iv Dye [Iodides] Rash         Prior to Visit Medications    Medication Sig Taking?  Authorizing Provider   alendronate (FOSAMAX) 70 MG tablet Take 1 tablet by mouth every 7 days Take on empty stomach with full glass of water and stay upright for 1 hour Yes BERYL Brian - CNP   albuterol (PROVENTIL) (2.5 MG/3ML) 0.083% nebulizer solution  Yes Historical Provider,

## 2023-10-24 ENCOUNTER — HOSPITAL ENCOUNTER (OUTPATIENT)
Dept: MAMMOGRAPHY | Age: 63
Discharge: HOME | End: 2023-10-26
Payer: COMMERCIAL

## 2023-10-24 VITALS — BODY MASS INDEX: 27.74 KG/M2 | WEIGHT: 183 LBS | HEIGHT: 68 IN

## 2023-10-24 DIAGNOSIS — Z12.31 SCREENING MAMMOGRAM FOR BREAST CANCER: ICD-10-CM

## 2023-10-24 PROCEDURE — 77063 BREAST TOMOSYNTHESIS BI: CPT

## 2023-10-26 DIAGNOSIS — R92.8 ABNORMAL FINDINGS ON DIAGNOSTIC IMAGING OF BREAST: Primary | ICD-10-CM

## 2023-10-27 ENCOUNTER — TELEPHONE (OUTPATIENT)
Dept: MAMMOGRAPHY | Age: 63
End: 2023-10-27

## 2023-10-27 NOTE — TELEPHONE ENCOUNTER
Call to patient in reference to her mammogram performed at [facility] on 10/24/23. Instructed patient that the radiologist has recommended additional breast imaging in order to make a final determination and further recommendations. Patient verbalized understanding and states she viewed results on [employer] and is refusing to schedule for additional follow up recommendations. Patient states for 10+ years every time she gets mammogram done, they require additional images and does not want to pursue this year. Routed note to Rosa Marroquin NP, referring provider to make aware.  GLO QureshiN, RN -Breast Navigator

## 2023-10-27 NOTE — TELEPHONE ENCOUNTER
I called patient at 11:36 am and left a message I would highly recommend having additional imaging due to the spiculated appearance and this could be breast cancer.

## 2023-12-05 ENCOUNTER — TELEPHONE (OUTPATIENT)
Dept: FAMILY MEDICINE CLINIC | Age: 63
End: 2023-12-05

## 2023-12-05 DIAGNOSIS — N63.10 MASS OF RIGHT BREAST, UNSPECIFIED QUADRANT: Primary | ICD-10-CM

## 2023-12-05 NOTE — TELEPHONE ENCOUNTER
Per Alley   Orders placed what the heck this is already several weeks after her mammogram!       Patient informed orders placed

## 2023-12-05 NOTE — TELEPHONE ENCOUNTER
Patient called stating she tried to schedule the US right breast .  They will not schedule until pt has diagnostic mammogram of the right breast.  Pt had screening mammogram on 10/24/2023 and US orders were put in on 10/26/2023  please advise.   Last seen 8/10/2023  Next appt 2/12/2024

## 2023-12-14 ENCOUNTER — TELEPHONE (OUTPATIENT)
Dept: MAMMOGRAPHY | Age: 63
End: 2023-12-14

## 2023-12-14 NOTE — TELEPHONE ENCOUNTER
Call to patient to schedule for right breast ultrasound recommended from 10/24/23 abnormal mammogram. Patient previously refusing additional images but is now agreeable to proceed. Patient scheduled for 12/18/23 at 10 am at [facility].  GLO De La CruzN, RN - Breast Navigator

## 2023-12-17 DIAGNOSIS — J43.8 OTHER EMPHYSEMA (HCC): ICD-10-CM

## 2023-12-18 RX ORDER — BUDESONIDE AND FORMOTEROL FUMARATE DIHYDRATE 160; 4.5 UG/1; UG/1
2 AEROSOL RESPIRATORY (INHALATION) 2 TIMES DAILY
Qty: 11 G | Refills: 0 | OUTPATIENT
Start: 2023-12-18

## 2023-12-28 ENCOUNTER — TELEPHONE (OUTPATIENT)
Dept: BREAST CENTER | Age: 63
End: 2023-12-28

## 2023-12-28 DIAGNOSIS — N64.59 ABNORMAL BREAST FINDING: Primary | ICD-10-CM

## 2023-12-28 NOTE — TELEPHONE ENCOUNTER
Discussed radiology's review with patient. Patient agrees to proceed with a biopsy, as recommended. No blood thinners. Order taken to [address] [facility]. to call patient. Will schedule a consultation once patient completes her biopsy.

## 2024-01-05 ENCOUNTER — HOSPITAL ENCOUNTER (OUTPATIENT)
Dept: GENERAL RADIOLOGY | Age: 64
End: 2024-01-05
Attending: SURGERY
Payer: COMMERCIAL

## 2024-01-05 DIAGNOSIS — N64.59 ABNORMAL BREAST FINDING: ICD-10-CM

## 2024-01-05 PROCEDURE — 88305 TISSUE EXAM BY PATHOLOGIST: CPT

## 2024-01-05 PROCEDURE — 88342 IMHCHEM/IMCYTCHM 1ST ANTB: CPT

## 2024-01-05 PROCEDURE — 88341 IMHCHEM/IMCYTCHM EA ADD ANTB: CPT

## 2024-01-05 PROCEDURE — 2709999900 US BREAST BIOPSY W LOC DEVICE 1ST LESION RIGHT

## 2024-01-05 PROCEDURE — 77065 DX MAMMO INCL CAD UNI: CPT

## 2024-01-05 PROCEDURE — 88360 TUMOR IMMUNOHISTOCHEM/MANUAL: CPT

## 2024-01-05 NOTE — PROGRESS NOTES
Met with patient prior to her breast biopsy. Instructed on ultrasound guided right  breast biopsy procedure. Denies use of blood thinners or aspirin products within the past 5 days. Instructed that results will be available in approximately 3-5 business days. She confirms that she has an active [facility] account, but indicates that she does not use it much. She requests to receive breast biopsy results by phone. Instructed that her physician will also be notified of results. She has been referred to Dr. Jermaine Uriarte.  Provided with folder containing my contact information and post biopsy discharge instructions. Instructed to call me if she has any questions or concerns about her biopsy. Verbalizes understanding. Electronically signed by Tiarra Garcia RN, BSN on 1/5/2024 at 11:35 AM

## 2024-01-11 ENCOUNTER — TELEPHONE (OUTPATIENT)
Dept: GENERAL RADIOLOGY | Age: 64
End: 2024-01-11

## 2024-01-11 LAB — SURGICAL PATHOLOGY REPORT: NORMAL

## 2024-01-11 NOTE — TELEPHONE ENCOUNTER
Per patient request at time of breast biopsy, I called with her breast biopsy results. Instructed that the results of her recent right breast biopsy indicates invasive ductal carcinoma (ER/WI positive, HER-2 negative). Instructed on cancer type and hormone receptor status. Patient has already been referred to Dr. Jermaine Uriarte. I advised patient that this office will be in contact with her soon regarding a consult appointment.  Instructed on next steps including a metastatic workup and meeting with Flower Bahena RN Oncology Navigator. Questions answered to her apparent satisfaction. She is agreeable to receiving information by mail. Packet with information on treatment of invasive breast cancer and local resources placed in outgoing mail bin. Breast pathology report faxed to Dr. Jermaine Uriarte.    Patient is planning on going to [place] in March.

## 2024-01-12 ENCOUNTER — TELEPHONE (OUTPATIENT)
Dept: BREAST CENTER | Age: 64
End: 2024-01-12

## 2024-01-12 ENCOUNTER — TELEPHONE (OUTPATIENT)
Dept: FAMILY MEDICINE CLINIC | Age: 64
End: 2024-01-12

## 2024-01-12 DIAGNOSIS — C50.911 INVASIVE DUCTAL CARCINOMA OF RIGHT BREAST (HCC): Primary | ICD-10-CM

## 2024-01-12 NOTE — TELEPHONE ENCOUNTER
Patient called C/O of neck pain for a few days, Pt declined walk in or UC asking what to do? Pt has not tried anything OTC. She did ask if heat or ice would help?  Please advise.  Last seen 8/10/2023  Next appt 2/12/2024  CHRIS/Shabana BUTLER

## 2024-01-12 NOTE — TELEPHONE ENCOUNTER
GORDO Hankins notified she received return call from patient and scheduled with  on 1/16/2024 arrival 7:30am, appointment @ 8:30am.       Electronically signed by Anitha Hernandez RN on 1/12/24 at 3:04 PM EST

## 2024-01-12 NOTE — TELEPHONE ENCOUNTER
RN attempted to contact patient to schedule consult with . RN reached patient VM and left detailed message of why was calling and office number for patient to call office back and set up appt.      Patient needs scheduled for NEW BREAST CANCER-Right IDC ER/MD(+) HER2(-)-bx [facility]-ref by CARMEN Cruz  CXR/FLOWSHEET/MEASURE/LABS/BR[facility]LEY ARRIVE 7:30AM       Was going to offer patient 1/16/2024 arrive 7:30am for 8:30am appointment with .       Patient will need CXR and labwork ordered once scheduled.         Electronically signed by Anitha Hernandez RN on 1/12/24 at 8:28 AM EST

## 2024-01-16 ENCOUNTER — OFFICE VISIT (OUTPATIENT)
Dept: BREAST CENTER | Age: 64
End: 2024-01-16
Payer: COMMERCIAL

## 2024-01-16 ENCOUNTER — HOSPITAL ENCOUNTER (OUTPATIENT)
Dept: GENERAL RADIOLOGY | Age: 64
Discharge: HOME | End: 2024-01-18
Payer: COMMERCIAL

## 2024-01-16 ENCOUNTER — TELEPHONE (OUTPATIENT)
Dept: CASE MANAGEMENT | Age: 64
End: 2024-01-16

## 2024-01-16 VITALS
OXYGEN SATURATION: 98 % | WEIGHT: 189 LBS | HEIGHT: 68 IN | DIASTOLIC BLOOD PRESSURE: 70 MMHG | TEMPERATURE: 98.1 F | HEART RATE: 72 BPM | RESPIRATION RATE: 20 BRPM | SYSTOLIC BLOOD PRESSURE: 118 MMHG | BODY MASS INDEX: 28.64 KG/M2

## 2024-01-16 DIAGNOSIS — Z85.3 PERSONAL HISTORY OF MALIGNANT NEOPLASM OF BREAST: ICD-10-CM

## 2024-01-16 DIAGNOSIS — C50.911 INVASIVE DUCTAL CARCINOMA OF RIGHT BREAST (HCC): Primary | ICD-10-CM

## 2024-01-16 DIAGNOSIS — C50.911 INVASIVE DUCTAL CARCINOMA OF RIGHT BREAST (HCC): ICD-10-CM

## 2024-01-16 LAB
ABSOLUTE IMMATURE GRANULOCYTE: 0.06 K/UL (ref 0–0.58)
ALBUMIN SERPL-MCNC: 4 G/DL (ref 3.5–5.2)
ALP BLD-CCNC: 87 U/L (ref 35–104)
ALT SERPL-CCNC: 20 U/L (ref 0–32)
ANION GAP SERPL CALCULATED.3IONS-SCNC: 14 MMOL/L (ref 7–16)
AST SERPL-CCNC: 20 U/L (ref 0–31)
BASOPHILS ABSOLUTE: 0.07 K/UL (ref 0–0.2)
BASOPHILS RELATIVE PERCENT: 1 % (ref 0–2)
BILIRUB SERPL-MCNC: 0.5 MG/DL (ref 0–1.2)
BUN BLDV-MCNC: 14 MG/DL (ref 6–23)
CALCIUM SERPL-MCNC: 9.5 MG/DL (ref 8.6–10.2)
CHLORIDE BLD-SCNC: 103 MMOL/L (ref 98–107)
CO2: 20 MMOL/L (ref 22–29)
CREAT SERPL-MCNC: 0.6 MG/DL (ref 0.5–1)
EOSINOPHILS ABSOLUTE: 0.15 K/UL (ref 0.05–0.5)
EOSINOPHILS RELATIVE PERCENT: 2 % (ref 0–6)
GFR SERPL CREATININE-BSD FRML MDRD: >60 ML/MIN/1.73M2
GLUCOSE BLD-MCNC: 109 MG/DL (ref 74–99)
HCT VFR BLD CALC: 41.4 % (ref 34–48)
HEMOGLOBIN: 13.9 G/DL (ref 11.5–15.5)
IMMATURE GRANULOCYTES: 1 % (ref 0–5)
LYMPHOCYTES ABSOLUTE: 1.16 K/UL (ref 1.5–4)
LYMPHOCYTES RELATIVE PERCENT: 13 % (ref 20–42)
MCH RBC QN AUTO: 32.5 PG (ref 26–35)
MCHC RBC AUTO-ENTMCNC: 33.6 G/DL (ref 32–34.5)
MCV RBC AUTO: 96.7 FL (ref 80–99.9)
MONOCYTES ABSOLUTE: 0.78 K/UL (ref 0.1–0.95)
MONOCYTES RELATIVE PERCENT: 9 % (ref 2–12)
NEUTROPHILS ABSOLUTE: 6.97 K/UL (ref 1.8–7.3)
NEUTROPHILS RELATIVE PERCENT: 76 % (ref 43–80)
PDW BLD-RTO: 12.6 % (ref 11.5–15)
PLATELET # BLD: 290 K/UL (ref 130–450)
PMV BLD AUTO: 9.2 FL (ref 7–12)
POTASSIUM SERPL-SCNC: 4.5 MMOL/L (ref 3.5–5)
RBC # BLD: 4.28 M/UL (ref 3.5–5.5)
SODIUM BLD-SCNC: 137 MMOL/L (ref 132–146)
TOTAL PROTEIN: 7.6 G/DL (ref 6.4–8.3)
WBC # BLD: 9.2 K/UL (ref 4.5–11.5)

## 2024-01-16 PROCEDURE — 36415 COLL VENOUS BLD VENIPUNCTURE: CPT | Performed by: SURGERY

## 2024-01-16 PROCEDURE — 99204 OFFICE O/P NEW MOD 45 MIN: CPT | Performed by: SURGERY

## 2024-01-16 PROCEDURE — 99203 OFFICE O/P NEW LOW 30 MIN: CPT | Performed by: SURGERY

## 2024-01-16 PROCEDURE — 71046 X-RAY EXAM CHEST 2 VIEWS: CPT

## 2024-01-16 NOTE — PATIENT INSTRUCTIONS
LABS AND GENETIC TESTING DONE TODAY. DR JUÁREZ WILL CALL WITH RESULTS.  CALL YOUR PCP FOR CLEARANCE FOR SURGERY  WE WILL GET SURGERY SCHEDULED ONCE WE HAVE CLEARANCE

## 2024-01-16 NOTE — PROGRESS NOTES
Date of Visit: 1/16/2024  New Patient Invasive Breast Cancer    01/16/24      DIAGNOSIS:  1. (01/16/24) RIGHT (12:00-3) invasive ductal carcinoma. Grade 2  Clinical stage: [id number]  ER (95) NE (95) HER2 (0)  2. (2013) Personal history of melanoma  * Hip  3. Personal history of DVT    IMAGING/PROCEDURES:  1. (10/24/23) BILATERAL st-mammogram: BIRADS-0  * RIGHT spiculated mass  2. (12/18/23) RIGHT US: BIRADS-4  * RIGHT (12:00-3) 1.1cm mass  * RIGHT axillary node 2.3cm  3. (01/05/24) RIGHT US biopsy      HISTORY OF PRESENT ILLNESS  Kenya Bacon was in the office today for her consultation regarding a diagnosis of right breast cancer.    Kenya underwent screening studies in October.  A small mass was noted in the right breast.  The patient underwent diagnostic imaging confirming the presence of a suspicious 1.1 cm mass.  Note was made of an axillary lymph node reported to be 2.3 cm.  The patient underwent image guided biopsy confirming the presence of an invasive carcinoma in the breast.    She is in the office now to discuss the above and receive any additional recommendations.    BREAST SYMPTOMS  Kenya has no symptoms to report.  Specifically, she has no palpable masses.  She has noted no nipple discharge or retraction.  She has no skin retraction or discoloration.    PAST BREAST HISTORY  Kenya has had no prior breast biopsies and has had no breast surgeries.    BREAST CANCER RISK FACTORS  Family history: There is no family history of breast or ovary cancer.  Of note, the patient herself is a melanoma survivor from 2013.    PAST MEDICAL/SURGICAL HISTORY  Past Medical History:   Diagnosis Date    Allergic rhinitis     Allergic sinusitis 08/04/2021    Asthma without status asthmaticus 02/14/2023    Borderline hyperlipidemia 10/13/2022    Breast cancer (HCC) 2024    RIGHT IDC    Cancer (HCC) dx 2013    melanoma left hip (had excision, chemo but no radiation)    Chronic midline low back pain with left-sided

## 2024-01-16 NOTE — TELEPHONE ENCOUNTER
Met with patient regarding her recent breast cancer diagnosis at Dr. Uriarte surgical consultation appointment. Instructed on next steps including breast surgery options and possible additional imaging per Dr. Uriarte recommendations. Provided patient with \"Be A Survivor: Your guide to breast cancer treatment\", chapter 4 reviewed. Patient verified that she not receive extensive literature from Eneida LOZANO, Breast Health Navigator via the mail. We reviewed what to expect in the packet once she does receive it. Patient given opportunity to ask questions.Encouraged patient if she has any further medical oncology questions she can reach out to Dr Uriarte's staff and they forward her call to my office. Patient verbalizes understanding and appreciative of nurse navigator visit.

## 2024-01-22 ENCOUNTER — OFFICE VISIT (OUTPATIENT)
Dept: FAMILY MEDICINE CLINIC | Age: 64
End: 2024-01-22
Payer: COMMERCIAL

## 2024-01-22 VITALS
TEMPERATURE: 96.8 F | OXYGEN SATURATION: 98 % | RESPIRATION RATE: 16 BRPM | BODY MASS INDEX: 28.95 KG/M2 | HEIGHT: 68 IN | SYSTOLIC BLOOD PRESSURE: 118 MMHG | WEIGHT: 191 LBS | DIASTOLIC BLOOD PRESSURE: 74 MMHG | HEART RATE: 73 BPM

## 2024-01-22 DIAGNOSIS — C50.911 INVASIVE DUCTAL CARCINOMA OF RIGHT BREAST (HCC): ICD-10-CM

## 2024-01-22 DIAGNOSIS — Z01.818 PRE-OP EXAM: Primary | ICD-10-CM

## 2024-01-22 PROCEDURE — 99213 OFFICE O/P EST LOW 20 MIN: CPT | Performed by: NURSE PRACTITIONER

## 2024-01-22 PROCEDURE — 93000 ELECTROCARDIOGRAM COMPLETE: CPT | Performed by: NURSE PRACTITIONER

## 2024-01-22 SDOH — ECONOMIC STABILITY: FOOD INSECURITY: WITHIN THE PAST 12 MONTHS, YOU WORRIED THAT YOUR FOOD WOULD RUN OUT BEFORE YOU GOT MONEY TO BUY MORE.: NEVER TRUE

## 2024-01-22 SDOH — ECONOMIC STABILITY: FOOD INSECURITY: WITHIN THE PAST 12 MONTHS, THE FOOD YOU BOUGHT JUST DIDN'T LAST AND YOU DIDN'T HAVE MONEY TO GET MORE.: NEVER TRUE

## 2024-01-22 SDOH — ECONOMIC STABILITY: INCOME INSECURITY: HOW HARD IS IT FOR YOU TO PAY FOR THE VERY BASICS LIKE FOOD, HOUSING, MEDICAL CARE, AND HEATING?: NOT HARD AT ALL

## 2024-01-22 ASSESSMENT — ENCOUNTER SYMPTOMS
COLOR CHANGE: 0
NAUSEA: 0
VOMITING: 0
TROUBLE SWALLOWING: 0
CHEST TIGHTNESS: 0
SHORTNESS OF BREATH: 0
ABDOMINAL PAIN: 0
WHEEZING: 0
SINUS PAIN: 0
RHINORRHEA: 0
BACK PAIN: 1
COUGH: 1
CONSTIPATION: 0
DIARRHEA: 0
VOICE CHANGE: 0

## 2024-01-22 ASSESSMENT — PATIENT HEALTH QUESTIONNAIRE - PHQ9
SUM OF ALL RESPONSES TO PHQ QUESTIONS 1-9: 2
SUM OF ALL RESPONSES TO PHQ QUESTIONS 1-9: 2
1. LITTLE INTEREST OR PLEASURE IN DOING THINGS: 1
SUM OF ALL RESPONSES TO PHQ QUESTIONS 1-9: 2
2. FEELING DOWN, DEPRESSED OR HOPELESS: 1
SUM OF ALL RESPONSES TO PHQ QUESTIONS 1-9: 2
SUM OF ALL RESPONSES TO PHQ9 QUESTIONS 1 & 2: 2

## 2024-01-22 NOTE — PROGRESS NOTES
ear and ear canal normal bilaterally, normal hearing, Nose without deformity, nasal mucosa and turbinates normal without polyps   Throat: clear, tongue midline, tonsils 1+, no drainage, no masses or lesions noted, good dentition  Neck: supple and non-tender without mass, trachea midline, no cervical lymphadenopathy, no bruit, no thyromegaly or nodules  Cardiovascular: regular rate and regular rhythm, normal S1 and S2,  no murmurs, rubs, clicks, or gallop. Distal pulses intact, no carotid bruits. No edema  Pulmonary/Chest: clear to auscultation bilaterally, no wheezes, rales or rhonchi, normal air movement, no respiratory distress  Abdomen: soft, non-tender, non-distended, normal bowel sounds, no masses or hepatosplenomegaly  Musculoskeletal: Normal ROM, no joint swelling, deformity or tenderness   Neurologic:  gait, coordination and speech normal  Extremities: no clubbing, cyanosis, or edema.   Psychiatric: Good eye contact, normal mood and affect, answers questions appropriately    I have reviewed my findings and recommendations with Kenya Bacon.    Alley Cruz, APRN - CNP, NP-C, FNP-BC

## 2024-01-22 NOTE — ASSESSMENT & PLAN NOTE
Cleared for surgery, CMP, CBC reviewed in epic from DR Uriarte, EKG today Sinus rhythm negative precordial T- waves unchanged from 3/5/2021 reviewed with Dr Zimmerman.

## 2024-01-23 LAB
Lab: NEGATIVE
Lab: NORMAL

## 2024-01-30 ENCOUNTER — PREP FOR PROCEDURE (OUTPATIENT)
Dept: BREAST CENTER | Age: 64
End: 2024-01-30

## 2024-01-30 DIAGNOSIS — C50.911 INVASIVE DUCTAL CARCINOMA OF RIGHT BREAST (HCC): Primary | ICD-10-CM

## 2024-01-31 NOTE — PROGRESS NOTES
[facility]   PRE-ADMISSION TESTING GENERAL INSTRUCTIONS  PAT Phone Number: [phone]      GENERAL INSTRUCTIONS:    [x] Antibacterial Soap Shower Night before and/or AM of surgery.  [] CHG Wipes instruction sheet and wipes given.  [] Hibiclens shower the night before AND the morning of surgery.   -Do not use Hibiclens on your face or head.   [x] Do not wear makeup, lotions, powders, deodorant.   [x] Nothing by mouth after midnight; including gum, candy, mints, or water.  [x] You may brush your teeth, gargle, but do NOT swallow water.   [x] No tobacco products, illegal drugs, or alcohol within 24 hours of your surgery.  [x] Jewelry or valuables should not be brought to the hospital. All body and/or tongue piercing's must be removed prior to arriving to hospital. No contact lens or hair pins.   [x] Arrange transportation with a responsible adult  to and from the hospital. Arrange for someone to be with you for the remainder of the day and for 24 hours after your procedure due to having had anesthesia.          -Who will be your  for transportation?__HUSBAND______        -Who will be staying with you for 24 hrs after your procedure? __HUSBAND______   [x] Bring insurance card and photo ID.  [] Bring copy of living will or healthcare power of  papers to be placed in your electronic record.  [] Urine Pregnancy test will be preformed the day of surgery. A specimen sample may be brought from home.  [] Transfusion (Green) Bracelet: Please bring with you to hospital, day of surgery.     PARKING INSTRUCTIONS:     [x] ARRIVAL DATE & TIME: 2/7 0930  [x] Times are subject to change. We will contact you the business day before surgery if that were to occur.  [x] Enter into the [facility] Entrance. Two people may accompany you. Masks are not required.  [x] Parking Lot \"I\" is where you will park. It is located on the corner of [address] and [address]. The entrance is

## 2024-02-01 RX ORDER — SODIUM CHLORIDE, SODIUM LACTATE, POTASSIUM CHLORIDE, CALCIUM CHLORIDE 600; 310; 30; 20 MG/100ML; MG/100ML; MG/100ML; MG/100ML
INJECTION, SOLUTION INTRAVENOUS CONTINUOUS
Status: CANCELLED | OUTPATIENT
Start: 2024-02-01

## 2024-02-01 RX ORDER — SODIUM CHLORIDE 0.9 % (FLUSH) 0.9 %
5-40 SYRINGE (ML) INJECTION EVERY 12 HOURS SCHEDULED
Status: CANCELLED | OUTPATIENT
Start: 2024-02-01

## 2024-02-01 RX ORDER — SODIUM CHLORIDE 9 MG/ML
INJECTION, SOLUTION INTRAVENOUS PRN
Status: CANCELLED | OUTPATIENT
Start: 2024-02-01

## 2024-02-01 RX ORDER — SODIUM CHLORIDE 0.9 % (FLUSH) 0.9 %
5-40 SYRINGE (ML) INJECTION PRN
Status: CANCELLED | OUTPATIENT
Start: 2024-02-01

## 2024-02-01 NOTE — H&P (VIEW-ONLY)
DIAGNOSIS:  1. (01/16/24) RIGHT (12:00-3) invasive ductal carcinoma. Grade 2  Clinical stage: [id number]  ER (95) [place] (95) HER2 (0)  2. (2013) Personal history of melanoma  * Hip  3. Personal history of DVT     IMAGING/PROCEDURES:  1. (10/24/23) BILATERAL st-mammogram: BIRADS-0  * RIGHT spiculated mass  2. (12/18/23) RIGHT US: BIRADS-4  * RIGHT (12:00-3) 1.1cm mass  * RIGHT axillary node 2.3cm  3. (01/05/24) RIGHT US biopsy      HISTORY OF PRESENT ILLNESS  Kenya Bacon presents for surgical management of right breast cancer.    Kenya underwent screening studies in October.  There is a small mass noted in the right breast.  Diagnostic imaging confirmed the presence of a suspicious 1.1 centimeter mass.  Image guided biopsy confirmed the presence of invasive carcinoma.    BREAST SYMPTOMS  Kenya has no breast symptoms to report.    PAST BREAST HISTORY  Kenya had undergone no prior biopsies and has had no breast surgeries.    PAST MEDICAL/SURGICAL HISTORY  Past Medical History:   Diagnosis Date    Allergic rhinitis     Allergic sinusitis 08/04/2021    Asthma without status asthmaticus 02/14/2023    Borderline hyperlipidemia 10/13/2022    Breast cancer (HCC) 2024    RIGHT IDC    Cancer (HCC) dx 2013    melanoma left hip (had excision, chemo but no radiation)    Chronic midline low back pain with left-sided sciatica 04/19/2023    COPD (chronic obstructive pulmonary disease) ([facility])     states cant afford her symbicort inhaler    Depression     Dysuria 08/04/2021    Emphysema of lung (HCC)     Hx of blood clots     DVT rt leg while going thru chemo (was inactive)    Lumbar spondylosis 04/25/2023    MRSA (methicillin resistant Staphylococcus aureus) infection 06/28/2021    Tinea versicolor 08/04/2021    Ulcerative colitis (HCC)     Urinary tract infection with hematuria 08/04/2021     Past Surgical History:   Procedure Laterality Date    ABDOMEN SURGERY      multiple d/t severe ulcerative colitis  rectum & lge

## 2024-02-01 NOTE — H&P
DIAGNOSIS:  1. (01/16/24) RIGHT (12:00-3) invasive ductal carcinoma. Grade 2  Clinical stage: C0eR5T3  ER (95) [place] (95) HER2 (0)  2. (2013) Personal history of melanoma  * Hip  3. Personal history of DVT     IMAGING/PROCEDURES:  1. (10/24/23) BILATERAL st-mammogram: BIRADS-0  * RIGHT spiculated mass  2. (12/18/23) RIGHT US: BIRADS-4  * RIGHT (12:00-3) 1.1cm mass  * RIGHT axillary node 2.3cm  3. (01/05/24) RIGHT US biopsy      HISTORY OF PRESENT ILLNESS  Kenya Bacon presents for surgical management of right breast cancer.    Kenya underwent screening studies in October.  There is a small mass noted in the right breast.  Diagnostic imaging confirmed the presence of a suspicious 1.1 centimeter mass.  Image guided biopsy confirmed the presence of invasive carcinoma.    BREAST SYMPTOMS  Kenya has no breast symptoms to report.    PAST BREAST HISTORY  Kenya had undergone no prior biopsies and has had no breast surgeries.    PAST MEDICAL/SURGICAL HISTORY  Past Medical History:   Diagnosis Date    Allergic rhinitis     Allergic sinusitis 08/04/2021    Asthma without status asthmaticus 02/14/2023    Borderline hyperlipidemia 10/13/2022    Breast cancer (HCC) 2024    RIGHT IDC    Cancer (HCC) dx 2013    melanoma left hip (had excision, chemo but no radiation)    Chronic midline low back pain with left-sided sciatica 04/19/2023    COPD (chronic obstructive pulmonary disease) ([facility])     states cant afford her symbicort inhaler    Depression     Dysuria 08/04/2021    Emphysema of lung (HCC)     Hx of blood clots     DVT rt leg while going thru chemo (was inactive)    Lumbar spondylosis 04/25/2023    MRSA (methicillin resistant Staphylococcus aureus) infection 06/28/2021    Tinea versicolor 08/04/2021    Ulcerative colitis (HCC)     Urinary tract infection with hematuria 08/04/2021     Past Surgical History:   Procedure Laterality Date    ABDOMEN SURGERY      multiple d/t severe ulcerative colitis  rectum & lge

## 2024-02-02 ENCOUNTER — TELEPHONE (OUTPATIENT)
Dept: BREAST CENTER | Age: 64
End: 2024-02-02

## 2024-02-02 NOTE — TELEPHONE ENCOUNTER
SPOKE WITH PATIENT:  Patient surgery has been scheduled with surgery scheduling 2/7/24 11:30am / Nuclear injection Dr Uriarte 11:00am / Arrival 9:30am / Mag seed 2/6/24 @ 10:00am / arrival 9:30am CLEVE.  Surgery: Right breast mag seed localized lumpectomy - blue dye injection - Right axillary sentinel lymph node biopsy - General Trident / Neoprobe.   Patient notified of date and time of surgery. Patient was instructed to enter the [facility] entrance of the hospital. Patient was instructed NPO after midnight the night prior to surgery.  Patient was instructed NO ASA products or blood thinners 3-5days prior to surgery.   Patient was instructed to bring a sports bra with her day of surgery.  Medical clearance has been obtained.  No prior authorization required.  Post op visit 2/20/24 @ 2/20/24

## 2024-02-06 ENCOUNTER — HOSPITAL ENCOUNTER (OUTPATIENT)
Dept: GENERAL RADIOLOGY | Age: 64
Discharge: HOME | End: 2024-02-08
Attending: SURGERY
Payer: COMMERCIAL

## 2024-02-06 DIAGNOSIS — C50.911 INVASIVE DUCTAL CARCINOMA OF RIGHT BREAST (HCC): ICD-10-CM

## 2024-02-06 PROCEDURE — 77065 DX MAMMO INCL CAD UNI: CPT

## 2024-02-06 PROCEDURE — 19285 PERQ DEV BREAST 1ST US IMAG: CPT

## 2024-02-07 ENCOUNTER — ANESTHESIA EVENT (OUTPATIENT)
Dept: OPERATING ROOM | Age: 64
End: 2024-02-07
Payer: COMMERCIAL

## 2024-02-07 ENCOUNTER — HOSPITAL ENCOUNTER (OUTPATIENT)
Age: 64
Setting detail: OUTPATIENT SURGERY
Discharge: HOME | End: 2024-02-07
Attending: SURGERY | Admitting: SURGERY
Payer: COMMERCIAL

## 2024-02-07 ENCOUNTER — HOSPITAL ENCOUNTER (OUTPATIENT)
Dept: GENERAL RADIOLOGY | Age: 64
Setting detail: OUTPATIENT SURGERY
Discharge: HOME | End: 2024-02-09
Attending: SURGERY
Payer: COMMERCIAL

## 2024-02-07 ENCOUNTER — ANESTHESIA (OUTPATIENT)
Dept: OPERATING ROOM | Age: 64
End: 2024-02-07
Payer: COMMERCIAL

## 2024-02-07 ENCOUNTER — HOSPITAL ENCOUNTER (OUTPATIENT)
Dept: NUCLEAR MEDICINE | Age: 64
Discharge: HOME | End: 2024-02-09
Payer: COMMERCIAL

## 2024-02-07 VITALS
RESPIRATION RATE: 18 BRPM | BODY MASS INDEX: 28.79 KG/M2 | OXYGEN SATURATION: 97 % | WEIGHT: 190 LBS | HEART RATE: 52 BPM | DIASTOLIC BLOOD PRESSURE: 71 MMHG | HEIGHT: 68 IN | TEMPERATURE: 97.8 F | SYSTOLIC BLOOD PRESSURE: 146 MMHG

## 2024-02-07 DIAGNOSIS — C50.911 INVASIVE DUCTAL CARCINOMA OF RIGHT BREAST (HCC): ICD-10-CM

## 2024-02-07 DIAGNOSIS — C50.911 MALIGNANT NEOPLASM OF RIGHT BREAST (HCC): ICD-10-CM

## 2024-02-07 DIAGNOSIS — Z01.812 PRE-OPERATIVE LABORATORY EXAMINATION: Primary | ICD-10-CM

## 2024-02-07 LAB
ERYTHROCYTE [DISTWIDTH] IN BLOOD BY AUTOMATED COUNT: 13.1 % (ref 11.5–15)
HCT VFR BLD AUTO: 41.7 % (ref 34–48)
HGB BLD-MCNC: 14.3 G/DL (ref 11.5–15.5)
MCH RBC QN AUTO: 33 PG (ref 26–35)
MCHC RBC AUTO-ENTMCNC: 34.3 G/DL (ref 32–34.5)
MCV RBC AUTO: 96.3 FL (ref 80–99.9)
PLATELET # BLD AUTO: 227 K/UL (ref 130–450)
PMV BLD AUTO: 9.3 FL (ref 7–12)
RBC # BLD AUTO: 4.33 M/UL (ref 3.5–5.5)
WBC OTHER # BLD: 8 K/UL (ref 4.5–11.5)

## 2024-02-07 PROCEDURE — 6360000002 HC RX W HCPCS

## 2024-02-07 PROCEDURE — 2580000003 HC RX 258: Performed by: SURGERY

## 2024-02-07 PROCEDURE — A4216 STERILE WATER/SALINE, 10 ML: HCPCS | Performed by: SURGERY

## 2024-02-07 PROCEDURE — 3430000000 HC RX DIAGNOSTIC RADIOPHARMACEUTICAL: Performed by: RADIOLOGY

## 2024-02-07 PROCEDURE — 38792 RA TRACER ID OF SENTINL NODE: CPT

## 2024-02-07 PROCEDURE — 76098 X-RAY EXAM SURGICAL SPECIMEN: CPT

## 2024-02-07 PROCEDURE — 6360000002 HC RX W HCPCS: Performed by: SURGERY

## 2024-02-07 PROCEDURE — A9520 TC99 TILMANOCEPT DIAG 0.5MCI: HCPCS | Performed by: RADIOLOGY

## 2024-02-07 PROCEDURE — 2500000003 HC RX 250 WO HCPCS

## 2024-02-07 PROCEDURE — 85027 COMPLETE CBC AUTOMATED: CPT

## 2024-02-07 PROCEDURE — 6360000002 HC RX W HCPCS: Performed by: NURSE ANESTHETIST, CERTIFIED REGISTERED

## 2024-02-07 PROCEDURE — 2500000003 HC RX 250 WO HCPCS: Performed by: SURGERY

## 2024-02-07 PROCEDURE — 2500000003 HC RX 250 WO HCPCS: Performed by: ANESTHESIOLOGY

## 2024-02-07 RX ORDER — CIPROFLOXACIN 2 MG/ML
400 INJECTION, SOLUTION INTRAVENOUS EVERY 12 HOURS
Status: DISCONTINUED | OUTPATIENT
Start: 2024-02-07 | End: 2024-02-07 | Stop reason: HOSPADM

## 2024-02-07 RX ORDER — ROCURONIUM BROMIDE 10 MG/ML
INJECTION, SOLUTION INTRAVENOUS PRN
Status: DISCONTINUED | OUTPATIENT
Start: 2024-02-07 | End: 2024-02-07 | Stop reason: SDUPTHER

## 2024-02-07 RX ORDER — SODIUM CHLORIDE, SODIUM LACTATE, POTASSIUM CHLORIDE, CALCIUM CHLORIDE 600; 310; 30; 20 MG/100ML; MG/100ML; MG/100ML; MG/100ML
INJECTION, SOLUTION INTRAVENOUS CONTINUOUS
Status: DISCONTINUED | OUTPATIENT
Start: 2024-02-07 | End: 2024-02-07 | Stop reason: HOSPADM

## 2024-02-07 RX ORDER — SODIUM CHLORIDE 9 MG/ML
INJECTION INTRAVENOUS PRN
Status: DISCONTINUED | OUTPATIENT
Start: 2024-02-07 | End: 2024-02-07 | Stop reason: HOSPADM

## 2024-02-07 RX ORDER — SODIUM CHLORIDE 0.9 % (FLUSH) 0.9 %
5-40 SYRINGE (ML) INJECTION PRN
Status: DISCONTINUED | OUTPATIENT
Start: 2024-02-07 | End: 2024-02-07 | Stop reason: HOSPADM

## 2024-02-07 RX ORDER — PROPOFOL 10 MG/ML
INJECTION, EMULSION INTRAVENOUS PRN
Status: DISCONTINUED | OUTPATIENT
Start: 2024-02-07 | End: 2024-02-07 | Stop reason: SDUPTHER

## 2024-02-07 RX ORDER — FENTANYL CITRATE 50 UG/ML
INJECTION, SOLUTION INTRAMUSCULAR; INTRAVENOUS PRN
Status: DISCONTINUED | OUTPATIENT
Start: 2024-02-07 | End: 2024-02-07 | Stop reason: SDUPTHER

## 2024-02-07 RX ORDER — ONDANSETRON 2 MG/ML
INJECTION INTRAMUSCULAR; INTRAVENOUS PRN
Status: DISCONTINUED | OUTPATIENT
Start: 2024-02-07 | End: 2024-02-07 | Stop reason: SDUPTHER

## 2024-02-07 RX ORDER — SODIUM CHLORIDE 0.9 % (FLUSH) 0.9 %
5-40 SYRINGE (ML) INJECTION EVERY 12 HOURS SCHEDULED
Status: DISCONTINUED | OUTPATIENT
Start: 2024-02-07 | End: 2024-02-07 | Stop reason: HOSPADM

## 2024-02-07 RX ORDER — HYDROMORPHONE HYDROCHLORIDE 1 MG/ML
0.25 INJECTION, SOLUTION INTRAMUSCULAR; INTRAVENOUS; SUBCUTANEOUS EVERY 5 MIN PRN
Status: DISCONTINUED | OUTPATIENT
Start: 2024-02-07 | End: 2024-02-07 | Stop reason: HOSPADM

## 2024-02-07 RX ORDER — DEXAMETHASONE SODIUM PHOSPHATE 10 MG/ML
INJECTION INTRAMUSCULAR; INTRAVENOUS PRN
Status: DISCONTINUED | OUTPATIENT
Start: 2024-02-07 | End: 2024-02-07 | Stop reason: SDUPTHER

## 2024-02-07 RX ORDER — SODIUM CHLORIDE 9 MG/ML
INJECTION, SOLUTION INTRAVENOUS PRN
Status: DISCONTINUED | OUTPATIENT
Start: 2024-02-07 | End: 2024-02-07 | Stop reason: HOSPADM

## 2024-02-07 RX ORDER — PHENYLEPHRINE HCL IN 0.9% NACL 1 MG/10 ML
SYRINGE (ML) INTRAVENOUS PRN
Status: DISCONTINUED | OUTPATIENT
Start: 2024-02-07 | End: 2024-02-07 | Stop reason: SDUPTHER

## 2024-02-07 RX ORDER — HYDROMORPHONE HYDROCHLORIDE 1 MG/ML
0.5 INJECTION, SOLUTION INTRAMUSCULAR; INTRAVENOUS; SUBCUTANEOUS EVERY 5 MIN PRN
Status: DISCONTINUED | OUTPATIENT
Start: 2024-02-07 | End: 2024-02-07 | Stop reason: HOSPADM

## 2024-02-07 RX ORDER — ONDANSETRON 2 MG/ML
4 INJECTION INTRAMUSCULAR; INTRAVENOUS
Status: DISCONTINUED | OUTPATIENT
Start: 2024-02-07 | End: 2024-02-07 | Stop reason: HOSPADM

## 2024-02-07 RX ORDER — LIDOCAINE HYDROCHLORIDE 20 MG/ML
INJECTION, SOLUTION INTRAVENOUS PRN
Status: DISCONTINUED | OUTPATIENT
Start: 2024-02-07 | End: 2024-02-07 | Stop reason: SDUPTHER

## 2024-02-07 RX ORDER — METHYLENE BLUE 10 MG/ML
INJECTION INTRAVENOUS PRN
Status: DISCONTINUED | OUTPATIENT
Start: 2024-02-07 | End: 2024-02-07 | Stop reason: HOSPADM

## 2024-02-07 RX ORDER — MIDAZOLAM HYDROCHLORIDE 1 MG/ML
INJECTION INTRAMUSCULAR; INTRAVENOUS PRN
Status: DISCONTINUED | OUTPATIENT
Start: 2024-02-07 | End: 2024-02-07 | Stop reason: SDUPTHER

## 2024-02-07 RX ORDER — OXYCODONE HYDROCHLORIDE AND ACETAMINOPHEN 5; 325 MG/1; MG/1
1 TABLET ORAL EVERY 6 HOURS PRN
Qty: 10 TABLET | Refills: 0 | Status: SHIPPED | OUTPATIENT
Start: 2024-02-07 | End: 2024-02-10

## 2024-02-07 RX ORDER — GLYCOPYRROLATE 0.2 MG/ML
INJECTION INTRAMUSCULAR; INTRAVENOUS PRN
Status: DISCONTINUED | OUTPATIENT
Start: 2024-02-07 | End: 2024-02-07 | Stop reason: SDUPTHER

## 2024-02-07 RX ADMIN — SUGAMMADEX 200 MG: 100 INJECTION, SOLUTION INTRAVENOUS at 15:04

## 2024-02-07 RX ADMIN — FENTANYL CITRATE 50 MCG: 50 INJECTION, SOLUTION INTRAMUSCULAR; INTRAVENOUS at 14:08

## 2024-02-07 RX ADMIN — Medication 50 MCG: at 14:42

## 2024-02-07 RX ADMIN — LIDOCAINE HYDROCHLORIDE 60 MG: 20 INJECTION, SOLUTION INTRAVENOUS at 12:45

## 2024-02-07 RX ADMIN — CIPROFLOXACIN 400 MG: 2 INJECTION, SOLUTION INTRAVENOUS at 12:55

## 2024-02-07 RX ADMIN — HYDROMORPHONE HYDROCHLORIDE 0.5 MG: 1 INJECTION, SOLUTION INTRAMUSCULAR; INTRAVENOUS; SUBCUTANEOUS at 15:22

## 2024-02-07 RX ADMIN — MIDAZOLAM 2 MG: 1 INJECTION INTRAMUSCULAR; INTRAVENOUS at 12:37

## 2024-02-07 RX ADMIN — FENTANYL CITRATE 100 MCG: 50 INJECTION, SOLUTION INTRAMUSCULAR; INTRAVENOUS at 13:07

## 2024-02-07 RX ADMIN — FENTANYL CITRATE 50 MCG: 50 INJECTION, SOLUTION INTRAMUSCULAR; INTRAVENOUS at 14:55

## 2024-02-07 RX ADMIN — FENTANYL CITRATE 50 MCG: 50 INJECTION, SOLUTION INTRAMUSCULAR; INTRAVENOUS at 14:28

## 2024-02-07 RX ADMIN — Medication 50 MCG: at 13:51

## 2024-02-07 RX ADMIN — ROCURONIUM BROMIDE 50 MG: 10 INJECTION, SOLUTION INTRAVENOUS at 12:45

## 2024-02-07 RX ADMIN — TILMANOCEPT 475 MICRO CURIE: KIT at 12:34

## 2024-02-07 RX ADMIN — DEXAMETHASONE SODIUM PHOSPHATE 10 MG: 10 INJECTION INTRAMUSCULAR; INTRAVENOUS at 12:51

## 2024-02-07 RX ADMIN — ROCURONIUM BROMIDE 20 MG: 10 INJECTION, SOLUTION INTRAVENOUS at 14:28

## 2024-02-07 RX ADMIN — SODIUM CHLORIDE, POTASSIUM CHLORIDE, SODIUM LACTATE AND CALCIUM CHLORIDE: 600; 310; 30; 20 INJECTION, SOLUTION INTRAVENOUS at 10:46

## 2024-02-07 RX ADMIN — FENTANYL CITRATE 100 MCG: 50 INJECTION, SOLUTION INTRAMUSCULAR; INTRAVENOUS at 12:45

## 2024-02-07 RX ADMIN — PROPOFOL 200 MG: 10 INJECTION, EMULSION INTRAVENOUS at 12:45

## 2024-02-07 RX ADMIN — GLYCOPYRROLATE 0.2 MG: 0.2 INJECTION INTRAMUSCULAR; INTRAVENOUS at 13:46

## 2024-02-07 RX ADMIN — HYDROMORPHONE HYDROCHLORIDE 0.5 MG: 1 INJECTION, SOLUTION INTRAMUSCULAR; INTRAVENOUS; SUBCUTANEOUS at 15:30

## 2024-02-07 RX ADMIN — SODIUM CHLORIDE, POTASSIUM CHLORIDE, SODIUM LACTATE AND CALCIUM CHLORIDE: 600; 310; 30; 20 INJECTION, SOLUTION INTRAVENOUS at 14:35

## 2024-02-07 RX ADMIN — ONDANSETRON 4 MG: 2 INJECTION INTRAMUSCULAR; INTRAVENOUS at 14:55

## 2024-02-07 ASSESSMENT — LIFESTYLE VARIABLES: SMOKING_STATUS: 1

## 2024-02-07 ASSESSMENT — PAIN DESCRIPTION - LOCATION
LOCATION: BREAST
LOCATION: BREAST

## 2024-02-07 ASSESSMENT — PAIN - FUNCTIONAL ASSESSMENT: PAIN_FUNCTIONAL_ASSESSMENT: 0-10

## 2024-02-07 ASSESSMENT — ENCOUNTER SYMPTOMS: SHORTNESS OF BREATH: 1

## 2024-02-07 ASSESSMENT — PAIN SCALES - GENERAL
PAINLEVEL_OUTOF10: 10
PAINLEVEL_OUTOF10: 10

## 2024-02-07 ASSESSMENT — PAIN DESCRIPTION - DESCRIPTORS
DESCRIPTORS: BURNING
DESCRIPTORS: BURNING

## 2024-02-07 NOTE — ANESTHESIA POSTPROCEDURE EVALUATION
Department of Anesthesiology  Postprocedure Note    Patient: Kenya Bacon  MRN: [id number]  YOB: 1960  Date of evaluation: 2/7/2024    Procedure Summary       Date: 02/07/24 Room / Location: [address]    Anesthesia Start: 1237 Anesthesia Stop: 1513    Procedure: Right breast mag seed localized lumpectomy - blue dye injection - Right axillary sentinel lymph node biopsy -- General trident / neoprobe (Right: Breast) Diagnosis:       Malignant neoplasm of right breast (HCC)      (Malignant neoplasm of right breast (HCC) [C50.911])    Surgeons: Jermaine Uriarte MD Responsible Provider: Nichelle Balderas MD    Anesthesia Type: General ASA Status: 3            Anesthesia Type: General    Galo Phase I: Galo Score: 10    Galo Phase II:      Anesthesia Post Evaluation    Patient location during evaluation: PACU  Patient participation: complete - patient participated  Level of consciousness: awake and alert  Airway patency: patent  Nausea & Vomiting: no nausea and no vomiting  Cardiovascular status: blood pressure returned to baseline and hemodynamically stable  Respiratory status: acceptable and spontaneous ventilation  Hydration status: euvolemic  Multimodal analgesia pain management approach  Pain management: adequate    No notable events documented.

## 2024-02-07 NOTE — ANESTHESIA PRE PROCEDURE
\"[id number]\", \"BEART\", \"[id number]\"     Type & Screen (If Applicable):  No results found for: \"LABABO\", \"LABRH\"    Drug/Infectious Status (If Applicable):  No results found for: \"HIV\", \"HEPCAB\"    COVID-19 Screening (If Applicable):   Lab Results   Component Value Date/Time    COVID19 Not Detected 03/05/2021 12:00 PM           Anesthesia Evaluation    Airway: Mallampati: II  TM distance: >3 FB   Neck ROM: full  Mouth opening: > = 3 FB   Dental: normal exam         Pulmonary: breath sounds clear to auscultation  (+)  COPD:  shortness of breath:         asthma: current smoker          Patient did not smoke on day of surgery.                 Cardiovascular:            Rhythm: regular                      Neuro/Psych:   (+) neuromuscular disease:, psychiatric history:            GI/Hepatic/Renal:   (+) PUD          Endo/Other:    (+) malignancy/cancer.                 Abdominal:             Vascular:          Other Findings:         Anesthesia Plan      general     ASA 3       Induction: intravenous.    MIPS: Postoperative opioids intended and Prophylactic antiemetics administered.  Anesthetic plan and risks discussed with patient.      Plan discussed with CRNA.                  Bijal Godoy MD   2/7/2024

## 2024-02-07 NOTE — OP NOTE
Operative Note      Patient: Kenya Bacon  YOB: 1960  MRN: [id number]    Date of Procedure: 2/7/2024    Pre-Op Diagnosis Codes:     * Malignant neoplasm of right breast (HCC) [C50.911]    Post-Op Diagnosis: Same       Procedure(s):  Right breast mag seed localized lumpectomy - blue dye injection - Right axillary sentinel lymph node biopsy -- General trident / neoprobe    Surgeon(s):  Jermaine Uriarte MD    Assistant:   Resident: Ar Barnett DO    Anesthesia: General    Estimated Blood Loss (mL): 50cc    Complications: None    Specimens:   ID Type Source Tests Collected by Time Destination   A : RIGHT BREAST LUMPECTOMY Tissue Breast SURGICAL PATHOLOGY Jermaine Uriarte MD 2/7/2024 1344    B : RIGHT BREAST NEW LATERAL MARGIN, stitch marks new margin Tissue Breast SURGICAL PATHOLOGY Jermaine Uriarte MD 2/7/2024 1416    C : RIGHT BREAST NEW INFERIOR MARGIN, stitch marks new margin Tissue Breast SURGICAL PATHOLOGY Jermaine Uriarte MD 2/7/2024 1419    D : sentinel Lymph node #1, blue 153 Tissue Lymph Node SURGICAL PATHOLOGY Jermaine Uriarte MD 2/7/2024 1436      Detailed Description of Procedure:   Procedure note:  1.  Intraoperative injection of right breast for sentinel lymph node mapping  2.  Right Magseed localized lumpectomy  3.  Right axillary sentinel lymph node biopsy    In the days leading up to the procedure the patient underwent successful Magseed localization.  On the day of the procedure in the preoperative holding area the right periareolar tissue was cleansed with alcohol and radionucleotide was injected for mapping.    The patient was then brought to the operating room placed supine on the operating table and given a general anesthetic.  The periareolar tissue on the right side was again cleansed with alcohol and dilute methylene blue was injected.  The breast was massaged for 3 minutes.  A sterile field was then established with Hibiclens paint and the appropriate towels and drapes

## 2024-02-12 ENCOUNTER — OFFICE VISIT (OUTPATIENT)
Dept: FAMILY MEDICINE CLINIC | Age: 64
End: 2024-02-12
Payer: COMMERCIAL

## 2024-02-12 VITALS
HEART RATE: 63 BPM | TEMPERATURE: 97.8 F | BODY MASS INDEX: 30.01 KG/M2 | WEIGHT: 198 LBS | DIASTOLIC BLOOD PRESSURE: 72 MMHG | HEIGHT: 68 IN | SYSTOLIC BLOOD PRESSURE: 138 MMHG | OXYGEN SATURATION: 100 % | RESPIRATION RATE: 18 BRPM

## 2024-02-12 DIAGNOSIS — R73.01 IMPAIRED FASTING BLOOD SUGAR: ICD-10-CM

## 2024-02-12 DIAGNOSIS — I83.90 VARICOSE VEINS OF CALF: ICD-10-CM

## 2024-02-12 DIAGNOSIS — M79.622 PAIN IN BOTH UPPER ARMS: ICD-10-CM

## 2024-02-12 DIAGNOSIS — M81.0 AGE-RELATED OSTEOPOROSIS WITHOUT CURRENT PATHOLOGICAL FRACTURE: Primary | ICD-10-CM

## 2024-02-12 DIAGNOSIS — J45.20 MILD INTERMITTENT ASTHMA WITHOUT STATUS ASTHMATICUS WITHOUT COMPLICATION: ICD-10-CM

## 2024-02-12 DIAGNOSIS — M79.621 PAIN IN BOTH UPPER ARMS: ICD-10-CM

## 2024-02-12 DIAGNOSIS — Z86.39 HX OF HYPERLIPIDEMIA: ICD-10-CM

## 2024-02-12 LAB — SURGICAL PATHOLOGY REPORT: NORMAL

## 2024-02-12 PROCEDURE — 99214 OFFICE O/P EST MOD 30 MIN: CPT | Performed by: NURSE PRACTITIONER

## 2024-02-12 RX ORDER — ALBUTEROL SULFATE 2.5 MG/3ML
2.5 SOLUTION RESPIRATORY (INHALATION) EVERY 4 HOURS PRN
Qty: 120 EACH | Refills: 1 | Status: SHIPPED | OUTPATIENT
Start: 2024-02-12

## 2024-02-12 RX ORDER — ALENDRONATE SODIUM 70 MG/1
70 TABLET ORAL
Qty: 4 TABLET | Refills: 5 | Status: SHIPPED | OUTPATIENT
Start: 2024-02-12

## 2024-02-12 NOTE — ASSESSMENT & PLAN NOTE
Borderline controlled and now having some swelling discussed using support hose on in am and off at bedtime.

## 2024-02-12 NOTE — PROGRESS NOTES
OFFICE PROGRESS NOTE  [facility]  [facility]  [address] [place] [address]  Dept: [phone]   Chief Complaint   Patient presents with    Osteoporosis    Health Maintenance     Due for pne, pap, shingles, polio, RSV, flu(declined), low dose ct, yearly A1C    Arm Pain     Both upper arms.        ASSESSMENT/PLAN   1. Age-related osteoporosis without current pathological fracture  Assessment & Plan:  Stable continue Fosamax 70 mg weekly with full glass of water and sit upright for an hour. No other medications for 30 - 60 minutes. Declines DEXA at this time due to recent breast surgery.   Orders:  -     alendronate (FOSAMAX) 70 MG tablet; Take 1 tablet by mouth every 7 days Take on empty stomach with full glass of water and stay upright for 1 hour, Disp-4 tablet, R-5Normal  2. Pain in both upper arms  Assessment & Plan:  New She is very large busted so explained the weight can be compressing the nerve  3. Varicose veins of calf  Assessment & Plan:   Borderline controlled and now having some swelling discussed using support hose on in am and off at bedtime.   4. Mild intermittent asthma without status asthmaticus without complication  -     albuterol (PROVENTIL) (2.5 MG/3ML) 0.083% nebulizer solution; Take 3 mLs by nebulization every 4 hours as needed for Wheezing, Disp-120 each, R-1Normal  -     CBC with Auto Differential; Future  -     Comprehensive Metabolic Panel; Future  5. Hx of hyperlipidemia  -     Lipid Panel; Future  6. Impaired fasting blood sugar  Assessment & Plan:     Orders:  -     Hemoglobin A1C; Future       Reviewed labs:   Reviewed notes from   Reviewed radiology     Discussed taking medications as directed and adverse effects    Return in about 3 months (around 5/12/2024) for asthma, CT lung screening.     HPI:   She is following up on osteoporosis and is doing well on the Alendronate 70 mg weekly. Denies any adverse effects at this time.      She

## 2024-02-12 NOTE — ASSESSMENT & PLAN NOTE
Stable continue Fosamax 70 mg weekly with full glass of water and sit upright for an hour. No other medications for 30 - 60 minutes. Declines DEXA at this time due to recent breast surgery.

## 2024-02-13 ENCOUNTER — OFFICE VISIT (OUTPATIENT)
Dept: BREAST CENTER | Age: 64
End: 2024-02-13

## 2024-02-13 ENCOUNTER — TELEPHONE (OUTPATIENT)
Dept: BREAST CENTER | Age: 64
End: 2024-02-13

## 2024-02-13 VITALS
HEIGHT: 68 IN | RESPIRATION RATE: 18 BRPM | OXYGEN SATURATION: 98 % | DIASTOLIC BLOOD PRESSURE: 80 MMHG | TEMPERATURE: 97.3 F | SYSTOLIC BLOOD PRESSURE: 116 MMHG | HEART RATE: 87 BPM | WEIGHT: 195 LBS | BODY MASS INDEX: 29.55 KG/M2

## 2024-02-13 DIAGNOSIS — C50.911 INVASIVE DUCTAL CARCINOMA OF RIGHT BREAST (HCC): Primary | ICD-10-CM

## 2024-02-13 PROCEDURE — 99024 POSTOP FOLLOW-UP VISIT: CPT | Performed by: SURGERY

## 2024-02-13 NOTE — TELEPHONE ENCOUNTER
RN submitted Oncotype RX and scanned RX and confirmation was received under media tab in chart. Once results are finalized will scan into patient chart.     Electronically signed by Anitha Hernandez RN on 2/13/24 at 1:00 PM EST

## 2024-02-13 NOTE — PROGRESS NOTES
Date of visit: 2/13/2024 01/16/24      DIAGNOSIS:  1. (01/16/24) RIGHT (12:00-3) invasive ductal carcinoma. Grade 2  Clinical stage: [id number]  ER (95) MI (95) HER2 (0)  2. (2013) Personal history of melanoma  * Hip  3. Personal history of DVT    IMAGING/PROCEDURES:  1. (10/24/23) BILATERAL st-mammogram: BIRADS-0  * RIGHT spiculated mass  2. (12/18/23) RIGHT US: BIRADS-4  * RIGHT (12:00-3) 1.1cm mass  * RIGHT axillary node 2.3cm  3. (01/05/24) RIGHT US biopsy      Breast History  Kenya Bacon was in the office today for a postoperative visit.    Kenya underwent an uneventful lumpectomy 6 days ago.  When I called her with her path report yesterday she had been experiencing some swelling.  I asked her to come in today for an assessment.    Breast Symptoms  Kenya again reports swelling and heaviness.  She also reports some discoloration that resolved.    Breast Examination  On examination there is some enlargement of the right breast.  It is soft.  There is no bruising or discoloration.    Pathology  I discussed the surgical pathology report with Kenya the resected tumor measured 10 mm.  The surgical margins were negative.  There was no metastases in the sentinel node.    Assessment/Plan  Kenya Bacon has completed the surgical management of right breast cancer.    I informed Kenya that based on her phone conversation I was somewhat concerned that she may have a hematoma however I believe she has a seroma.    I encouraged her to wear a supportive bra 24 hours a day.    Today in the office we also made referrals to both medical oncology and radiation oncology.  I shared with the patient adjuvant breast radiation is an important adjunct to breast surgery.  I also shared with her that it is almost certain it would be recommended that she takes hormonal therapy.  I informed her that chemotherapy at this point is still is uncertainty and we have sent a genomic test.    If there were no issues we will see

## 2024-02-14 DIAGNOSIS — Z86.39 HX OF HYPERLIPIDEMIA: ICD-10-CM

## 2024-02-14 DIAGNOSIS — R73.01 IMPAIRED FASTING BLOOD SUGAR: ICD-10-CM

## 2024-02-14 DIAGNOSIS — J45.20 MILD INTERMITTENT ASTHMA WITHOUT STATUS ASTHMATICUS WITHOUT COMPLICATION: ICD-10-CM

## 2024-02-14 LAB
ABSOLUTE IMMATURE GRANULOCYTE: 0.03 K/UL (ref 0–0.58)
ALBUMIN SERPL-MCNC: 3.9 G/DL (ref 3.5–5.2)
ALP BLD-CCNC: 72 U/L (ref 35–104)
ALT SERPL-CCNC: 20 U/L (ref 0–32)
ANION GAP SERPL CALCULATED.3IONS-SCNC: 17 MMOL/L (ref 7–16)
AST SERPL-CCNC: 25 U/L (ref 0–31)
BASOPHILS ABSOLUTE: 0.07 K/UL (ref 0–0.2)
BASOPHILS RELATIVE PERCENT: 1 % (ref 0–2)
BILIRUB SERPL-MCNC: 0.5 MG/DL (ref 0–1.2)
BUN BLDV-MCNC: 15 MG/DL (ref 6–23)
CALCIUM SERPL-MCNC: 9.2 MG/DL (ref 8.6–10.2)
CHLORIDE BLD-SCNC: 103 MMOL/L (ref 98–107)
CHOLESTEROL: 250 MG/DL
CO2: 17 MMOL/L (ref 22–29)
CREAT SERPL-MCNC: 0.6 MG/DL (ref 0.5–1)
EOSINOPHILS ABSOLUTE: 0.16 K/UL (ref 0.05–0.5)
EOSINOPHILS RELATIVE PERCENT: 2 % (ref 0–6)
GFR SERPL CREATININE-BSD FRML MDRD: >60 ML/MIN/1.73M2
GLUCOSE BLD-MCNC: 104 MG/DL (ref 74–99)
HBA1C MFR BLD: 5.6 % (ref 4–5.6)
HCT VFR BLD CALC: 42.4 % (ref 34–48)
HDLC SERPL-MCNC: 103 MG/DL
HEMOGLOBIN: 14.1 G/DL (ref 11.5–15.5)
IMMATURE GRANULOCYTES: 0 % (ref 0–5)
LDL CHOLESTEROL: 116 MG/DL
LYMPHOCYTES ABSOLUTE: 1.34 K/UL (ref 1.5–4)
LYMPHOCYTES RELATIVE PERCENT: 19 % (ref 20–42)
MCH RBC QN AUTO: 33.6 PG (ref 26–35)
MCHC RBC AUTO-ENTMCNC: 33.3 G/DL (ref 32–34.5)
MCV RBC AUTO: 101 FL (ref 80–99.9)
MONOCYTES ABSOLUTE: 0.71 K/UL (ref 0.1–0.95)
MONOCYTES RELATIVE PERCENT: 10 % (ref 2–12)
NEUTROPHILS ABSOLUTE: 4.74 K/UL (ref 1.8–7.3)
NEUTROPHILS RELATIVE PERCENT: 67 % (ref 43–80)
PDW BLD-RTO: 13.1 % (ref 11.5–15)
PLATELET # BLD: 246 K/UL (ref 130–450)
PMV BLD AUTO: 10 FL (ref 7–12)
POTASSIUM SERPL-SCNC: 4.3 MMOL/L (ref 3.5–5)
RBC # BLD: 4.2 M/UL (ref 3.5–5.5)
SODIUM BLD-SCNC: 137 MMOL/L (ref 132–146)
TOTAL PROTEIN: 6.9 G/DL (ref 6.4–8.3)
TRIGL SERPL-MCNC: 153 MG/DL
VLDLC SERPL CALC-MCNC: 31 MG/DL
WBC # BLD: 7.1 K/UL (ref 4.5–11.5)

## 2024-02-15 NOTE — RESULT ENCOUNTER NOTE
So labs are stable. Cholesterol is a little elevated so watch the high fat foods and when you are up and feeling better start back to your exercise. A1c is just slightly worse than  last time and triglycerides are up slightly

## 2024-02-21 PROBLEM — Z01.818 PRE-OP EXAM: Status: RESOLVED | Noted: 2024-01-22 | Resolved: 2024-02-21

## 2024-02-22 ENCOUNTER — TELEPHONE (OUTPATIENT)
Dept: BREAST CENTER | Age: 64
End: 2024-02-22

## 2024-02-22 NOTE — TELEPHONE ENCOUNTER
[facility] oncotype results scanned under media tab of patient's chart and routed to Dr Uriarte    Electronically signed by Nicky Philippe RN on 2/22/24 at 6:58 AM EST

## 2024-02-23 LAB — SURGICAL PATHOLOGY REPORT: NORMAL

## 2024-02-27 ENCOUNTER — TELEPHONE (OUTPATIENT)
Dept: CASE MANAGEMENT | Age: 64
End: 2024-02-27

## 2024-02-27 ENCOUNTER — TELEPHONE (OUTPATIENT)
Dept: RADIATION ONCOLOGY | Age: 64
End: 2024-02-27

## 2024-02-27 ENCOUNTER — HOSPITAL ENCOUNTER (OUTPATIENT)
Dept: RADIATION ONCOLOGY | Age: 64
Discharge: HOME | End: 2024-02-27
Payer: COMMERCIAL

## 2024-02-27 VITALS
TEMPERATURE: 97.2 F | RESPIRATION RATE: 18 BRPM | DIASTOLIC BLOOD PRESSURE: 69 MMHG | SYSTOLIC BLOOD PRESSURE: 107 MMHG | WEIGHT: 192 LBS | OXYGEN SATURATION: 95 % | HEART RATE: 76 BPM | BODY MASS INDEX: 29.19 KG/M2

## 2024-02-27 DIAGNOSIS — C50.919 MALIGNANT NEOPLASM OF FEMALE BREAST, UNSPECIFIED ESTROGEN RECEPTOR STATUS, UNSPECIFIED LATERALITY, UNSPECIFIED SITE OF BREAST (HCC): Primary | ICD-10-CM

## 2024-02-27 PROCEDURE — 99245 OFF/OP CONSLTJ NEW/EST HI 55: CPT | Performed by: RADIOLOGY

## 2024-02-27 PROCEDURE — 99205 OFFICE O/P NEW HI 60 MIN: CPT

## 2024-02-27 RX ORDER — BUDESONIDE AND FORMOTEROL FUMARATE DIHYDRATE 160; 4.5 UG/1; UG/1
2 AEROSOL RESPIRATORY (INHALATION) 2 TIMES DAILY
COMMUNITY

## 2024-02-27 NOTE — TELEPHONE ENCOUNTER
Kenya Bacon  2/27/2024  Ht Readings from Last 1 Encounters:   02/13/24 1.727 m (5' 8\")     Wt Readings from Last 10 Encounters:   02/27/24 87.1 kg (192 lb)   02/13/24 88.5 kg (195 lb)   02/12/24 89.8 kg (198 lb)   02/07/24 86.2 kg (190 lb)   01/22/24 86.6 kg (191 lb)   01/16/24 85.7 kg (189 lb)   10/24/23 83 kg (183 lb)   05/24/23 83.7 kg (184 lb 9.6 oz)   05/09/23 84.8 kg (187 lb)   04/25/23 84.8 kg (187 lb)     BMI=29.19    Assessment: Met with Rena and her  Blayne while in for radiation oncology consult with Dr. Solis for breast cancer. Rena had lumpectomy on 2/7/24. She has an appointment scheduled with medical oncologist Dr. Gregorio on 2/29/24. Patient reports good appetite, she had gained weight over Christmas holiday and would like to lose a few pounds. Explained weight maintenance during radiation treatment, with importance of adequate energy/protein to maintain energy and lean muscle mass. Currently no nutrition concerns at this time. Provided contact information for questions or concerns.     Jayde Ro RD

## 2024-02-27 NOTE — PROGRESS NOTES
Radiation Oncology      Jesus Alberto Solis III, MD MS DABR   [facility]      Referring Physician: Dr. ANANTH Uriarte      Primary Care Physician:Alley Cruz, APRN - CNP   Primary Oncologist: Dr. JOVANNY Gregorio        Diagnosis: pT1b pNo(sn) cMo  right breast cancer s/p BCS   -ER+   -MD+   -HER2-   -Gr2   -ODx  *HO melanoma        Service:  Radiation Oncology consultation performed on 2/27/24        HPI:        Rena is a pleasant 63 year old with a complex PMH recently diagnosed with early stage right breast cancer s/p BCS.   Patient had a Bilateral mammogram done on 10/25/23 which revealed a spiculated mass in the central right breast requires further evaluation. No mammographic evidence of malignancy in the left breast. She then had a Right Breast US done on 12/19/23 which showed within the right breast at 12 o'clock position there is a solid mass which measures 1.1 cm x 1 cm.  This mass is located 3 cm from nipple. This mass is highly suspicious for malignancy. No other solid mass is seen within the right breast. Within the right axilla note is made of a 2.3 x 1.7 x 1.0 cm lymph node. Patient then had a Right Breast Biopsy done on 01/05/24 for which pathology revealed IDC, grade 2, ER/MD+, HER-2-. She then underwent a Right Breast Lumpectomy with Right Axillary SLN Biopsy done on 02/07/24 for which pathology revealed IDC, Erika score of 6, grade 2, All margins negative, 1/1 ASLN negative, ER/MD+, HER-2-. [id number].  The patient presents today to discuss fractionated external beam radiation therapy as a component of multidisciplinary, adjuvant management.  We reviewed the available medical records including the complete medical history of this pt today prior to consultation. Epic -CE and available scanned documents per the Epic Media tab were reviewed PRN.  A complete ROS was also performed today and is noted below.  During consultation today I 
PRAPARE - Transportation     Lack of Transportation (Medical): Not on file     Lack of Transportation (Non-Medical): No   Physical Activity: Not on file   Stress: Not on file   Social Connections: Not on file   Intimate Partner Violence: Not on file   Housing Stability: Unknown (1/22/2024)    Housing Stability Vital Sign     Unable to Pay for Housing in the Last Year: Not on file     Number of Places Lived in the Last Year: Not on file     Unstable Housing in the Last Year: No           Occupation: Unemployed  Retired:  NO        REVIEW OF SYSTEMS: <<For Level 5, 10 or more systems>>   Approximately >20 minutes spent with patient and her  Blayne today to discuss RT to the right breast. The patient stated that her right breast cancer was found on a mammogram. Patient had a Bilateral mammogram done on 10/25/23 which revealed a spiculated mass in the central right breast requires further evaluation. No mammographic evidence of malignancy in the left breast. She then had a Right Breast US done on 12/19/23 which showed Within the right breast at 12 o'clock position there is a solid mass which measures 1.1 cm x 1 cm.  This mass is located 3 cm from nipple. This mass is highly suspicious for malignancy. No other solid mass is seen within the right breast. Within the right axilla note is made of a 2.3 x 1.7 x 1.0 cm lymph node. Patient then had a Right Breast Biopsy done on 01/05/24 for which pathology revealed IDC, grade 2, ER/ID+, HER-2-. She then underwent a Right Breast Lumpectomy with Right Axillary SLN Biopsy done on 02/07/24 for which pathology revealed IDC, Erika score of 6, grade 2, All margins negative, 1/1 ASLN negative, ER/ID+, HER-2-. [id number].  Education given on RT to the right breast utilizing handouts and slides. Patient verbalized understanding of care and all questions were answered from a nursing perspective. Consent for RT to the right breast signed and scanned into

## 2024-02-27 NOTE — TELEPHONE ENCOUNTER
Met with patient and , Blayne, during her initial consultation with Dr. Solis for radiation therapy for her Stage IA (T1b, N0, M0) ER/NJ+, Her2- right breast cancer diagnosis per Dr. Uriarte.  Introduced myself and explained my role with cancer patients receiving treatment within our cancer centers.  Patient was friendly and receptive.  Denies any problems with insurance coverage for medication or transportation for the daily treatments.  Reviewed additional ancillary services available at the center.  Denies any referral needs at this time.  Questions answered to her satisfaction regarding scheduling, possible side effects, etc.  Patient states that she was hoping to visit her sister in [place] in two weeks before her 's busy work schedule started but will look into schedule something else after treatment completion.  Reviewed that she is scheduled for medical oncology consultation with Dr. Gregorio 2/29/2024 at the 's location.  She inquired if Dr. Gregorio came to this location as well because it is closer to her and she is more comfortable driving to this location.  Instructed her to notify Dr. Gregorio Thursday of her request for follow up appointments at this location if that's what she prefers.  Verbalizes understanding.  Informed patient that she will be contacted with her CT Simulation with Dr. Solis at [facility] once his consultation note/orders are completed.  Verbalizes understanding.  Patient provided with additional written literature of [facility]: Hormone Therapy for Breast Cancer, [facility]: Arimidex medication, and [facility] Support Group information.  Provided patient with my contact information, office hours, and encouragement to call me with questions or concerns.  Patient verbalizes understanding and appreciative of nurse navigator visit.  Emotional support provided and greater than 25 minutes spent with patient.  Nurse navigator will continue to follow.  TASHIA Russo, BSW, RN, OCN

## 2024-02-29 ENCOUNTER — HOSPITAL ENCOUNTER (OUTPATIENT)
Dept: INFUSION THERAPY | Age: 64
Discharge: HOME | End: 2024-02-29

## 2024-02-29 ENCOUNTER — TELEPHONE (OUTPATIENT)
Dept: CASE MANAGEMENT | Age: 64
End: 2024-02-29

## 2024-02-29 ENCOUNTER — OFFICE VISIT (OUTPATIENT)
Dept: ONCOLOGY | Age: 64
End: 2024-02-29
Payer: COMMERCIAL

## 2024-02-29 VITALS
BODY MASS INDEX: 29.33 KG/M2 | HEIGHT: 68 IN | DIASTOLIC BLOOD PRESSURE: 72 MMHG | SYSTOLIC BLOOD PRESSURE: 140 MMHG | HEART RATE: 67 BPM | TEMPERATURE: 97.2 F | WEIGHT: 193.5 LBS | OXYGEN SATURATION: 97 %

## 2024-02-29 DIAGNOSIS — Z79.811 PREVENTATIVE USE OF AROMATASE INHIBITORS: ICD-10-CM

## 2024-02-29 DIAGNOSIS — M81.0 AGE-RELATED OSTEOPOROSIS WITHOUT CURRENT PATHOLOGICAL FRACTURE: ICD-10-CM

## 2024-02-29 DIAGNOSIS — C50.911 INVASIVE DUCTAL CARCINOMA OF RIGHT BREAST (HCC): Primary | ICD-10-CM

## 2024-02-29 PROCEDURE — 99204 OFFICE O/P NEW MOD 45 MIN: CPT | Performed by: STUDENT IN AN ORGANIZED HEALTH CARE EDUCATION/TRAINING PROGRAM

## 2024-02-29 NOTE — TELEPHONE ENCOUNTER
Returned patient phone message yesterday for clarification regarding sending a prescription to a [facility] pharmacy.  She stated that it was for when she gets her Aromastase inhibitor medication.  Informed her that she can notified them at her medical oncology consultation appointment today with Dr. Gregorio at [facility] and they will adjust her favorite pharmacy for her.  Reviewed that Dr. Gregorio will not have her start the medication until after radiation therapy treatments are completed.  Informed her that the radiation therapy department will be contacting her soon to schedule her CT SIM appointment.  Verbalizes understanding.  Denies any additional questions/needs from NN at this time.  Will continue to follow.  TASHIA Russo, BSW, RN, OCN  Oncology Nurse Navigator

## 2024-02-29 NOTE — TELEPHONE ENCOUNTER
Met with patient and  Blayne during her initial consultation with  for her recent breast cancer diagnosis. Introduced myself and explained my role with patients receiving treatment at our center. Patient was friendly and receptive.Instructed on next steps including RT per 's recommendations and follow up care.Patient requesting [facility]'s location as she is more familiar with that area. She has previously met with Lin MATHIS during her RT Consult. Provided patient with transportation resource list, local support group information and literature on Breast Cancer (Patient Resource Booklet). Reviewed resources available to her such as Social Work, Dietician and Financial Aid Navigator. Patient denies any needs at this time. Patient currently uses [facility] for medications. She is concerned about how much they will cost, so depending on medication patient will let us know where to send for the best pricing. Patient discussed Arimidex but patient last Dexa done in November of 2021 showed osteoporosis. Patient currently on Fosamax but she states that she has a hard time swallowing pills so she doesn't always take like she is supposed to. 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 She does drive but doesn't really like to but denies any transportation issues. Patient also states that she was treated with chemotherapy for 6 weeks in 2013 for Melanoma. She does not remember what medication she received but was treated at [facility] by Dr Ambrosio. New patient nursing assessment, medical history, surgical history, family history completed. Medication list reviewed and updated. Provided with my contact information and instructed patient to call me with questions or concerns. Verbalizes understanding. Patient appreciative of visit. Flower CODYN,RN-OCN Nurse Navigator

## 2024-03-01 NOTE — PROGRESS NOTES
Patient did stop at check out window, ok'd to leave without AVS.  Patient has MYCHART.  Patient to be scheduled @ [facility] per Flower PITTS -patient navigator.  Notified Ifeoma SCHRADER -[email or web address] via in basket messaging system in epic.     
[facility] PHYSICIANS [facility] CARE [facility] MED ONCOLOGY  [address] [facility] [address]  Dept: [phone]  Loc: [phone]    Clinic Consultation Note      Date of Encounter: 02/29/2024     Referring Provider:  ***    Reason for Visit:   ***        PCP:  Alley Cruz, APRN - CNP    Demographics: 63 y.o. female    Chief Complaint   Patient presents with    Breast Cancer    New Patient         History of Present Illness of Initial Consultation (***):  The patient is a 63 y.o. female ***    Oncology History   Invasive ductal carcinoma of right breast (HCC)   2/16/2024 -  Cancer Staged    Staging form: Breast, AJCC 8th Edition  - Clinical stage from 2/16/2024: No Stage Recommended (ycT1b, cN0(sn), cM0, G2, ER+, WA+, HER2-)             Review of Systems;  CONSTITUTIONAL :  No fever, chills, fatigue, night sweats, or unintended weight loss  EYES: No discharge, itchiness, pain, redness  ENMT: No mouth sores, thrush, sore throat,  dysphagia, hoarseness of voice, tinnitus, congestion, rhinorrhea,dental issues, drooling  RESPIRATORY: No shortness of breath, or cough  CARDIOVASCULAR: No chest pain, palpitations  GASTROINTESTINAL: No nausea, vomiting, abdominal pain, diarrhea, constipation, melena or hematochezia.  GENITOURINARY: No dysuria, urinary frequency, hematuria  ENDOCRINE: No polydipsia, polyuria, cold or heat intolerance.  MUSCULOSKELETAL: No arthralgias, myalgias, or bony pain  INTEGUMENT.: No skin rash or bruises.  HEM/LYMPHATICS: No adenopathy, bruising or prolonged bleeding  NEURO: No headache, dizziness, syncope/presyncope, focal deficits or loss of balance.         Past Medical History:   Diagnosis Date    Allergic rhinitis     Allergic sinusitis 08/04/2021    Asthma without status asthmaticus 02/14/2023    Borderline hyperlipidemia 10/13/2022    Breast cancer (HCC) 2024    RIGHT IDC    Cancer (HCC) dx 2013    melanoma left hip (had excision, chemo but no radiation)    Chronic 
Chair/bed in low position, stretcher/bed with siderails up except when performing patient care activities  5.  Educate patient/family/caregiver on falls prevention  6.  Reassess in 12 weeks or with any noted change in patient condition which places them at a risk for a fall   4-6 Moderate Risk 1.  Provide assistance as indicated for ambulation activities  2.  Reorient confused/cognitively impaired patient  3.  Call-light/bell within patient's reach  4.  Chair/bed in low position, stretcher/bed with siderails up except when performing patient care activities  5.  Educate patient/family/caregiver on falls prevention  6.  Falls risk precaution (Yellow sticker Level II) placed on patient chart   7 or   Higher High Risk 1.  Place patient in easily observable treatment room  2.  Patient attended at all times by family member or staff  3.  Provide assistance as indicated for ambulation activities  4.  Reorient confused/cognitively impaired patient  5.  Call-light/bell within patient's reach  6.  Chair/bed in low position, stretcher/bed with siderails up except when performing patient care activities  7.  Educate patient/family/caregiver on falls prevention  8.  Falls risk precaution (Yellow sticker Level III) placed on patient chart           MALNUTRITION RISK SCREENING ASSESSMENT    Instructions:  Assess the patient and enter the appropriate indicators that are present for nutrition risk identification. Total the numbers entered and assign a risk score. Follow the appropriate action for total score listed below.     Assessment   Date  2/29/2024     Have you lost weight without trying?      0- No     Have you been eating poorly because of a decreased appetite?         0- No   3. Do you have a diagnosis of head and neck cancer OR will you be receiving neoadjuvant treatment for breast cancer?      0- No                                                                                    TOTAL 0        Score of 0-1: No 
The patient was then taken to the mammography suite for a post-procedure mammogram. POSTPROCEDURE MAMMOGRAM: ML and CC views of the right breast were obtained immediately following the procedure. The Mag seed is in good position.     Technically successful localization with Mag seed placement for right breast malignancy 12 o'clock position           ASSESSMENT     Invasive ductal carcinoma of the right breast, ER 95%, MN >95%, HER2-, Stage pT1b N0, Oncotype 6  Diagnosed on breast biopsy on 1/5/24  S/p lumpectomy and SLN on 2/7/24  Osteoporosis: Noted on DEXA from 11/23/21 02/29/2024: I am meeting the patient for the first time. She has early stage HR+/HER2- RIGHT-sided breast cancer, successfully had undergone lumpectomy. She is doing well without major issues. She was accompanied by her daughter today. Today, we discussed next steps. She still has yet to meet with radiation oncology. We also discussed role of endocrine therapy. Her last DEXA was on 11/23/21, noted osteoporosis, noting T score of -2.9 of the L-spine. T socre of left hip is -2.4. T score of fore arm is -4.6. She has been on Fosamax since then. She would be due for another DEXA.     PLAN     Taking Fosamax  Continue Vit D and Calcium  Ordering DEXA  Missed a call from rad onc about simulation. She will call back  Will meet with patient again to have further discussions on choice and start of ET depending on DEXA results  Plan to start ET about 2 weeks after completion of radiation    DISPO:   RTC 7 weeks      Thank you for allowing us to participate in the care of Kenya Bacon       Approximately spent 51 minutes on chart review as well as time spent on patient encounter, discussing the laboratory, imaging, and clinical findings; and documentation. I have discussed clinical implications and recommendations on the patient's primary issues. More than 50% of time was spent counseling patient. The patient verbalized understanding.      Dilshad PETTY

## 2024-03-05 ENCOUNTER — HOSPITAL ENCOUNTER (OUTPATIENT)
Dept: RADIATION ONCOLOGY | Age: 64
Discharge: HOME | End: 2024-03-05
Payer: COMMERCIAL

## 2024-03-05 PROCEDURE — 77334 RADIATION TREATMENT AID(S): CPT | Performed by: RADIOLOGY

## 2024-03-08 ENCOUNTER — HOSPITAL ENCOUNTER (OUTPATIENT)
Dept: RADIATION ONCOLOGY | Age: 64
Discharge: HOME | End: 2024-03-08
Payer: COMMERCIAL

## 2024-03-08 PROCEDURE — 77334 RADIATION TREATMENT AID(S): CPT | Performed by: RADIOLOGY

## 2024-03-08 PROCEDURE — 77295 3-D RADIOTHERAPY PLAN: CPT | Performed by: RADIOLOGY

## 2024-03-08 PROCEDURE — 77300 RADIATION THERAPY DOSE PLAN: CPT | Performed by: RADIOLOGY

## 2024-03-12 ENCOUNTER — TELEPHONE (OUTPATIENT)
Dept: CASE MANAGEMENT | Age: 64
End: 2024-03-12

## 2024-03-12 NOTE — TELEPHONE ENCOUNTER
Returned patient's call from earlier regarding her radiation therapy start date at [facility].  Informed her that her radiation plan is ready and approved to start, and the radiation therapist will be contacting her very soon (today or early tomorrow) to schedule.  She states that she has also received text messages about scheduling a test and she thought that it was her CT SIM for radiation.  Informed her that it was most likely radiation scheduling for her DEXA scan per Dr. Gregorio's 2/29/2024 order and it should be completed prior to her 4/24/2024 follow up appointment with him.  Verbalizes understanding and states that she will call them tomorrow to schedule.  Upon inquiring, states that she has the number via her messages.  Appreciative of call.  Denies any additional needs from NN at this time.  TASHIA Russo, BSW, RN, OCN  Oncology Nurse Navigator

## 2024-03-13 ENCOUNTER — HOSPITAL ENCOUNTER (OUTPATIENT)
Dept: RADIATION ONCOLOGY | Age: 64
Discharge: HOME | End: 2024-03-13
Payer: COMMERCIAL

## 2024-03-13 PROCEDURE — 77280 THER RAD SIMULAJ FIELD SMPL: CPT | Performed by: RADIOLOGY

## 2024-03-14 ENCOUNTER — HOSPITAL ENCOUNTER (OUTPATIENT)
Dept: RADIATION ONCOLOGY | Age: 64
Discharge: HOME | End: 2024-03-14
Payer: COMMERCIAL

## 2024-03-14 PROCEDURE — 77412 RADIATION TX DELIVERY LVL 3: CPT | Performed by: RADIOLOGY

## 2024-03-15 ENCOUNTER — HOSPITAL ENCOUNTER (OUTPATIENT)
Dept: RADIATION ONCOLOGY | Age: 64
Discharge: HOME | End: 2024-03-15
Payer: COMMERCIAL

## 2024-03-15 PROCEDURE — 77412 RADIATION TX DELIVERY LVL 3: CPT | Performed by: RADIOLOGY

## 2024-03-18 ENCOUNTER — HOSPITAL ENCOUNTER (OUTPATIENT)
Dept: RADIATION ONCOLOGY | Age: 64
Discharge: HOME | End: 2024-03-18
Payer: COMMERCIAL

## 2024-03-18 PROCEDURE — 77412 RADIATION TX DELIVERY LVL 3: CPT | Performed by: RADIOLOGY

## 2024-03-19 ENCOUNTER — HOSPITAL ENCOUNTER (OUTPATIENT)
Dept: RADIATION ONCOLOGY | Age: 64
Discharge: HOME | End: 2024-03-19
Payer: COMMERCIAL

## 2024-03-19 DIAGNOSIS — C50.919 MALIGNANT NEOPLASM OF FEMALE BREAST, UNSPECIFIED ESTROGEN RECEPTOR STATUS, UNSPECIFIED LATERALITY, UNSPECIFIED SITE OF BREAST (HCC): Primary | ICD-10-CM

## 2024-03-19 PROCEDURE — NBSRV NON-BILLABLE SERVICE: Performed by: RADIOLOGY

## 2024-03-19 PROCEDURE — 77412 RADIATION TX DELIVERY LVL 3: CPT | Performed by: RADIOLOGY

## 2024-03-19 NOTE — PATIENT INSTRUCTIONS
Continue daily fractionated radiation therapy as scheduled. Please see weekly OTV note and intial consultation letter in EPIC for clinical details.        Jesus Alberto Solis III, MD MS DABR    [facility]:  [phone]   FAX: [phone]  [facility]:  [phone]   FAX:    [phone]  [facility]:  [phone]   FAX:  [phone]  Email: [email or web address]

## 2024-03-19 NOTE — PROGRESS NOTES
DEPARTMENT OF RADIATION ONCOLOGY ON TREATMENT VISIT         3/19/2024      NAME:  Kenya Bacon    YOB: 1960    Diagnosis: breast cancer    SUBJECTIVE:   Kenya Bacon has now received fractionated external beam radiation therapy - ongoing.    Past medical, surgical, social and family histories reviewed and updated as indicated.    Pain: controlled    ALLERGIES:  Adhesive tape, Augmentin [amoxicillin-pot clavulanate], Bactrim [sulfamethoxazole-trimethoprim], Cipro [ciprofloxacin hcl], Doxycycline, Sulfa antibiotics, Tetracyclines & related, Vancomycin, and Iv dye [iodides]         Current Outpatient Medications   Medication Sig Dispense Refill    Ascorbic Acid (VITAMIN C GUMMIES PO) Take 1 gum by mouth as needed      Nutritional Supplements (FRUIT & VEGETABLE DAILY PO) Take 1 gum by mouth daily      NONFORMULARY Take 1 gum by mouth as needed (only takes it when she feels like it) Beet Root      budesonide-formoterol (BREYNA) 160-4.5 MCG/ACT AERO Inhale 2 puffs into the lungs 2 times daily      albuterol (PROVENTIL) (2.5 MG/3ML) 0.083% nebulizer solution Take 3 mLs by nebulization every 4 hours as needed for Wheezing 120 each 1    alendronate (FOSAMAX) 70 MG tablet Take 1 tablet by mouth every 7 days Take on empty stomach with full glass of water and stay upright for 1 hour 4 tablet 5    budesonide-formoterol (SYMBICORT) 160-4.5 MCG/ACT AERO Inhale 2 puffs by mouth twice daily 11 g 3    hydrocortisone 2.5 % ointment APPLY TO FLARES ON LIPS TWICE A DAY AS NEEDED      Cholecalciferol (VITAMIN D3) 997883 UNIT/GM POWD by Does not apply route once a week       No current facility-administered medications for this encounter.           OBJECTIVE:  Alert and fully ambulatory. Pleasant and conversant.      Physical Examination: General appearance - alert, well appearing, and in no distress.          Wt Readings from Last 3 Encounters:   02/29/24 87.8 kg (193 lb 8 oz)   02/27/24 87.1 kg (192 lb)

## 2024-03-20 ENCOUNTER — HOSPITAL ENCOUNTER (OUTPATIENT)
Dept: RADIATION ONCOLOGY | Age: 64
Discharge: HOME | End: 2024-03-20
Payer: COMMERCIAL

## 2024-03-20 PROCEDURE — 77336 RADIATION PHYSICS CONSULT: CPT | Performed by: RADIOLOGY

## 2024-03-20 PROCEDURE — 77412 RADIATION TX DELIVERY LVL 3: CPT | Performed by: RADIOLOGY

## 2024-03-20 PROCEDURE — 77427 RADIATION TX MANAGEMENT X5: CPT | Performed by: RADIOLOGY

## 2024-03-20 PROCEDURE — 77417 THER RADIOLOGY PORT IMAGE(S): CPT | Performed by: RADIOLOGY

## 2024-03-21 ENCOUNTER — HOSPITAL ENCOUNTER (OUTPATIENT)
Dept: RADIATION ONCOLOGY | Age: 64
Discharge: HOME | End: 2024-03-21
Payer: COMMERCIAL

## 2024-03-21 PROCEDURE — 77412 RADIATION TX DELIVERY LVL 3: CPT | Performed by: RADIOLOGY

## 2024-03-22 ENCOUNTER — HOSPITAL ENCOUNTER (OUTPATIENT)
Dept: RADIATION ONCOLOGY | Age: 64
Discharge: HOME | End: 2024-03-22
Payer: COMMERCIAL

## 2024-03-22 PROCEDURE — 77412 RADIATION TX DELIVERY LVL 3: CPT | Performed by: RADIOLOGY

## 2024-03-25 ENCOUNTER — APPOINTMENT (OUTPATIENT)
Dept: RADIATION ONCOLOGY | Age: 64
End: 2024-03-25
Payer: COMMERCIAL

## 2024-03-25 PROCEDURE — 77412 RADIATION TX DELIVERY LVL 3: CPT | Performed by: RADIOLOGY

## 2024-03-26 ENCOUNTER — TELEPHONE (OUTPATIENT)
Dept: CASE MANAGEMENT | Age: 64
End: 2024-03-26

## 2024-03-26 ENCOUNTER — APPOINTMENT (OUTPATIENT)
Dept: RADIATION ONCOLOGY | Age: 64
End: 2024-03-26
Payer: COMMERCIAL

## 2024-03-26 VITALS
SYSTOLIC BLOOD PRESSURE: 117 MMHG | WEIGHT: 190.2 LBS | RESPIRATION RATE: 18 BRPM | TEMPERATURE: 97.4 F | HEART RATE: 74 BPM | BODY MASS INDEX: 28.92 KG/M2 | DIASTOLIC BLOOD PRESSURE: 76 MMHG | OXYGEN SATURATION: 97 %

## 2024-03-26 DIAGNOSIS — C50.919 MALIGNANT NEOPLASM OF FEMALE BREAST, UNSPECIFIED ESTROGEN RECEPTOR STATUS, UNSPECIFIED LATERALITY, UNSPECIFIED SITE OF BREAST (HCC): Primary | ICD-10-CM

## 2024-03-26 PROCEDURE — 77307 TELETHX ISODOSE PLAN CPLX: CPT | Performed by: RADIOLOGY

## 2024-03-26 PROCEDURE — 77334 RADIATION TREATMENT AID(S): CPT | Performed by: RADIOLOGY

## 2024-03-26 PROCEDURE — 77412 RADIATION TX DELIVERY LVL 3: CPT | Performed by: RADIOLOGY

## 2024-03-26 PROCEDURE — NBSRV NON-BILLABLE SERVICE: Performed by: RADIOLOGY

## 2024-03-26 NOTE — PROGRESS NOTES
Kenya Bacon  3/26/2024  Wt Readings from Last 3 Encounters:   03/26/24 86.3 kg (190 lb 3.2 oz)   02/29/24 87.8 kg (193 lb 8 oz)   02/27/24 87.1 kg (192 lb)     Body mass index is 28.92 kg/m².        Treatment Area:CTV Rt breast    Patient was seen today for weekly visit.     Comfort Alteration  KPS:90%  Fatigue: Mild    Nutritional Alteration  Anorexia: No   Nausea: No   Vomiting: No     Skin Alteration   Sensation:yes    Radiation Dermatitis:  no    Mucous Membrane Alteration  Drainage: No  Lymphedema: No    Emotional  Coping: effective    Sexuality Alteration  na    Injury, potential bleeding or infection: no        Lab Results   Component Value Date    WBC 7.1 02/14/2024    HGB 14.1 02/14/2024    HCT 42.4 02/14/2024     02/14/2024         /76   Pulse 74   Temp 97.4 °F (36.3 °C) (Temporal)   Resp 18   Wt 86.3 kg (190 lb 3.2 oz)   SpO2 97%   BMI 28.92 kg/m²   BP within normal range? yes       Assessment/Plan: Pt completed 9/20fx and 2394/5256cGy.    Yasmine Charles RN

## 2024-03-26 NOTE — TELEPHONE ENCOUNTER
Met with patient briefly following her daily radiation therapy treatment for follow up.  Patient has had 9 treatments so far.  Upon inquiring, states that she is doing well with the treatments besides some mild fatigue.  Denies any problems with transportation for treatment or current needs for assistance from .  Patient is scheduled for her DEXA scan 4/12/2024 at [facility] prior to Dr. Gregorio's 4/24/2024 appt at [facility].  Patient appreciative of visit and aware to contact if needed.  Will continue to follow.  TSAHIA Russo, BSW, RN, OCN  Oncology Nurse Navigator

## 2024-03-26 NOTE — PROGRESS NOTES
DEPARTMENT OF RADIATION ONCOLOGY ON TREATMENT VISIT         3/26/2024      NAME:  Kenya Bacon    YOB: 1960    Diagnosis: breast cancer    SUBJECTIVE:   Kenya Bacon has now received fractionated external beam radiation therapy - ongoing.    Past medical, surgical, social and family histories reviewed and updated as indicated.    Pain: controlled    ALLERGIES:  Adhesive tape, Augmentin [amoxicillin-pot clavulanate], Bactrim [sulfamethoxazole-trimethoprim], Cipro [ciprofloxacin hcl], Doxycycline, Sulfa antibiotics, Tetracyclines & related, Vancomycin, and Iv dye [iodides]         Current Outpatient Medications   Medication Sig Dispense Refill    Ascorbic Acid (VITAMIN C GUMMIES PO) Take 1 gum by mouth as needed      Nutritional Supplements (FRUIT & VEGETABLE DAILY PO) Take 1 gum by mouth daily      NONFORMULARY Take 1 gum by mouth as needed (only takes it when she feels like it) Beet Root      budesonide-formoterol (BREYNA) 160-4.5 MCG/ACT AERO Inhale 2 puffs into the lungs 2 times daily      albuterol (PROVENTIL) (2.5 MG/3ML) 0.083% nebulizer solution Take 3 mLs by nebulization every 4 hours as needed for Wheezing 120 each 1    alendronate (FOSAMAX) 70 MG tablet Take 1 tablet by mouth every 7 days Take on empty stomach with full glass of water and stay upright for 1 hour 4 tablet 5    budesonide-formoterol (SYMBICORT) 160-4.5 MCG/ACT AERO Inhale 2 puffs by mouth twice daily 11 g 3    hydrocortisone 2.5 % ointment APPLY TO FLARES ON LIPS TWICE A DAY AS NEEDED      Cholecalciferol (VITAMIN D3) 001057 UNIT/GM POWD by Does not apply route once a week       No current facility-administered medications for this encounter.           OBJECTIVE:  Alert and fully ambulatory. Pleasant and conversant.        Physical Examination: General appearance - alert, well appearing, and in no distress.          Wt Readings from Last 3 Encounters:   03/26/24 86.3 kg (190 lb 3.2 oz)   02/29/24 87.8 kg (193 lb 8

## 2024-03-27 ENCOUNTER — APPOINTMENT (OUTPATIENT)
Dept: RADIATION ONCOLOGY | Age: 64
End: 2024-03-27
Payer: COMMERCIAL

## 2024-03-27 PROCEDURE — 77412 RADIATION TX DELIVERY LVL 3: CPT | Performed by: RADIOLOGY

## 2024-03-27 PROCEDURE — 77427 RADIATION TX MANAGEMENT X5: CPT | Performed by: RADIOLOGY

## 2024-03-27 PROCEDURE — 77336 RADIATION PHYSICS CONSULT: CPT | Performed by: RADIOLOGY

## 2024-03-28 ENCOUNTER — APPOINTMENT (OUTPATIENT)
Dept: RADIATION ONCOLOGY | Age: 64
End: 2024-03-28
Payer: COMMERCIAL

## 2024-03-28 PROCEDURE — 77412 RADIATION TX DELIVERY LVL 3: CPT | Performed by: RADIOLOGY

## 2024-03-29 ENCOUNTER — APPOINTMENT (OUTPATIENT)
Dept: RADIATION ONCOLOGY | Age: 64
End: 2024-03-29
Payer: COMMERCIAL

## 2024-03-29 PROCEDURE — 77412 RADIATION TX DELIVERY LVL 3: CPT | Performed by: RADIOLOGY

## 2024-04-01 ENCOUNTER — HOSPITAL ENCOUNTER (OUTPATIENT)
Dept: RADIATION ONCOLOGY | Age: 64
Discharge: HOME | End: 2024-04-01
Payer: COMMERCIAL

## 2024-04-01 PROCEDURE — 77412 RADIATION TX DELIVERY LVL 3: CPT | Performed by: RADIOLOGY

## 2024-04-02 ENCOUNTER — HOSPITAL ENCOUNTER (OUTPATIENT)
Dept: RADIATION ONCOLOGY | Age: 64
Discharge: HOME | End: 2024-04-02
Payer: COMMERCIAL

## 2024-04-02 VITALS
RESPIRATION RATE: 18 BRPM | SYSTOLIC BLOOD PRESSURE: 123 MMHG | HEART RATE: 87 BPM | OXYGEN SATURATION: 97 % | WEIGHT: 190 LBS | DIASTOLIC BLOOD PRESSURE: 70 MMHG | BODY MASS INDEX: 28.89 KG/M2 | TEMPERATURE: 97.3 F

## 2024-04-02 PROCEDURE — 77412 RADIATION TX DELIVERY LVL 3: CPT | Performed by: RADIOLOGY

## 2024-04-02 NOTE — PROGRESS NOTES
Kenya Bacon  4/2/2024  Wt Readings from Last 3 Encounters:   04/02/24 86.2 kg (190 lb)   03/26/24 86.3 kg (190 lb 3.2 oz)   02/29/24 87.8 kg (193 lb 8 oz)     Body mass index is 28.89 kg/m².        Treatment Area:Right Breast, tb    Patient was seen today for weekly visit.     Comfort Alteration  KPS:80%  Fatigue: Mild    Nutritional Alteration  Anorexia: No   Nausea: No   Vomiting: No     Skin Alteration   Sensation:Red and itchy    Radiation Dermatitis:  yes    Mucous Membrane Alteration  Drainage: No  Lymphedema: No    Emotional  Coping: effective    Sexuality Alteration  na    Injury, potential bleeding or infection: no        Lab Results   Component Value Date    WBC 7.1 02/14/2024    HGB 14.1 02/14/2024    HCT 42.4 02/14/2024     02/14/2024         /70   Pulse 87   Temp 97.3 °F (36.3 °C) (Temporal)   Resp 18   Wt 86.2 kg (190 lb)   SpO2 97%   BMI 28.89 kg/m²   BP within normal range? yes       Assessment/Plan:14/20fx;3724/5256cGy completed.    Janine Rodríguez RN

## 2024-04-02 NOTE — PROGRESS NOTES
DEPARTMENT OF RADIATION ONCOLOGY   ON TREATMENT VISIT       4/2/2024      NAME:  Kenya Bacon    YOB: 1960    DIAGNOSIS:     STAGING: Cancer Staging   Invasive ductal carcinoma of right breast (HCC)  Staging form: Breast, AJCC 8th Edition  - Clinical stage from 2/16/2024: No Stage Recommended (ycT1b, cN0(sn), cM0, G2, ER+, NM+, HER2-) - Signed by Jermaine Uriarte MD on 2/16/2024      SUBJECTIVE:   Kenya Bacon has now received 3724 cGy in 14 266-cGy daily fractions directed to the r breast for management of the above diagnosis.    Doing well overall  Biggest c/o is itch from radiation dermatitis mostly UIQ which should be done after 2 more fractions      Past medical, surgical, social and family histories reviewed and updated as indicated.    PAIN: no    ALLERGIES:  Adhesive tape, Augmentin [amoxicillin-pot clavulanate], Bactrim [sulfamethoxazole-trimethoprim], Cipro [ciprofloxacin hcl], Doxycycline, Sulfa antibiotics, Tetracyclines & related, Vancomycin, and Iv dye [iodides]         Current Outpatient Medications   Medication Sig Dispense Refill    Ascorbic Acid (VITAMIN C GUMMIES PO) Take 1 gum by mouth as needed      Nutritional Supplements (FRUIT & VEGETABLE DAILY PO) Take 1 gum by mouth daily      NONFORMULARY Take 1 gum by mouth as needed (only takes it when she feels like it) Beet Root      budesonide-formoterol (BREYNA) 160-4.5 MCG/ACT AERO Inhale 2 puffs into the lungs 2 times daily      albuterol (PROVENTIL) (2.5 MG/3ML) 0.083% nebulizer solution Take 3 mLs by nebulization every 4 hours as needed for Wheezing 120 each 1    alendronate (FOSAMAX) 70 MG tablet Take 1 tablet by mouth every 7 days Take on empty stomach with full glass of water and stay upright for 1 hour 4 tablet 5    budesonide-formoterol (SYMBICORT) 160-4.5 MCG/ACT AERO Inhale 2 puffs by mouth twice daily 11 g 3    hydrocortisone 2.5 % ointment APPLY TO FLARES ON LIPS TWICE A DAY AS NEEDED      Cholecalciferol

## 2024-04-03 ENCOUNTER — HOSPITAL ENCOUNTER (OUTPATIENT)
Dept: RADIATION ONCOLOGY | Age: 64
Discharge: HOME | End: 2024-04-03
Payer: COMMERCIAL

## 2024-04-03 PROCEDURE — 77280 THER RAD SIMULAJ FIELD SMPL: CPT | Performed by: RADIOLOGY

## 2024-04-03 PROCEDURE — 77412 RADIATION TX DELIVERY LVL 3: CPT | Performed by: RADIOLOGY

## 2024-04-03 PROCEDURE — 77336 RADIATION PHYSICS CONSULT: CPT | Performed by: RADIOLOGY

## 2024-04-04 ENCOUNTER — HOSPITAL ENCOUNTER (OUTPATIENT)
Dept: RADIATION ONCOLOGY | Age: 64
Discharge: HOME | End: 2024-04-04
Payer: COMMERCIAL

## 2024-04-04 PROCEDURE — 77412 RADIATION TX DELIVERY LVL 3: CPT | Performed by: RADIOLOGY

## 2024-04-05 ENCOUNTER — HOSPITAL ENCOUNTER (OUTPATIENT)
Dept: RADIATION ONCOLOGY | Age: 64
Discharge: HOME | End: 2024-04-05
Payer: COMMERCIAL

## 2024-04-05 PROCEDURE — 77412 RADIATION TX DELIVERY LVL 3: CPT | Performed by: RADIOLOGY

## 2024-04-08 ENCOUNTER — HOSPITAL ENCOUNTER (OUTPATIENT)
Dept: RADIATION ONCOLOGY | Age: 64
Discharge: HOME | End: 2024-04-08
Payer: COMMERCIAL

## 2024-04-08 PROCEDURE — 77412 RADIATION TX DELIVERY LVL 3: CPT | Performed by: RADIOLOGY

## 2024-04-09 ENCOUNTER — HOSPITAL ENCOUNTER (OUTPATIENT)
Dept: RADIATION ONCOLOGY | Age: 64
Discharge: HOME | End: 2024-04-09
Payer: COMMERCIAL

## 2024-04-09 VITALS
WEIGHT: 190.6 LBS | OXYGEN SATURATION: 98 % | BODY MASS INDEX: 28.98 KG/M2 | RESPIRATION RATE: 18 BRPM | HEART RATE: 100 BPM | TEMPERATURE: 97.2 F | DIASTOLIC BLOOD PRESSURE: 66 MMHG | SYSTOLIC BLOOD PRESSURE: 112 MMHG

## 2024-04-09 DIAGNOSIS — C50.919 MALIGNANT NEOPLASM OF FEMALE BREAST, UNSPECIFIED ESTROGEN RECEPTOR STATUS, UNSPECIFIED LATERALITY, UNSPECIFIED SITE OF BREAST (HCC): Primary | ICD-10-CM

## 2024-04-09 PROCEDURE — 77412 RADIATION TX DELIVERY LVL 3: CPT | Performed by: RADIOLOGY

## 2024-04-09 ASSESSMENT — PAIN DESCRIPTION - LOCATION: LOCATION: BREAST

## 2024-04-09 ASSESSMENT — PAIN SCALES - GENERAL: PAINLEVEL_OUTOF10: 4

## 2024-04-09 ASSESSMENT — PAIN DESCRIPTION - DESCRIPTORS: DESCRIPTORS: SORE;BURNING

## 2024-04-09 ASSESSMENT — PAIN DESCRIPTION - ORIENTATION: ORIENTATION: RIGHT

## 2024-04-09 ASSESSMENT — PAIN DESCRIPTION - FREQUENCY: FREQUENCY: INTERMITTENT

## 2024-04-09 NOTE — PROGRESS NOTES
Kenya Bacon  4/9/2024  Wt Readings from Last 3 Encounters:   04/09/24 86.5 kg (190 lb 9.6 oz)   04/02/24 86.2 kg (190 lb)   03/26/24 86.3 kg (190 lb 3.2 oz)     Body mass index is 28.98 kg/m².        Treatment Area:CTV Right Breast, tb    Patient was seen today for weekly visit.     Comfort Alteration  KPS:80%  Fatigue: Mild    Nutritional Alteration  Anorexia: No   Nausea: Yes  Vomiting: Yes , Saturday x1    Skin Alteration   Sensation:peeling under right armpit    Radiation Dermatitis:  yes    Mucous Membrane Alteration  Drainage: No  Lymphedema: No    Emotional  Coping: effective    Sexuality Alteration  na    Injury, potential bleeding or infection: no        Lab Results   Component Value Date    WBC 7.1 02/14/2024    HGB 14.1 02/14/2024    HCT 42.4 02/14/2024     02/14/2024         /66   Pulse 100   Temp 97.2 °F (36.2 °C) (Temporal)   Resp 18   Wt 86.5 kg (190 lb 9.6 oz)   SpO2 98%   BMI 28.98 kg/m²   BP within normal range? yes       Assessment/Plan:19/20fx;5006/5256cGy completed.    Janine Rodríguez RN

## 2024-04-09 NOTE — PROGRESS NOTES
DEPARTMENT OF RADIATION ONCOLOGY ON TREATMENT VISIT         4/9/2024      NAME:  Kenya Bacon    YOB: 1960    Diagnosis: breast cancer    SUBJECTIVE:   Kenya Bacon has now received fractionated external beam radiation therapy - ongoing.    Past medical, surgical, social and family histories reviewed and updated as indicated.    Pain: controlled    ALLERGIES:  Adhesive tape, Augmentin [amoxicillin-pot clavulanate], Bactrim [sulfamethoxazole-trimethoprim], Cipro [ciprofloxacin hcl], Doxycycline, Sulfa antibiotics, Tetracyclines & related, Vancomycin, and Iv dye [iodides]         Current Outpatient Medications   Medication Sig Dispense Refill    Ascorbic Acid (VITAMIN C GUMMIES PO) Take 1 gum by mouth as needed      Nutritional Supplements (FRUIT & VEGETABLE DAILY PO) Take 1 gum by mouth daily      NONFORMULARY Take 1 gum by mouth as needed (only takes it when she feels like it) Beet Root      budesonide-formoterol (BREYNA) 160-4.5 MCG/ACT AERO Inhale 2 puffs into the lungs 2 times daily      albuterol (PROVENTIL) (2.5 MG/3ML) 0.083% nebulizer solution Take 3 mLs by nebulization every 4 hours as needed for Wheezing 120 each 1    alendronate (FOSAMAX) 70 MG tablet Take 1 tablet by mouth every 7 days Take on empty stomach with full glass of water and stay upright for 1 hour 4 tablet 5    budesonide-formoterol (SYMBICORT) 160-4.5 MCG/ACT AERO Inhale 2 puffs by mouth twice daily 11 g 3    hydrocortisone 2.5 % ointment APPLY TO FLARES ON LIPS TWICE A DAY AS NEEDED      Cholecalciferol (VITAMIN D3) 746233 UNIT/GM POWD by Does not apply route once a week       No current facility-administered medications for this encounter.         OBJECTIVE:  Alert and fully ambulatory. Pleasant and conversant.      Physical Examination: General appearance - alert, well appearing, and in no distress.          Wt Readings from Last 3 Encounters:   04/09/24 86.5 kg (190 lb 9.6 oz)   04/02/24 86.2 kg (190 lb)

## 2024-04-09 NOTE — PATIENT INSTRUCTIONS
Continue daily fractionated radiation therapy as scheduled. Please see weekly OTV note and intial consultation letter in EPIC for clinical details.        Jesus Alberto Solis III, MD MS DABR    [facility]:  [phone]   FAX: [phone]  [facility]:  [phone]   FAX:    [phone]  [facility]:  [phone]   FAX:  [phone]  Email: [email or web address]

## 2024-04-10 ENCOUNTER — HOSPITAL ENCOUNTER (OUTPATIENT)
Dept: RADIATION ONCOLOGY | Age: 64
Discharge: HOME | End: 2024-04-10
Payer: COMMERCIAL

## 2024-04-10 PROCEDURE — 77336 RADIATION PHYSICS CONSULT: CPT | Performed by: RADIOLOGY

## 2024-04-10 PROCEDURE — 77412 RADIATION TX DELIVERY LVL 3: CPT | Performed by: RADIOLOGY

## 2024-04-10 PROCEDURE — 77427 RADIATION TX MANAGEMENT X5: CPT | Performed by: RADIOLOGY

## 2024-04-12 ENCOUNTER — HOSPITAL ENCOUNTER (OUTPATIENT)
Dept: GENERAL RADIOLOGY | Age: 64
Discharge: HOME | End: 2024-04-12
Payer: COMMERCIAL

## 2024-04-12 DIAGNOSIS — Z79.811 PREVENTATIVE USE OF AROMATASE INHIBITORS: ICD-10-CM

## 2024-04-12 PROCEDURE — 77080 DXA BONE DENSITY AXIAL: CPT

## 2024-04-17 RX ORDER — BUDESONIDE AND FORMOTEROL FUMARATE DIHYDRATE 160; 4.5 UG/1; UG/1
AEROSOL RESPIRATORY (INHALATION)
Qty: 10.2 G | Refills: 0 | OUTPATIENT
Start: 2024-04-17

## 2024-04-18 RX ORDER — BUDESONIDE AND FORMOTEROL FUMARATE DIHYDRATE 160; 4.5 UG/1; UG/1
AEROSOL RESPIRATORY (INHALATION)
Qty: 10.2 G | Refills: 0 | OUTPATIENT
Start: 2024-04-18

## 2024-04-24 ENCOUNTER — OFFICE VISIT (OUTPATIENT)
Dept: ONCOLOGY | Age: 64
End: 2024-04-24
Payer: COMMERCIAL

## 2024-04-24 VITALS
DIASTOLIC BLOOD PRESSURE: 87 MMHG | WEIGHT: 192.2 LBS | HEIGHT: 68 IN | TEMPERATURE: 97.7 F | SYSTOLIC BLOOD PRESSURE: 127 MMHG | OXYGEN SATURATION: 96 % | HEART RATE: 84 BPM | BODY MASS INDEX: 29.13 KG/M2

## 2024-04-24 DIAGNOSIS — C50.911 INVASIVE DUCTAL CARCINOMA OF RIGHT BREAST (HCC): Primary | ICD-10-CM

## 2024-04-24 PROCEDURE — 99212 OFFICE O/P EST SF 10 MIN: CPT

## 2024-04-24 PROCEDURE — 99213 OFFICE O/P EST LOW 20 MIN: CPT | Performed by: STUDENT IN AN ORGANIZED HEALTH CARE EDUCATION/TRAINING PROGRAM

## 2024-04-24 RX ORDER — ANASTROZOLE 1 MG/1
1 TABLET ORAL DAILY
Qty: 30 TABLET | Refills: 0 | Status: SHIPPED | OUTPATIENT
Start: 2024-04-24

## 2024-04-24 NOTE — PROGRESS NOTES
[facility] [place] [facility] MEDICAL ONCOLOGY  [address] [place] [address]  Dept: [phone]  Loc: [phone]    Clinic Consultation Note      Date of Encounter: 04/24/2024     Referring Provider:  Jermaine Uriarte MD    Reason for Visit:   RIGHT IDC of the breast        PCP:  Alley Cruz, APRN - CNP    Demographics: 64 y.o. female    Chief Complaint   Patient presents with    Breast Cancer         Subjective:  Completed radiation.   C/o pain/discomfort of right breast where radiation was delivered.     HPI from Initial Outpatient Consultation  (02/29/2024):  The patient is a 63 y.o. female comes in for right sided early stage breast cancer. History includes mammogram done on 10/24/23 noting a spiculated mass on the central right breast, further evaluated with Breast ultrasound done on 12/18/23 revealing a 1.1 x 1 cm breast mass located at the 12 o'clock positive , about 3 cm from the nipple, with BIRADS 4C category. There was also mention of a right axillary LN, 2.3 1.7 x 1 cm in size.     Her diagnosis was confirmed on biopsy on 1/5/24, and then underwent lumpectomy/SLN on 2/7/24.     Today, she was accompanied by her daughter. No major issues. She is recovering from her surgery well.       Oncology History   Invasive ductal carcinoma of right breast (HCC)   2/16/2024 -  Cancer Staged    Staging form: Breast, AJCC 8th Edition  - Clinical stage from 2/16/2024: No Stage Recommended (ycT1b, cN0(sn), cM0, G2, ER+, OR+, HER2-)     Malignant neoplasm of right breast (HCC)   1/5/2024 Biopsy    -- Diagnosis --   Right breast, 12:00 core needle biopsy: Invasive ductal carcinoma   (grade 2)     -- Diagnosis Comment --   The tumor cells are immunoreactive with GATA3.  The p63 immunostain   shows an absence of myoepithelial cells which supports the   interpretation of invasive carcinoma.  Intradepartmental consultation   is obtained.     Breast Cancer Marker Studies:      Estrogen

## 2024-04-26 ENCOUNTER — TELEPHONE (OUTPATIENT)
Dept: ONCOLOGY | Age: 64
End: 2024-04-26

## 2024-04-26 DIAGNOSIS — J43.8 OTHER EMPHYSEMA (HCC): ICD-10-CM

## 2024-04-26 NOTE — TELEPHONE ENCOUNTER
04/26/2024 2:30 pm  Nia called and left a vm stating that she has decided not to take \"the 5 year medicine for after radiation therapy. She doesn't think she can handle it.\"

## 2024-04-29 RX ORDER — BUDESONIDE AND FORMOTEROL FUMARATE DIHYDRATE 160; 4.5 UG/1; UG/1
2 AEROSOL RESPIRATORY (INHALATION) 2 TIMES DAILY
Qty: 11 G | Refills: 3 | Status: SHIPPED | OUTPATIENT
Start: 2024-04-29

## 2024-04-29 NOTE — TELEPHONE ENCOUNTER
Spoke to the pt she stated the Breyan does not work for her . She wants the symbicort. Please advise.

## 2024-05-04 ENCOUNTER — HOSPITAL ENCOUNTER (EMERGENCY)
Age: 64
Discharge: HOME | End: 2024-05-04

## 2024-05-07 ENCOUNTER — TELEPHONE (OUTPATIENT)
Dept: CASE MANAGEMENT | Age: 64
End: 2024-05-07

## 2024-05-07 ENCOUNTER — OFFICE VISIT (OUTPATIENT)
Dept: FAMILY MEDICINE CLINIC | Age: 64
End: 2024-05-07
Payer: COMMERCIAL

## 2024-05-07 VITALS
TEMPERATURE: 97.4 F | DIASTOLIC BLOOD PRESSURE: 90 MMHG | RESPIRATION RATE: 18 BRPM | WEIGHT: 192 LBS | BODY MASS INDEX: 29.1 KG/M2 | HEART RATE: 100 BPM | SYSTOLIC BLOOD PRESSURE: 140 MMHG | OXYGEN SATURATION: 98 % | HEIGHT: 68 IN

## 2024-05-07 DIAGNOSIS — R39.9 UTI SYMPTOMS: Primary | ICD-10-CM

## 2024-05-07 DIAGNOSIS — J40 BRONCHITIS: ICD-10-CM

## 2024-05-07 LAB
BILIRUBIN, POC: NORMAL
BLOOD URINE, POC: NORMAL
CLARITY, POC: CLEAR
COLOR, POC: YELLOW
GLUCOSE URINE, POC: NORMAL
KETONES, POC: NORMAL
LEUKOCYTE EST, POC: NORMAL
NITRITE, POC: NORMAL
PH, POC: 6
PROTEIN, POC: NORMAL
SPECIFIC GRAVITY, POC: 1.03
UROBILINOGEN, POC: 1

## 2024-05-07 PROCEDURE — 81002 URINALYSIS NONAUTO W/O SCOPE: CPT

## 2024-05-07 PROCEDURE — 99213 OFFICE O/P EST LOW 20 MIN: CPT

## 2024-05-07 RX ORDER — CEFDINIR 300 MG/1
300 CAPSULE ORAL 2 TIMES DAILY
Qty: 20 CAPSULE | Refills: 0 | Status: SHIPPED | OUTPATIENT
Start: 2024-05-07 | End: 2024-05-17

## 2024-05-07 RX ORDER — PREDNISONE 20 MG/1
20 TABLET ORAL 3 TIMES DAILY
Qty: 15 TABLET | Refills: 0 | Status: SHIPPED | OUTPATIENT
Start: 2024-05-07 | End: 2024-05-12

## 2024-05-07 RX ORDER — BROMPHENIRAMINE MALEATE, PSEUDOEPHEDRINE HYDROCHLORIDE, AND DEXTROMETHORPHAN HYDROBROMIDE 2; 30; 10 MG/5ML; MG/5ML; MG/5ML
SYRUP ORAL
Qty: 180 ML | Refills: 0 | Status: SHIPPED | OUTPATIENT
Start: 2024-05-07

## 2024-05-07 NOTE — PROGRESS NOTES
24  Kenya Bacon : 1960 Sex: female  Age 64 y.o.    Subjective:  Chief Complaint   Patient presents with    Urinary Tract Infection     Frequency with little out put, pressure, cough, chest congestion, sinus drainage and congestion        HPI:   Kenya Bacon , 64 y.o. female presents to the clinic for evaluation of urinary frequency and pressure x 6 days. The patient also reports sinus pressure, cough, chest congestion and sinus drainage and congestion. The patient has taken Nyquil for symptoms. The patient reports worsening symptoms over time. The patient has ill exposure. The patient denies hx of COVID-19. The patient denies acute loss of taste and smell, headache, sore throat, rash, and fever. The patient also denies chest pain, abdominal pain, shortness of breath, wheezing, and nausea / vomiting / diarrhea.    ROS:   Unless otherwise stated in this report the patient's positive and negative responses for review of systems for constitutional, eyes, ENT, cardiovascular, respiratory, gastrointestinal, neurological, , musculoskeletal, and integument systems and related systems to the presenting problem are either stated in the history of present illness or were not pertinent or were negative for the symptoms and/or complaints related to the presenting medical problem.  Positives and pertinent negatives as per HPI.  All others reviewed and are negative.      PMH:     Past Medical History:   Diagnosis Date    Allergic rhinitis     Allergic sinusitis 2021    Asthma without status asthmaticus 2023    Borderline hyperlipidemia 10/13/2022    Breast cancer ([facility])     RIGHT IDC    Cancer ([facility]) dx     melanoma left hip (had excision, chemo but no radiation)    Chronic midline low back pain with left-sided sciatica 2023    COPD (chronic obstructive pulmonary disease) ([facility])     states cant afford her symbicort inhaler    Depression     Dysuria 2021    Emphysema of lung

## 2024-05-07 NOTE — TELEPHONE ENCOUNTER
Prepared patient's Survivorship Care Plan and Treatment Summary for her follow up appointment with Vanna Good CNP at [facility] on 5/13/2024.  Copy sent to patient's PCP.  Folder prepared with additional written literature.  TASHIA Russo, BSW, RN, OCN  Oncology Nurse Navigator

## 2024-05-09 LAB
CULTURE: ABNORMAL
SPECIMEN DESCRIPTION: ABNORMAL

## 2024-05-13 ENCOUNTER — HOSPITAL ENCOUNTER (OUTPATIENT)
Dept: RADIATION ONCOLOGY | Age: 64
Discharge: HOME | End: 2024-05-13

## 2024-05-13 VITALS
TEMPERATURE: 97.2 F | HEART RATE: 85 BPM | BODY MASS INDEX: 27.62 KG/M2 | OXYGEN SATURATION: 97 % | WEIGHT: 181.6 LBS | RESPIRATION RATE: 18 BRPM

## 2024-05-13 DIAGNOSIS — C50.111 MALIGNANT NEOPLASM OF CENTRAL PORTION OF RIGHT BREAST IN FEMALE, ESTROGEN RECEPTOR POSITIVE (HCC): Primary | ICD-10-CM

## 2024-05-13 DIAGNOSIS — Z17.0 MALIGNANT NEOPLASM OF CENTRAL PORTION OF RIGHT BREAST IN FEMALE, ESTROGEN RECEPTOR POSITIVE (HCC): Primary | ICD-10-CM

## 2024-05-13 PROCEDURE — 99024 POSTOP FOLLOW-UP VISIT: CPT | Performed by: NURSE PRACTITIONER

## 2024-05-13 NOTE — PROGRESS NOTES
RADIATION ONCOLOGY- [facility]  4 week follow up       05/13/2024      NAME:  Kenya Bacon    YOB: 1960    Diagnosis:  Right breast cancer.     Subjective:  On 04/10/2024, Kenya Bacon completed 5256 cGy in 20 fractions directed to the R breast + TB.     The patient is seen today in 4 week post Radiation completion symptom management check. The patient reports skin reaction developed near the end of treatment worse for 1-2 weeks following completion. Describes intense burning sensation, now healed/ resolved. She continue to use Phys Assist Oncology cream and Calendula to skin at treatment site. The patient complains of post treatment fatigue, reports is slowly improving (less than before). The patient reports recently diagnosed with bronchitis and UTI, seen at outpatient [facility], Rx antibiotics. The patient report sore, achy hips, thinks she over did it walking and climbing steps. No fevers or chills.     The patient recommended for adjuvant endocrine therapy with Anastrozole per Medical Oncology. The patient states after reading the potential side effects of the medication she has decided not to take it.        Patient is following with:    Medical Oncology- Dr. Gregorio. 05/22/2024.     Breast Surgery-  Dr. Uriarte. Next visit 02/17/2025.     Primary Care- BERYL Westfall-CNP. Next visit 05/20/2024.     Pain: Both hips.     Past medical, surgical, social and family histories reviewed and updated as indicated.    ALLERGIES:  Adhesive tape, Augmentin [amoxicillin-pot clavulanate], Bactrim [sulfamethoxazole-trimethoprim], Cipro [ciprofloxacin hcl], Doxycycline, Sulfa antibiotics, Tetracyclines & related, Vancomycin, and Iv dye [iodides]         ### Patient not taking Arimidex.     Current Outpatient Medications   Medication Sig Dispense Refill    cefdinir (OMNICEF) 300 MG capsule Take 1 capsule by mouth 2 times daily for 10 days 20 capsule 0    brompheniramine-pseudoephedrine-DM 2-30-10 MG/5ML

## 2024-05-13 NOTE — PROGRESS NOTES
Kenya Bacon  5/13/2024  10:14 AM      Vitals:    05/13/24 1008   Pulse: 85   Resp: 18   Temp: 97.2 °F (36.2 °C)   SpO2: 97%    :    Wt Readings from Last 3 Encounters:   05/13/24 82.4 kg (181 lb 9.6 oz)   05/07/24 87.1 kg (192 lb)   04/24/24 87.2 kg (192 lb 3.2 oz)                Current Outpatient Medications:     cefdinir (OMNICEF) 300 MG capsule, Take 1 capsule by mouth 2 times daily for 10 days, Disp: 20 capsule, Rfl: 0    brompheniramine-pseudoephedrine-DM 2-30-10 MG/5ML syrup, 5 - 10 mL by mouth every 6 hours as needed for cough / congestion., Disp: 180 mL, Rfl: 0    budesonide-formoterol (SYMBICORT) 160-4.5 MCG/ACT AERO, Inhale 2 puffs into the lungs 2 times daily, Disp: 11 g, Rfl: 3    anastrozole (ARIMIDEX) 1 MG tablet, Take 1 tablet by mouth daily, Disp: 30 tablet, Rfl: 0    Ascorbic Acid (VITAMIN C GUMMIES PO), Take 1 gum by mouth as needed, Disp: , Rfl:     Nutritional Supplements (FRUIT & VEGETABLE DAILY PO), Take 1 gum by mouth daily, Disp: , Rfl:     NONFORMULARY, Take 1 gum by mouth as needed (only takes it when she feels like it) Beet Root, Disp: , Rfl:     albuterol (PROVENTIL) (2.5 MG/3ML) 0.083% nebulizer solution, Take 3 mLs by nebulization every 4 hours as needed for Wheezing, Disp: 120 each, Rfl: 1    alendronate (FOSAMAX) 70 MG tablet, Take 1 tablet by mouth every 7 days Take on empty stomach with full glass of water and stay upright for 1 hour, Disp: 4 tablet, Rfl: 5    hydrocortisone 2.5 % ointment, APPLY TO FLARES ON LIPS TWICE A DAY AS NEEDED, Disp: , Rfl:     Cholecalciferol (VITAMIN D3) 757777 UNIT/GM POWD, by Does not apply route once a week, Disp: , Rfl:       Patient is seen today in follow up for RT to her right breast for her right breast IDC, ER/KY+, HER-2- breast cancer. Patient received RT to her Right Breast and tb from 03/14/23-04/10/24, completing 20fx;5256cGy. Patient denies any pain or skin issues to the irradiated skin area of her right breast at this time. Patient

## 2024-05-15 NOTE — ADDENDUM NOTE
Encounter addended by: Vanna Good APRN - CNP on: 5/15/2024 9:25 AM   Actions taken: Clinical Note Signed

## 2024-05-20 ENCOUNTER — OFFICE VISIT (OUTPATIENT)
Dept: FAMILY MEDICINE CLINIC | Age: 64
End: 2024-05-20
Payer: COMMERCIAL

## 2024-05-20 VITALS
RESPIRATION RATE: 16 BRPM | DIASTOLIC BLOOD PRESSURE: 60 MMHG | HEART RATE: 90 BPM | BODY MASS INDEX: 26.83 KG/M2 | TEMPERATURE: 97.5 F | OXYGEN SATURATION: 97 % | WEIGHT: 177 LBS | HEIGHT: 68 IN | SYSTOLIC BLOOD PRESSURE: 112 MMHG

## 2024-05-20 DIAGNOSIS — J45.40 MODERATE PERSISTENT ASTHMA WITHOUT STATUS ASTHMATICUS WITHOUT COMPLICATION: Primary | ICD-10-CM

## 2024-05-20 DIAGNOSIS — Z87.891 PERSONAL HISTORY OF TOBACCO USE: ICD-10-CM

## 2024-05-20 DIAGNOSIS — J43.8 OTHER EMPHYSEMA (HCC): ICD-10-CM

## 2024-05-20 DIAGNOSIS — B37.31 VAGINAL CANDIDA: ICD-10-CM

## 2024-05-20 PROCEDURE — 99214 OFFICE O/P EST MOD 30 MIN: CPT | Performed by: NURSE PRACTITIONER

## 2024-05-20 PROCEDURE — G0296 VISIT TO DETERM LDCT ELIG: HCPCS | Performed by: NURSE PRACTITIONER

## 2024-05-20 RX ORDER — BUDESONIDE AND FORMOTEROL FUMARATE DIHYDRATE 160; 4.5 UG/1; UG/1
2 AEROSOL RESPIRATORY (INHALATION) 2 TIMES DAILY
Qty: 11 G | Refills: 6 | Status: SHIPPED | OUTPATIENT
Start: 2024-05-20

## 2024-05-20 RX ORDER — FLUCONAZOLE 150 MG/1
150 TABLET ORAL ONCE
Qty: 1 TABLET | Refills: 0 | Status: SHIPPED | OUTPATIENT
Start: 2024-05-20 | End: 2024-05-20

## 2024-05-20 ASSESSMENT — ENCOUNTER SYMPTOMS
BACK PAIN: 0
RHINORRHEA: 0
FACIAL SWELLING: 0
COLOR CHANGE: 0
TROUBLE SWALLOWING: 0
SINUS PRESSURE: 0
SHORTNESS OF BREATH: 1
ABDOMINAL PAIN: 0
CONSTIPATION: 0
SINUS PAIN: 0
VOICE CHANGE: 0
VOMITING: 0
CHEST TIGHTNESS: 0
WHEEZING: 0
DIARRHEA: 0
SORE THROAT: 0
COUGH: 0
NAUSEA: 0

## 2024-05-20 NOTE — PROGRESS NOTES
OFFICE PROGRESS NOTE  [facility] [place] [facility]  [address] [place] [address]  Dept: [phone]   Chief Complaint   Patient presents with    Asthma    Health Maintenance     Due for pne, tdap, pap, Rsv, low dose CT.     Vaginitis       ASSESSMENT/PLAN   1. Moderate persistent asthma without status asthmaticus without complication  Assessment & Plan:   Stable at this time, asymptomatic continue Symbicort  2. Vaginal candida  Assessment & Plan:   RX diflucan 150 mg x 1 dose.   Orders:  -     fluconazole (DIFLUCAN) 150 MG tablet; Take 1 tablet by mouth once for 1 dose, Disp-1 tablet, R-0Print  3. Personal history of tobacco use  Assessment & Plan:  Discussed with the patient the current USPSTF guidelines released March 9, 2021 for screening for lung cancer.    For adults aged 50 to 80 years who have a 20 pack-year smoking history and currently smoke or have quit within the past 15 years the grade B recommendation is to:  Screen for lung cancer with low-dose computed tomography (LDCT) every year.  Stop screening once a person has not smoked for 15 years or has a health problem that limits life expectancy or the ability to have lung surgery.    The patient  reports that she has been smoking cigarettes. She started smoking about 37 years ago. She has a 35.2 pack-year smoking history. She has been exposed to tobacco smoke. She has never used smokeless tobacco.. Discussed with patient the risks and benefits of screening, including over-diagnosis, false positive rate, and total radiation exposure.  The patient currently exhibits no signs or symptoms suggestive of lung cancer.  Discussed with patient the importance of compliance with yearly annual lung cancer screenings and willingness to undergo diagnosis and treatment if screening scan is positive.  In addition, the patient was counseled regarding the importance of remaining smoke free and/or total smoking cessation.    Also

## 2024-05-20 NOTE — PATIENT INSTRUCTIONS
Boniva, Actonel other options for osteoporosis     Learning About Lung Cancer Screening  What is screening for lung cancer?     Lung cancer screening is a way to find some lung cancers early, before a person has any symptoms of the cancer.  Lung cancer screening may help those who have the highest risk for lung cancer--people age 50 and older who are or were heavy smokers. For most people, who aren't at increased risk, screening for lung cancer probably isn't helpful.  Screening won't prevent cancer. And it may not find all lung cancers. Lung cancer screening may lower the risk of dying from lung cancer in a small number of people.  How is it done?  Lung cancer screening is done with a low-dose CT (computed tomography) scan. A CT scan uses X-rays, or radiation, to make detailed pictures of your body. Experts recommend that screening be done in medical centers that focus on finding and treating lung cancer.  Who is screening recommended for?  Lung cancer screening is recommended for people age 50 and older who are or were heavy smokers. That means people with a smoking history of at least 20 pack years. A pack year is a way to measure how heavy a smoker you are or were.  To figure out your pack years, multiply how many packs a day on average (assuming 20 cigarettes per pack) you have smoked by how many years you have smoked. For example:  If you smoked 1 pack a day for 20 years, that's 1 times 20. So you have a smoking history of 20 pack years.  If you smoked 2 packs a day for 10 years, that's 2 times 10. So you have a smoking history of 20 pack years.  Experts agree that screening is for people who have a high risk of lung cancer. But experts don't agree on what high risk means. Some say people age 50 or older with at least a 20-pack-year smoking history are high risk. Others say it's people age 55 or older with a 30-pack-year history.  To see if you could benefit from screening, first find out if you are at high

## 2024-05-20 NOTE — ASSESSMENT & PLAN NOTE
Discussed with the patient the current USPSTF guidelines released March 9, 2021 for screening for lung cancer.    For adults aged 50 to 80 years who have a 20 pack-year smoking history and currently smoke or have quit within the past 15 years the grade B recommendation is to:  Screen for lung cancer with low-dose computed tomography (LDCT) every year.  Stop screening once a person has not smoked for 15 years or has a health problem that limits life expectancy or the ability to have lung surgery.    The patient  reports that she has been smoking cigarettes. She started smoking about 37 years ago. She has a 35.2 pack-year smoking history. She has been exposed to tobacco smoke. She has never used smokeless tobacco.. Discussed with patient the risks and benefits of screening, including over-diagnosis, false positive rate, and total radiation exposure.  The patient currently exhibits no signs or symptoms suggestive of lung cancer.  Discussed with patient the importance of compliance with yearly annual lung cancer screenings and willingness to undergo diagnosis and treatment if screening scan is positive.  In addition, the patient was counseled regarding the importance of remaining smoke free and/or total smoking cessation.    Also reviewed the following if the patient has Medicare that as of February 10, 2022, Medicare only covers LDCT screening in patients aged 50-77 with at least a 20 pack-year smoking history who currently smoke or have quit in the last 15 years

## 2024-05-21 DIAGNOSIS — C43.72 MALIGNANT MELANOMA OF LEFT LOWER EXTREMITY INCLUDING HIP (HCC): Primary | ICD-10-CM

## 2024-05-22 ENCOUNTER — HOSPITAL ENCOUNTER (OUTPATIENT)
Dept: INFUSION THERAPY | Age: 64
Discharge: HOME | End: 2024-05-22
Payer: COMMERCIAL

## 2024-05-22 ENCOUNTER — OFFICE VISIT (OUTPATIENT)
Dept: ONCOLOGY | Age: 64
End: 2024-05-22
Payer: COMMERCIAL

## 2024-05-22 ENCOUNTER — TELEPHONE (OUTPATIENT)
Dept: CASE MANAGEMENT | Age: 64
End: 2024-05-22

## 2024-05-22 VITALS
WEIGHT: 177.7 LBS | HEIGHT: 68 IN | BODY MASS INDEX: 26.93 KG/M2 | DIASTOLIC BLOOD PRESSURE: 73 MMHG | HEART RATE: 63 BPM | OXYGEN SATURATION: 97 % | SYSTOLIC BLOOD PRESSURE: 99 MMHG | TEMPERATURE: 97.3 F

## 2024-05-22 DIAGNOSIS — H53.2 DOUBLE VISION: ICD-10-CM

## 2024-05-22 DIAGNOSIS — C50.911 INVASIVE DUCTAL CARCINOMA OF RIGHT BREAST (HCC): Primary | ICD-10-CM

## 2024-05-22 DIAGNOSIS — E87.1 HYPONATREMIA: ICD-10-CM

## 2024-05-22 DIAGNOSIS — E86.1 HYPOVOLEMIA: ICD-10-CM

## 2024-05-22 DIAGNOSIS — C43.72 MALIGNANT MELANOMA OF LEFT LOWER EXTREMITY INCLUDING HIP (HCC): ICD-10-CM

## 2024-05-22 LAB
ALBUMIN SERPL-MCNC: 3.8 G/DL (ref 3.5–5.2)
ALP SERPL-CCNC: 87 U/L (ref 35–104)
ALT SERPL-CCNC: 19 U/L (ref 0–32)
ANION GAP SERPL CALCULATED.3IONS-SCNC: 18 MMOL/L (ref 7–16)
AST SERPL-CCNC: 24 U/L (ref 0–31)
BASOPHILS # BLD: 0.06 K/UL (ref 0–0.2)
BASOPHILS NFR BLD: 1 % (ref 0–2)
BILIRUB SERPL-MCNC: 0.6 MG/DL (ref 0–1.2)
BUN SERPL-MCNC: 28 MG/DL (ref 6–23)
CALCIUM SERPL-MCNC: 8.8 MG/DL (ref 8.6–10.2)
CHLORIDE SERPL-SCNC: 93 MMOL/L (ref 98–107)
CO2 SERPL-SCNC: 15 MMOL/L (ref 22–29)
CREAT SERPL-MCNC: 0.9 MG/DL (ref 0.5–1)
EOSINOPHIL # BLD: 0.05 K/UL (ref 0.05–0.5)
EOSINOPHILS RELATIVE PERCENT: 0 % (ref 0–6)
ERYTHROCYTE [DISTWIDTH] IN BLOOD BY AUTOMATED COUNT: 12.7 % (ref 11.5–15)
GFR, ESTIMATED: 76 ML/MIN/1.73M2
GLUCOSE SERPL-MCNC: 123 MG/DL (ref 74–99)
HCT VFR BLD AUTO: 40.3 % (ref 34–48)
HGB BLD-MCNC: 14.6 G/DL (ref 11.5–15.5)
IMM GRANULOCYTES # BLD AUTO: 0.11 K/UL (ref 0–0.58)
IMM GRANULOCYTES NFR BLD: 1 % (ref 0–5)
LYMPHOCYTES NFR BLD: 0.74 K/UL (ref 1.5–4)
LYMPHOCYTES RELATIVE PERCENT: 7 % (ref 20–42)
MCH RBC QN AUTO: 32.3 PG (ref 26–35)
MCHC RBC AUTO-ENTMCNC: 36.2 G/DL (ref 32–34.5)
MCV RBC AUTO: 89.2 FL (ref 80–99.9)
MONOCYTES NFR BLD: 0.99 K/UL (ref 0.1–0.95)
MONOCYTES NFR BLD: 9 % (ref 2–12)
NEUTROPHILS NFR BLD: 83 % (ref 43–80)
NEUTS SEG NFR BLD: 9.27 K/UL (ref 1.8–7.3)
OSMOLALITY SERPL: 281 MOSM/KG (ref 285–310)
OSMOLALITY UR: 389 MOSM/KG (ref 300–900)
PLATELET # BLD AUTO: 215 K/UL (ref 130–450)
PMV BLD AUTO: 9.5 FL (ref 7–12)
POTASSIUM SERPL-SCNC: 3.5 MMOL/L (ref 3.5–5)
PROT SERPL-MCNC: 7.2 G/DL (ref 6.4–8.3)
RBC # BLD AUTO: 4.52 M/UL (ref 3.5–5.5)
SODIUM SERPL-SCNC: 126 MMOL/L (ref 132–146)
SODIUM UR-SCNC: <20 MMOL/L
WBC OTHER # BLD: 11.2 K/UL (ref 4.5–11.5)

## 2024-05-22 PROCEDURE — 84300 ASSAY OF URINE SODIUM: CPT

## 2024-05-22 PROCEDURE — 83935 ASSAY OF URINE OSMOLALITY: CPT

## 2024-05-22 PROCEDURE — 99213 OFFICE O/P EST LOW 20 MIN: CPT | Performed by: STUDENT IN AN ORGANIZED HEALTH CARE EDUCATION/TRAINING PROGRAM

## 2024-05-22 PROCEDURE — 99213 OFFICE O/P EST LOW 20 MIN: CPT

## 2024-05-22 PROCEDURE — 36415 COLL VENOUS BLD VENIPUNCTURE: CPT

## 2024-05-22 PROCEDURE — 83930 ASSAY OF BLOOD OSMOLALITY: CPT

## 2024-05-22 PROCEDURE — 80053 COMPREHEN METABOLIC PANEL: CPT

## 2024-05-22 PROCEDURE — 85025 COMPLETE CBC W/AUTO DIFF WBC: CPT

## 2024-05-22 RX ORDER — SODIUM CHLORIDE 9 MG/ML
INJECTION, SOLUTION INTRAVENOUS CONTINUOUS
Status: CANCELLED | OUTPATIENT
Start: 2024-05-22

## 2024-05-22 RX ORDER — ACETAMINOPHEN 325 MG/1
650 TABLET ORAL
Status: CANCELLED | OUTPATIENT
Start: 2024-05-22

## 2024-05-22 RX ORDER — ALBUTEROL SULFATE 90 UG/1
4 AEROSOL, METERED RESPIRATORY (INHALATION) PRN
Status: CANCELLED | OUTPATIENT
Start: 2024-05-22

## 2024-05-22 RX ORDER — SODIUM CHLORIDE 0.9 % (FLUSH) 0.9 %
5-40 SYRINGE (ML) INJECTION PRN
Status: CANCELLED | OUTPATIENT
Start: 2024-05-22

## 2024-05-22 RX ORDER — EPINEPHRINE 1 MG/ML
0.3 INJECTION, SOLUTION, CONCENTRATE INTRAVENOUS PRN
Status: CANCELLED | OUTPATIENT
Start: 2024-05-22

## 2024-05-22 RX ORDER — ONDANSETRON 2 MG/ML
8 INJECTION INTRAMUSCULAR; INTRAVENOUS
Status: CANCELLED | OUTPATIENT
Start: 2024-05-22

## 2024-05-22 RX ORDER — 0.9 % SODIUM CHLORIDE 0.9 %
1000 INTRAVENOUS SOLUTION INTRAVENOUS ONCE
Status: CANCELLED | OUTPATIENT
Start: 2024-05-22 | End: 2024-05-22

## 2024-05-22 RX ORDER — DIPHENHYDRAMINE HYDROCHLORIDE 50 MG/ML
50 INJECTION INTRAMUSCULAR; INTRAVENOUS
Status: CANCELLED | OUTPATIENT
Start: 2024-05-22

## 2024-05-22 RX ORDER — SODIUM CHLORIDE 9 MG/ML
5-250 INJECTION, SOLUTION INTRAVENOUS PRN
Status: CANCELLED | OUTPATIENT
Start: 2024-05-22

## 2024-05-22 RX ORDER — HEPARIN 100 UNIT/ML
500 SYRINGE INTRAVENOUS PRN
Status: CANCELLED | OUTPATIENT
Start: 2024-05-22

## 2024-05-22 ASSESSMENT — ENCOUNTER SYMPTOMS
GASTROINTESTINAL NEGATIVE: 1
EYES NEGATIVE: 1
RESPIRATORY NEGATIVE: 1

## 2024-05-22 NOTE — TELEPHONE ENCOUNTER
Met with patient during her follow up appointment with Dr. Darline guerra.  Patient completed her active treatment for her Stage IA (T1b, N0, M0) ER/CO+, Her2- right breast cancer.  States that she is doing well besides some recent dizziness and unsteadiness.  Reports that her appetite is good and she is sleeping well.  Upon inquiring, states that she decided not to take the Arimidex medication due to the possible side effects and other patient reviews that she read online.  Provided support and encouragement.  Instructed patient in detail on her Cancer Treatment Summary and Survivorship Care Plan.  Provided with a written copy.  Aware that a copy will be sent to her PCP as well.  Provided written copies of Nutrition Following Cancer Treatment: Oncology Nutrition Guide, [facility] support group pamphlet, and Thriving and Surviving Post-Cancer Treatment: Nutritional and Emotional Support Services packet.  Patient aware of mammogram scheduled for 2/17/2025 during her next follow up appointment with Dr. Uriarte at [facility].  Completed patient's lymphedema upper extremity measurements and charted in patient's flow sheet.  Instructed patient to call with any questions or concerns.  Verbally agreed.  Patient appreciative of visit and information.  Lin Cruz, TASHIA, BSW, RN, OCN  Oncology Nurse Navigator

## 2024-05-22 NOTE — PROGRESS NOTES
sonographic guidance. Once the needle was in appropriate position, the Mag seed was deployed within the mass.  Final sonographic images demonstrated appropriate positioning of the Mag seed. Pressure was applied for hemostasis. A dry sterile dressing was applied. The patient tolerated the procedure well with no immediate complications. The patient was then taken to the mammography suite for a post-procedure mammogram. POSTPROCEDURE MAMMOGRAM: ML and CC views of the right breast were obtained immediately following the procedure. The Mag seed is in good position.     Technically successful localization with Mag seed placement for right breast malignancy 12 o'clock position           ASSESSMENT     Invasive ductal carcinoma of the right breast, ER 95%, OR >95%, HER2-, Stage pT1b N0, Oncotype 6  Diagnosed on breast biopsy on 1/5/24  S/p lumpectomy and SLN on 2/7/24  Completed adjuvant radiation around mid April 2024  Initially agreed for anastrozole, the patient refused medication after much thought    Chronic smoker: noted 35 ppd smoking history    Osteopenia:   DEXA from 11/23/21 noted patient having Osteoporosis at this time w/ T score of -2.9 of L spine  Repeat DEXA from 4/12/24, now suggests osteopenia, w/ T-score of -2.2 of L spine    02/29/2024: I am meeting the patient for the first time. She has early stage HR+/HER2- RIGHT-sided breast cancer, successfully had undergone lumpectomy. She is doing well without major issues. She was accompanied by her daughter today. Today, we discussed next steps. She still has yet to meet with radiation oncology. We also discussed role of endocrine therapy. Her last DEXA was on 11/23/21, noted osteoporosis, noting T score of -2.9 of the L-spine. T socre of left hip is -2.4. T score of fore arm is -4.6. She has been on Fosamax since then. She would be due for another DEXA.     04/24/2024: Pt c/o pain from radiation burns and skin peeling. Finished RT a few weeks back. She was felt

## 2024-05-24 ENCOUNTER — HOSPITAL ENCOUNTER (OUTPATIENT)
Dept: INFUSION THERAPY | Age: 64
Discharge: HOME | End: 2024-05-24
Payer: COMMERCIAL

## 2024-05-24 VITALS
OXYGEN SATURATION: 99 % | SYSTOLIC BLOOD PRESSURE: 99 MMHG | TEMPERATURE: 97.2 F | RESPIRATION RATE: 16 BRPM | DIASTOLIC BLOOD PRESSURE: 57 MMHG | HEART RATE: 65 BPM

## 2024-05-24 DIAGNOSIS — E86.1 HYPOVOLEMIA: Primary | ICD-10-CM

## 2024-05-24 PROCEDURE — 2580000003 HC RX 258: Performed by: STUDENT IN AN ORGANIZED HEALTH CARE EDUCATION/TRAINING PROGRAM

## 2024-05-24 PROCEDURE — 96360 HYDRATION IV INFUSION INIT: CPT

## 2024-05-24 RX ORDER — SODIUM CHLORIDE 9 MG/ML
5-250 INJECTION, SOLUTION INTRAVENOUS PRN
OUTPATIENT
Start: 2024-05-24

## 2024-05-24 RX ORDER — FAMOTIDINE 10 MG/ML
20 INJECTION, SOLUTION INTRAVENOUS
OUTPATIENT
Start: 2024-05-24

## 2024-05-24 RX ORDER — 0.9 % SODIUM CHLORIDE 0.9 %
1000 INTRAVENOUS SOLUTION INTRAVENOUS ONCE
Status: COMPLETED | OUTPATIENT
Start: 2024-05-24 | End: 2024-05-24

## 2024-05-24 RX ORDER — SODIUM CHLORIDE 0.9 % (FLUSH) 0.9 %
5-40 SYRINGE (ML) INJECTION PRN
OUTPATIENT
Start: 2024-05-24

## 2024-05-24 RX ORDER — ALBUTEROL SULFATE 90 UG/1
4 AEROSOL, METERED RESPIRATORY (INHALATION) PRN
OUTPATIENT
Start: 2024-05-24

## 2024-05-24 RX ORDER — DIPHENHYDRAMINE HYDROCHLORIDE 50 MG/ML
50 INJECTION INTRAMUSCULAR; INTRAVENOUS
OUTPATIENT
Start: 2024-05-24

## 2024-05-24 RX ORDER — ONDANSETRON 2 MG/ML
8 INJECTION INTRAMUSCULAR; INTRAVENOUS
OUTPATIENT
Start: 2024-05-24

## 2024-05-24 RX ORDER — HEPARIN 100 UNIT/ML
500 SYRINGE INTRAVENOUS PRN
OUTPATIENT
Start: 2024-05-24

## 2024-05-24 RX ORDER — SODIUM CHLORIDE 9 MG/ML
INJECTION, SOLUTION INTRAVENOUS CONTINUOUS
OUTPATIENT
Start: 2024-05-24

## 2024-05-24 RX ORDER — SODIUM CHLORIDE 0.9 % (FLUSH) 0.9 %
5-40 SYRINGE (ML) INJECTION PRN
Status: DISCONTINUED | OUTPATIENT
Start: 2024-05-24 | End: 2024-05-25 | Stop reason: HOSPADM

## 2024-05-24 RX ORDER — ACETAMINOPHEN 325 MG/1
650 TABLET ORAL
OUTPATIENT
Start: 2024-05-24

## 2024-05-24 RX ORDER — 0.9 % SODIUM CHLORIDE 0.9 %
1000 INTRAVENOUS SOLUTION INTRAVENOUS ONCE
Status: CANCELLED | OUTPATIENT
Start: 2024-05-24 | End: 2024-05-24

## 2024-05-24 RX ORDER — EPINEPHRINE 1 MG/ML
0.3 INJECTION, SOLUTION, CONCENTRATE INTRAVENOUS PRN
OUTPATIENT
Start: 2024-05-24

## 2024-05-24 RX ADMIN — SODIUM CHLORIDE 1000 ML: 9 INJECTION, SOLUTION INTRAVENOUS at 09:13

## 2024-05-24 RX ADMIN — SODIUM CHLORIDE, PRESERVATIVE FREE 10 ML: 5 INJECTION INTRAVENOUS at 09:12

## 2024-05-24 ASSESSMENT — PAIN DESCRIPTION - LOCATION: LOCATION: BACK

## 2024-05-24 ASSESSMENT — PAIN DESCRIPTION - PAIN TYPE: TYPE: CHRONIC PAIN

## 2024-05-24 ASSESSMENT — PAIN DESCRIPTION - ORIENTATION: ORIENTATION: LOWER

## 2024-05-24 ASSESSMENT — PAIN SCALES - GENERAL: PAINLEVEL_OUTOF10: 4

## 2024-05-24 NOTE — PROGRESS NOTES
Patient tolerated hydration infusion well. Patient alert and oriented x3.   No distress noted.   Vital signs stable.   Patient denies any new or worsening pain.

## 2024-06-03 ENCOUNTER — TELEPHONE (OUTPATIENT)
Dept: FAMILY MEDICINE CLINIC | Age: 64
End: 2024-06-03

## 2024-06-03 NOTE — TELEPHONE ENCOUNTER
Called pt and provided her with names of providers taking new patients.  Pt said she'll do some research and call back if interested in scheduling.    ----- Message from Jose Ellison sent at 6/3/2024  1:20 PM EDT -----  Regarding: [facility] Appointment Request  [facility] Appointment Request    Patient needs appointment for [facility] Appointment Type: New to Provider.    Reason for Appointment Request: No appointments available during search  patient said her PCP is going out of the practice already and wants to be established with Dr. Alice Mckenzie.  --------------------------------------------------------------------------------------------------------------------------    Relationship to Patient: Self     Call Back Information: OK to leave message on voicemail  Preferred Call Back Number: Phone [phone]

## 2024-06-13 DIAGNOSIS — H53.2 DOUBLE VISION: Primary | ICD-10-CM

## 2024-06-13 RX ORDER — LORAZEPAM 0.5 MG/1
0.5 TABLET ORAL ONCE
Qty: 1 TABLET | Refills: 0 | Status: SHIPPED | OUTPATIENT
Start: 2024-06-13 | End: 2024-06-13

## 2024-06-17 ENCOUNTER — HOSPITAL ENCOUNTER (OUTPATIENT)
Dept: MRI IMAGING | Age: 64
Discharge: HOME | End: 2024-06-19
Payer: COMMERCIAL

## 2024-06-17 DIAGNOSIS — H53.2 DOUBLE VISION: ICD-10-CM

## 2024-06-17 PROCEDURE — 70551 MRI BRAIN STEM W/O DYE: CPT

## 2024-06-21 ENCOUNTER — APPOINTMENT (OUTPATIENT)
Dept: CT IMAGING | Age: 64
End: 2024-06-21
Payer: COMMERCIAL

## 2024-06-21 ENCOUNTER — APPOINTMENT (OUTPATIENT)
Dept: GENERAL RADIOLOGY | Age: 64
End: 2024-06-21
Payer: COMMERCIAL

## 2024-06-21 ENCOUNTER — OFFICE VISIT (OUTPATIENT)
Dept: ONCOLOGY | Age: 64
End: 2024-06-21

## 2024-06-21 ENCOUNTER — HOSPITAL ENCOUNTER (EMERGENCY)
Age: 64
Discharge: LEFT BEFORE DISCHARGE | End: 2024-06-22
Attending: STUDENT IN AN ORGANIZED HEALTH CARE EDUCATION/TRAINING PROGRAM
Payer: COMMERCIAL

## 2024-06-21 ENCOUNTER — HOSPITAL ENCOUNTER (OUTPATIENT)
Dept: INFUSION THERAPY | Age: 64
Discharge: HOME | End: 2024-06-21
Payer: COMMERCIAL

## 2024-06-21 ENCOUNTER — TELEPHONE (OUTPATIENT)
Dept: ONCOLOGY | Age: 64
End: 2024-06-21

## 2024-06-21 VITALS
DIASTOLIC BLOOD PRESSURE: 77 MMHG | HEART RATE: 47 BPM | TEMPERATURE: 97.2 F | OXYGEN SATURATION: 99 % | BODY MASS INDEX: 26.6 KG/M2 | WEIGHT: 175.5 LBS | HEIGHT: 68 IN | SYSTOLIC BLOOD PRESSURE: 101 MMHG

## 2024-06-21 VITALS
OXYGEN SATURATION: 97 % | SYSTOLIC BLOOD PRESSURE: 110 MMHG | HEART RATE: 88 BPM | RESPIRATION RATE: 28 BRPM | TEMPERATURE: 98.3 F | DIASTOLIC BLOOD PRESSURE: 31 MMHG

## 2024-06-21 DIAGNOSIS — Z53.29 LEFT AGAINST MEDICAL ADVICE: ICD-10-CM

## 2024-06-21 DIAGNOSIS — E87.1 HYPONATREMIA: Primary | ICD-10-CM

## 2024-06-21 DIAGNOSIS — E87.1 HYPONATREMIA: ICD-10-CM

## 2024-06-21 DIAGNOSIS — E87.6 HYPOKALEMIA: ICD-10-CM

## 2024-06-21 DIAGNOSIS — E83.42 HYPOMAGNESEMIA: ICD-10-CM

## 2024-06-21 LAB
ALBUMIN SERPL-MCNC: 4.1 G/DL (ref 3.5–5.2)
ALBUMIN SERPL-MCNC: 4.3 G/DL (ref 3.5–5.2)
ALP SERPL-CCNC: 112 U/L (ref 35–104)
ALP SERPL-CCNC: 115 U/L (ref 35–104)
ALT SERPL-CCNC: 21 U/L (ref 0–32)
ALT SERPL-CCNC: 22 U/L (ref 0–32)
ANION GAP SERPL CALCULATED.3IONS-SCNC: 15 MMOL/L (ref 7–16)
ANION GAP SERPL CALCULATED.3IONS-SCNC: 20 MMOL/L (ref 7–16)
AST SERPL-CCNC: 23 U/L (ref 0–31)
AST SERPL-CCNC: 23 U/L (ref 0–31)
BASOPHILS # BLD: 0.08 K/UL (ref 0–0.2)
BASOPHILS NFR BLD: 1 % (ref 0–2)
BILIRUB SERPL-MCNC: 0.3 MG/DL (ref 0–1.2)
BILIRUB SERPL-MCNC: 0.5 MG/DL (ref 0–1.2)
BILIRUB UR QL STRIP: NEGATIVE
BUN SERPL-MCNC: 43 MG/DL (ref 6–23)
BUN SERPL-MCNC: 48 MG/DL (ref 6–23)
CALCIUM SERPL-MCNC: 9.2 MG/DL (ref 8.6–10.2)
CALCIUM SERPL-MCNC: 9.3 MG/DL (ref 8.6–10.2)
CHLORIDE SERPL-SCNC: 87 MMOL/L (ref 98–107)
CHLORIDE SERPL-SCNC: 89 MMOL/L (ref 98–107)
CLARITY UR: CLEAR
CO2 SERPL-SCNC: 17 MMOL/L (ref 22–29)
CO2 SERPL-SCNC: 18 MMOL/L (ref 22–29)
COLOR UR: YELLOW
CORTIS SERPL-MCNC: 20.7 UG/DL (ref 2.7–18.4)
CORTISOL COLLECTION INFO: ABNORMAL
CREAT SERPL-MCNC: 1.4 MG/DL (ref 0.5–1)
CREAT SERPL-MCNC: 1.5 MG/DL (ref 0.5–1)
EOSINOPHIL # BLD: 0.12 K/UL (ref 0.05–0.5)
EOSINOPHILS RELATIVE PERCENT: 1 % (ref 0–6)
EPI CELLS #/AREA URNS HPF: ABNORMAL /HPF
ERYTHROCYTE [DISTWIDTH] IN BLOOD BY AUTOMATED COUNT: 13.3 % (ref 11.5–15)
GFR, ESTIMATED: 39 ML/MIN/1.73M2
GFR, ESTIMATED: 41 ML/MIN/1.73M2
GLUCOSE SERPL-MCNC: 120 MG/DL (ref 74–99)
GLUCOSE SERPL-MCNC: 124 MG/DL (ref 74–99)
GLUCOSE UR STRIP-MCNC: NEGATIVE MG/DL
HCT VFR BLD AUTO: 37.9 % (ref 34–48)
HGB BLD-MCNC: 13.8 G/DL (ref 11.5–15.5)
HGB UR QL STRIP.AUTO: NEGATIVE
IMM GRANULOCYTES # BLD AUTO: 0.22 K/UL (ref 0–0.58)
IMM GRANULOCYTES NFR BLD: 1 % (ref 0–5)
KETONES UR STRIP-MCNC: NEGATIVE MG/DL
LACTATE BLDV-SCNC: 1.5 MMOL/L (ref 0.5–2.2)
LEUKOCYTE ESTERASE UR QL STRIP: ABNORMAL
LIPASE SERPL-CCNC: 295 U/L (ref 13–60)
LYMPHOCYTES NFR BLD: 1.16 K/UL (ref 1.5–4)
LYMPHOCYTES RELATIVE PERCENT: 8 % (ref 20–42)
MAGNESIUM SERPL-MCNC: 1.2 MG/DL (ref 1.6–2.6)
MCH RBC QN AUTO: 32.6 PG (ref 26–35)
MCHC RBC AUTO-ENTMCNC: 36.4 G/DL (ref 32–34.5)
MCV RBC AUTO: 89.6 FL (ref 80–99.9)
MONOCYTES NFR BLD: 1.04 K/UL (ref 0.1–0.95)
MONOCYTES NFR BLD: 7 % (ref 2–12)
NEUTROPHILS NFR BLD: 83 % (ref 43–80)
NEUTS SEG NFR BLD: 12.91 K/UL (ref 1.8–7.3)
NITRITE UR QL STRIP: NEGATIVE
OSMOLALITY SERPL: 273 MOSM/KG (ref 285–310)
PH UR STRIP: 6 [PH] (ref 5–9)
PLATELET, FLUORESCENCE: 303 K/UL (ref 130–450)
PMV BLD AUTO: 9 FL (ref 7–12)
POTASSIUM SERPL-SCNC: 3 MMOL/L (ref 3.5–5)
POTASSIUM SERPL-SCNC: 3.4 MMOL/L (ref 3.5–5)
PROT SERPL-MCNC: 7.8 G/DL (ref 6.4–8.3)
PROT SERPL-MCNC: 8 G/DL (ref 6.4–8.3)
PROT UR STRIP-MCNC: ABNORMAL MG/DL
RBC # BLD AUTO: 4.23 M/UL (ref 3.5–5.5)
RBC #/AREA URNS HPF: ABNORMAL /HPF
SARS-COV-2 RDRP RESP QL NAA+PROBE: NOT DETECTED
SODIUM SERPL-SCNC: 122 MMOL/L (ref 132–146)
SODIUM SERPL-SCNC: 124 MMOL/L (ref 132–146)
SP GR UR STRIP: 1.02 (ref 1–1.03)
SPECIMEN DESCRIPTION: NORMAL
TROPONIN I SERPL HS-MCNC: 22 NG/L (ref 0–9)
TROPONIN I SERPL HS-MCNC: 26 NG/L (ref 0–9)
TSH SERPL DL<=0.05 MIU/L-ACNC: 2.8 UIU/ML (ref 0.27–4.2)
UROBILINOGEN UR STRIP-ACNC: 0.2 EU/DL (ref 0–1)
WBC #/AREA URNS HPF: ABNORMAL /HPF
WBC OTHER # BLD: 15.5 K/UL (ref 4.5–11.5)

## 2024-06-21 PROCEDURE — 6370000000 HC RX 637 (ALT 250 FOR IP)

## 2024-06-21 PROCEDURE — 80053 COMPREHEN METABOLIC PANEL: CPT

## 2024-06-21 PROCEDURE — 93005 ELECTROCARDIOGRAM TRACING: CPT

## 2024-06-21 PROCEDURE — 83605 ASSAY OF LACTIC ACID: CPT

## 2024-06-21 PROCEDURE — 83930 ASSAY OF BLOOD OSMOLALITY: CPT

## 2024-06-21 PROCEDURE — 82533 TOTAL CORTISOL: CPT

## 2024-06-21 PROCEDURE — 96366 THER/PROPH/DIAG IV INF ADDON: CPT

## 2024-06-21 PROCEDURE — 87635 SARS-COV-2 COVID-19 AMP PRB: CPT

## 2024-06-21 PROCEDURE — 36415 COLL VENOUS BLD VENIPUNCTURE: CPT

## 2024-06-21 PROCEDURE — 84484 ASSAY OF TROPONIN QUANT: CPT

## 2024-06-21 PROCEDURE — 83735 ASSAY OF MAGNESIUM: CPT

## 2024-06-21 PROCEDURE — 85025 COMPLETE CBC W/AUTO DIFF WBC: CPT

## 2024-06-21 PROCEDURE — 82024 ASSAY OF ACTH: CPT

## 2024-06-21 PROCEDURE — 83690 ASSAY OF LIPASE: CPT

## 2024-06-21 PROCEDURE — 6360000002 HC RX W HCPCS

## 2024-06-21 PROCEDURE — 2580000003 HC RX 258

## 2024-06-21 PROCEDURE — 81001 URINALYSIS AUTO W/SCOPE: CPT

## 2024-06-21 PROCEDURE — 96365 THER/PROPH/DIAG IV INF INIT: CPT

## 2024-06-21 PROCEDURE — 71045 X-RAY EXAM CHEST 1 VIEW: CPT

## 2024-06-21 PROCEDURE — 74176 CT ABD & PELVIS W/O CONTRAST: CPT

## 2024-06-21 PROCEDURE — 99285 EMERGENCY DEPT VISIT HI MDM: CPT

## 2024-06-21 PROCEDURE — 84443 ASSAY THYROID STIM HORMONE: CPT

## 2024-06-21 RX ORDER — 0.9 % SODIUM CHLORIDE 0.9 %
500 INTRAVENOUS SOLUTION INTRAVENOUS ONCE
Status: COMPLETED | OUTPATIENT
Start: 2024-06-21 | End: 2024-06-21

## 2024-06-21 RX ORDER — MAGNESIUM OXIDE 400 MG/1
400 TABLET ORAL DAILY
Qty: 5 TABLET | Refills: 0 | Status: SHIPPED | OUTPATIENT
Start: 2024-06-21 | End: 2024-06-26

## 2024-06-21 RX ORDER — POTASSIUM CHLORIDE 20 MEQ/1
20 TABLET, EXTENDED RELEASE ORAL DAILY
Qty: 5 TABLET | Refills: 0 | Status: SHIPPED | OUTPATIENT
Start: 2024-06-21 | End: 2024-06-26

## 2024-06-21 RX ORDER — SODIUM CHLORIDE 9 MG/ML
INJECTION, SOLUTION INTRAVENOUS
Status: COMPLETED
Start: 2024-06-21 | End: 2024-06-21

## 2024-06-21 RX ORDER — MAGNESIUM SULFATE IN WATER 40 MG/ML
2000 INJECTION, SOLUTION INTRAVENOUS ONCE
Status: COMPLETED | OUTPATIENT
Start: 2024-06-21 | End: 2024-06-21

## 2024-06-21 RX ADMIN — POTASSIUM BICARBONATE 50 MEQ: 978 TABLET, EFFERVESCENT ORAL at 20:19

## 2024-06-21 RX ADMIN — Medication 500 ML: at 18:19

## 2024-06-21 RX ADMIN — MAGNESIUM SULFATE HEPTAHYDRATE 2000 MG: 40 INJECTION, SOLUTION INTRAVENOUS at 20:18

## 2024-06-21 RX ADMIN — SODIUM CHLORIDE 500 ML: 9 INJECTION, SOLUTION INTRAVENOUS at 18:19

## 2024-06-21 ASSESSMENT — ENCOUNTER SYMPTOMS
GASTROINTESTINAL NEGATIVE: 1
EYE PROBLEMS: 1
RESPIRATORY NEGATIVE: 1

## 2024-06-21 NOTE — TELEPHONE ENCOUNTER
Reviewed labs. Sodium is 122. Called patient back, advised to go to ED. I explained reasoning.   She will try to have  or family to take her. Will try to call patient back later today.

## 2024-06-21 NOTE — PROGRESS NOTES
[facility] MEDICAL ONCOLOGY  [address] [place] [address]  Dept: [phone]  Loc: [phone]    Clinic Progress Note      Date of Encounter: 06/21/2024     Referring Provider:  Jermaine Uriarte MD    Reason for Visit:   RIGHT IDC of the breast        PCP:  Alley Cruz, APRN - CNP    Demographics: 64 y.o. female    Chief Complaint   Patient presents with    Follow-up     carcinoma of right breast         Subjective:  Patient returns after completion of MRI.  She continues to have balance issues, multiple MSK symptoms including some falls, and what she describes to appear to be spasms.  Cannot clearly tell me if she was having any more vision issues.  She was very tearful today due to the decrease in her quality of life.    HPI from Initial Outpatient Consultation  (02/29/2024):  The patient is a 63 y.o. female comes in for right sided early stage breast cancer. History includes mammogram done on 10/24/23 noting a spiculated mass on the central right breast, further evaluated with Breast ultrasound done on 12/18/23 revealing a 1.1 x 1 cm breast mass located at the 12 o'clock positive , about 3 cm from the nipple, with BIRADS 4C category. There was also mention of a right axillary LN, 2.3 1.7 x 1 cm in size.     Her diagnosis was confirmed on biopsy on 1/5/24, and then underwent lumpectomy/SLN on 2/7/24.     Today, she was accompanied by her daughter. No major issues. She is recovering from her surgery well.       Oncology History   Invasive ductal carcinoma of right breast (HCC)   2/16/2024 -  Cancer Staged    Staging form: Breast, AJCC 8th Edition  - Clinical stage from 2/16/2024: No Stage Recommended (ycT1b, cN0(sn), cM0, G2, ER+, ID+, HER2-)     Malignant neoplasm of right breast (HCC)   1/5/2024 Biopsy    -- Diagnosis --   Right breast, 12:00 core needle biopsy: Invasive ductal carcinoma   (grade 2)     -- Diagnosis Comment --   The tumor cells are

## 2024-06-21 NOTE — ED NOTES
Department of Emergency Medicine  FIRST PROVIDER TRIAGE NOTE             Independent MLP           6/21/24  3:44 PM EDT    Date of Encounter: 6/21/24   MRN: [id number]      HPI: Kenya Bacon is a 64 y.o. female who presents to the ED for Abnormal Lab (Dr gallardo from cancer care told to come in for low K and Na)   Abnormal labs     ROS: Negative for Suicidal ideation or Homicidal Ideation.    PE: Gen Appearance/Constitutional: alert  CV: regular rate  Pulm: CTA bilat     Initial Plan of Care: All treatment areas with department are currently occupied. Plan to order/Initiate the following while awaiting opening in ED: labs.  Initiate Treatment-Testing, Proceed toTreatment Area When Bed Available for ED Attending/MLP to Continue Care    Electronically signed by LUKE Washington   DD: 6/21/24

## 2024-06-21 NOTE — ED PROVIDER NOTES
Allergic sinusitis, Asthma without status asthmaticus, Borderline hyperlipidemia, Breast cancer (HCC), Cancer (HCC), Chronic midline low back pain with left-sided sciatica, COPD (chronic obstructive pulmonary disease) (HCC), Depression, Dysuria, Emphysema of lung (HCC), Hx of blood clots, Lumbar spondylosis, MRSA (methicillin resistant Staphylococcus aureus) infection, Tinea versicolor, Ulcerative colitis (HCC), and Urinary tract infection with hematuria.    Past Surgical History:  has a past surgical history that includes other surgical history (2013); Cosmetic surgery (1983); Colonoscopy; Abdomen surgery; skin biopsy; other surgical history (Bilateral, 03/11/2021); myringotomy (Bilateral, 03/11/2021); total colectomy; Pain management procedure (Bilateral, 5/15/2023); US BREAST BIOPSY W LOC DEVICE 1ST LESION RIGHT (Right, 1/5/2024); US PLACE BREAST LOC DEVICE 1ST LESION RIGHT (Right, 2/6/2024); and Breast biopsy (Right, 2/7/2024).    Social History:  reports that she has been smoking cigarettes. She started smoking about 37 years ago. She has a 35.2 pack-year smoking history. She has been exposed to tobacco smoke. She has never used smokeless tobacco. She reports current alcohol use of about 4.0 standard drinks of alcohol per week. She reports that she does not use drugs.    Family History: family history includes Alcohol Abuse in her brother; Cancer in her paternal grandmother; Cancer (age of onset: 54) in her brother; Colon Cancer (age of onset: 83) in her paternal grandmother; Heart Attack in her maternal grandmother; Heart Disease in her brother, father, maternal grandmother, and mother; Kidney Disease in her father; Leukemia in her brother.     The patient’s home medications have been reviewed.    Allergies: Adhesive tape, Augmentin [amoxicillin-pot clavulanate], Bactrim [sulfamethoxazole-trimethoprim], Cipro [ciprofloxacin hcl], Doxycycline, Sulfa antibiotics, Tetracyclines & related, Vancomycin, and Iv dye  found.    Procedures:      ------------------------- NURSING NOTES AND VITALS REVIEWED ---------------------------   The nursing notes within the ED encounter and vital signs as below have been reviewed by myself and ED attending.  BP (!) 110/31   Pulse 88   Temp 98.3 °F (36.8 °C)   Resp 28   SpO2 97%   Oxygen Saturation Interpretation: Normal    The patient’s available past medical records and past encounters were reviewed by myself and ED attending        ------------------------------ ED COURSE/MEDICAL DECISION MAKING----------------------    Medical Decision Making/Differential Diagnosis:    CC/HPI Summary, Pertinent Physical Exam Findings, Social Determinants of health, Records Reviewed, DDx, testing done/not done, ED Course, Reassessment, disposition considerations/shared decision making with patient, consults, disposition:      Medical Decision Making/ED COURSE:    Chronic Conditions affecting care:    has a past medical history of Allergic rhinitis, Allergic sinusitis (08/04/2021), Asthma without status asthmaticus (02/14/2023), Borderline hyperlipidemia (10/13/2022), Breast cancer ([facility]) (2024), Cancer ([facility]) (dx 2013), Chronic midline low back pain with left-sided sciatica (04/19/2023), COPD (chronic obstructive pulmonary disease) ([facility]), Depression, Dysuria (08/04/2021), Emphysema of lung ([facility]), blood clots, Lumbar spondylosis (04/25/2023), MRSA (methicillin resistant Staphylococcus aureus) infection (06/28/2021), Tinea versicolor (08/04/2021), Ulcerative colitis ([facility]), and Urinary tract infection with hematuria (08/04/2021).       Myself and ED attending interpreted findings during patient's stay.   Vital signs BP (!) 110/31   Pulse 88   Temp 98.3 °F (36.8 °C)   Resp 28   SpO2 97%   While in the ED patient was hemodynamically stable, afebrile, nontoxic-appearing, in no respiratory distress.   Physical exam remarkable for generalized abdominal tenderness.  Ddx: Arrhythmia, ACS, electrolyte

## 2024-06-22 LAB
ACTH PLAS-MCNC: 5 PG/ML (ref 7–63)
EKG ATRIAL RATE: 85 BPM
EKG P AXIS: 71 DEGREES
EKG P-R INTERVAL: 136 MS
EKG Q-T INTERVAL: 388 MS
EKG QRS DURATION: 84 MS
EKG QTC CALCULATION (BAZETT): 461 MS
EKG R AXIS: -8 DEGREES
EKG T AXIS: 64 DEGREES
EKG VENTRICULAR RATE: 85 BPM

## 2024-06-22 PROCEDURE — 93010 ELECTROCARDIOGRAM REPORT: CPT | Performed by: INTERNAL MEDICINE

## 2024-06-22 NOTE — DISCHARGE INSTRUCTIONS
GET BMP done at outpatient lab on MONDAY  Please return to the ER for any new or worsening symptoms including but not limited to Fever, Chest pain or difficulty breathing, or Inability to keep down liquids  If prescribed, please be sure to  your prescriptions from the pharmacy  Please follow-up with Primary care provider as instructed

## 2024-06-24 ENCOUNTER — OFFICE VISIT (OUTPATIENT)
Dept: FAMILY MEDICINE CLINIC | Age: 64
End: 2024-06-24
Payer: COMMERCIAL

## 2024-06-24 ENCOUNTER — TELEPHONE (OUTPATIENT)
Dept: FAMILY MEDICINE CLINIC | Age: 64
End: 2024-06-24

## 2024-06-24 VITALS
OXYGEN SATURATION: 98 % | DIASTOLIC BLOOD PRESSURE: 62 MMHG | SYSTOLIC BLOOD PRESSURE: 98 MMHG | WEIGHT: 178 LBS | BODY MASS INDEX: 26.98 KG/M2 | RESPIRATION RATE: 17 BRPM | TEMPERATURE: 97.6 F | HEIGHT: 68 IN | HEART RATE: 78 BPM

## 2024-06-24 DIAGNOSIS — M79.602 LEFT ARM PAIN: Primary | ICD-10-CM

## 2024-06-24 DIAGNOSIS — E87.6 HYPOKALEMIA: ICD-10-CM

## 2024-06-24 DIAGNOSIS — E87.1 HYPONATREMIA: ICD-10-CM

## 2024-06-24 LAB
ANION GAP SERPL CALCULATED.3IONS-SCNC: 15 MMOL/L (ref 7–16)
BUN BLDV-MCNC: 27 MG/DL (ref 6–23)
CALCIUM SERPL-MCNC: 9.4 MG/DL (ref 8.6–10.2)
CHLORIDE BLD-SCNC: 87 MMOL/L (ref 98–107)
CO2: 21 MMOL/L (ref 22–29)
CREAT SERPL-MCNC: 1 MG/DL (ref 0.5–1)
GFR, ESTIMATED: 64 ML/MIN/1.73M2
GLUCOSE BLD-MCNC: 112 MG/DL (ref 74–99)
POTASSIUM SERPL-SCNC: 4.7 MMOL/L (ref 3.5–5)
SODIUM BLD-SCNC: 123 MMOL/L (ref 132–146)

## 2024-06-24 PROCEDURE — 99213 OFFICE O/P EST LOW 20 MIN: CPT

## 2024-06-24 RX ORDER — METHYLPREDNISOLONE 4 MG/1
TABLET ORAL
Qty: 1 KIT | Refills: 0 | Status: SHIPPED | OUTPATIENT
Start: 2024-06-24

## 2024-06-24 NOTE — TELEPHONE ENCOUNTER
----- Message from BERYL Cox CNP sent at 6/24/2024  3:43 PM EDT -----  Results of Ultrasound of left arm and hand: No evidence of DVT. Continue with ice and elevation. Follow up with your primary care physician.

## 2024-06-24 NOTE — PROGRESS NOTES
Motor functions intact.  Skin:  No ecchymosis, erythema, rashes, or abrasions noted.  Psychiatric: Mood and Affect: Mood normal. Behavior: Behavior normal.    Testing:   (All laboratory and radiology results have been personally reviewed by myself)  Labs:  No results found for this visit on 06/24/24.    Imaging:  All Radiology results interpreted by Radiologist unless otherwise noted.  No orders to display       Assessment / Plan:   The patient's vitals, allergies, medications, and past medical history have been reviewed.  Kenya was seen today for hand pain.    Diagnoses and all orders for this visit:    Left arm pain  -     Vascular duplex upper extremity venous left; Future    Other orders  -     methylPREDNISolone (MEDROL DOSEPACK) 4 MG tablet; Take by mouth.        - Disposition: Home    - Educational material printed for patient's review and were included in patient instructions. After Visit Summary was given to patient at the end of visit.    - Advised patient to avoid or limit activities that exacerbate discomfort and benefits of RICE therapy. Discussed other symptomatic treatments with the patient today. The patient is to schedule a follow-up with PCP in the next 2-3 days for reevaluation. Red flag symptoms were also discussed with the patient today. If symptoms worsen the patient is to go directly to the emergency department for reevaluation and treatment. Pt verbalizes understanding and is in agreement with plan of care. All questions answered.    SIGNATURE: BERYL Cox CNP      *NOTE: This report was transcribed using voice recognition software. Every effort was made to ensure accuracy; however, inadvertent computerized transcription errors may be present.

## 2024-06-25 ENCOUNTER — TELEPHONE (OUTPATIENT)
Dept: FAMILY MEDICINE CLINIC | Age: 64
End: 2024-06-25

## 2024-06-25 NOTE — TELEPHONE ENCOUNTER
Called pt and scheduled appt with Dr Mendoza 11/8/24.    ----- Message from Venkat Haq Alconon Hernan sent at 6/24/2024  4:28 PM EDT -----  Regarding: [facility] Appointment Request  [facility] Appointment Request    Patient needs appointment for [facility] Appointment Type: New to Provider.    Reason for Appointment Request: No appointments available during search. Pt preferred Dr Tianna Mendoza / Permanent PCP.pt been to emergency last friday .    --------------------------------------------------------------------------------------------------------------------------    Relationship to Patient: Self     Call Back Information: OK to leave message on voicemail  Preferred Call Back Number: Phone [phone]

## 2024-06-26 ENCOUNTER — TELEPHONE (OUTPATIENT)
Dept: FAMILY MEDICINE CLINIC | Age: 64
End: 2024-06-26

## 2024-06-26 NOTE — TELEPHONE ENCOUNTER
Called pt and scheduled appt 6/27/24.    ----- Message from BERYL Zhang CNP sent at 6/25/2024  4:45 PM EDT -----  Needs ER follow up    ----- Message -----  From: Tianna Mendoza MD  Sent: 6/25/2024   9:36 AM EDT  To: BERYL Zhang CNP    Just FYI, patient left AMA but was well informed of risk...  ----- Message -----  From: Claudio Gonzalez DO  Sent: 6/22/2024  12:44 PM EDT  To: Tianna Mendoza MD

## 2024-06-27 ENCOUNTER — OFFICE VISIT (OUTPATIENT)
Dept: FAMILY MEDICINE CLINIC | Age: 64
End: 2024-06-27
Payer: COMMERCIAL

## 2024-06-27 VITALS
SYSTOLIC BLOOD PRESSURE: 110 MMHG | OXYGEN SATURATION: 97 % | TEMPERATURE: 97.9 F | RESPIRATION RATE: 18 BRPM | WEIGHT: 181 LBS | DIASTOLIC BLOOD PRESSURE: 68 MMHG | BODY MASS INDEX: 27.43 KG/M2 | HEIGHT: 68 IN | HEART RATE: 78 BPM

## 2024-06-27 DIAGNOSIS — Z86.39: Primary | ICD-10-CM

## 2024-06-27 DIAGNOSIS — Z86.39: ICD-10-CM
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
## 2024-06-27 PROBLEM — B37.31 VAGINAL CANDIDA: Status: RESOLVED | Noted: 2024-05-20 | Resolved: 2024-06-27

## 2024-06-27 LAB
ANION GAP SERPL CALCULATED.3IONS-SCNC: 20 MMOL/L (ref 7–16)
BUN BLDV-MCNC: 19 MG/DL (ref 6–23)
CALCIUM SERPL-MCNC: 9 MG/DL (ref 8.6–10.2)
CHLORIDE BLD-SCNC: 90 MMOL/L (ref 98–107)
CO2: 16 MMOL/L (ref 22–29)
CREAT SERPL-MCNC: 0.9 MG/DL (ref 0.5–1)
GFR, ESTIMATED: 75 ML/MIN/1.73M2
GLUCOSE BLD-MCNC: 92 MG/DL (ref 74–99)
POTASSIUM SERPL-SCNC: 4 MMOL/L (ref 3.5–5)
SODIUM BLD-SCNC: 126 MMOL/L (ref 132–146)

## 2024-06-27 PROCEDURE — 99214 OFFICE O/P EST MOD 30 MIN: CPT | Performed by: NURSE PRACTITIONER

## 2024-06-27 ASSESSMENT — ENCOUNTER SYMPTOMS
SHORTNESS OF BREATH: 1
COUGH: 0
WHEEZING: 0

## 2024-06-27 NOTE — ASSESSMENT & PLAN NOTE
Diagnosed during last hospital admission  Not atrial fibrillation at this time  Not on any rate control or anticoagulation  Monitor heart rate  Repeat lab today due to hypokalemia and hyponatremia. She is drinking more water with an electrolyte packet. Hypokalemia has improved and the hyponatremia is slowly improving.

## 2024-06-27 NOTE — PROGRESS NOTES
06/27/2024    GLUCOSE 92 06/27/2024    CALCIUM 9.0 06/27/2024    BILITOT 0.3 06/21/2024    ALKPHOS 115 (H) 06/21/2024    AST 23 06/21/2024    ALT 21 06/21/2024    LABGLOM 75 06/27/2024    GFRAA >60 11/11/2021       Lab Results   Component Value Date/Time     06/27/2024 10:57 AM    K 4.0 06/27/2024 10:57 AM    CL 90 06/27/2024 10:57 AM    CO2 16 06/27/2024 10:57 AM    BUN 19 06/27/2024 10:57 AM    CREATININE 0.9 06/27/2024 10:57 AM    GLUCOSE 92 06/27/2024 10:57 AM    CALCIUM 9.0 06/27/2024 10:57 AM    LABGLOM 75 06/27/2024 10:57 AM    LABGLOM >60 02/14/2024 09:43 AM      Lab Results   Component Value Date/Time    MG 1.2 06/21/2024 04:19 PM     Lab Results   Component Value Date    TROPHS 22 (H) 06/21/2024     Lab Results   Component Value Date     (L) 06/27/2024    K 4.0 06/27/2024    CL 90 (L) 06/27/2024    CO2 16 (L) 06/27/2024    BUN 19 06/27/2024    CREATININE 0.9 06/27/2024    GLUCOSE 92 06/27/2024    CALCIUM 9.0 06/27/2024    BILITOT 0.3 06/21/2024    ALKPHOS 115 (H) 06/21/2024    AST 23 06/21/2024    ALT 21 06/21/2024    LABGLOM 75 06/27/2024    GFRAA >60 11/11/2021           She was also trying to get a spider off the ceiling and she fell she had PJ bottoms and they were too long. She is complaining of aching all over the right hip, buttock, head she was wearing her sling and couldn't catch herself. She has bruising on the left side just above the groin and the right thigh/hip    Current Outpatient Medications:     methylPREDNISolone (MEDROL DOSEPACK) 4 MG tablet, Take by mouth., Disp: 1 kit, Rfl: 0    budesonide-formoterol (SYMBICORT) 160-4.5 MCG/ACT AERO, Inhale 2 puffs into the lungs 2 times daily, Disp: 11 g, Rfl: 6    Ascorbic Acid (VITAMIN C GUMMIES PO), Take 1 gum by mouth as needed, Disp: , Rfl:     Nutritional Supplements (FRUIT & VEGETABLE DAILY PO), Take 1 gum by mouth daily, Disp: , Rfl:     NONFORMULARY, Take 1 gum by mouth as needed (only takes it when she feels like it) Beet Root,

## 2024-06-27 NOTE — ASSESSMENT & PLAN NOTE
Trying to get a spider from the ceiling outside and slipped on PJ's that were too long. Non tender over the left side of the ribs no x ray indicated at this time.

## 2024-07-12 ENCOUNTER — OFFICE VISIT (OUTPATIENT)
Dept: FAMILY MEDICINE CLINIC | Age: 64
End: 2024-07-12

## 2024-07-12 VITALS
WEIGHT: 181 LBS | HEART RATE: 75 BPM | OXYGEN SATURATION: 98 % | SYSTOLIC BLOOD PRESSURE: 106 MMHG | TEMPERATURE: 97.4 F | HEIGHT: 68 IN | RESPIRATION RATE: 18 BRPM | BODY MASS INDEX: 27.43 KG/M2 | DIASTOLIC BLOOD PRESSURE: 71 MMHG

## 2024-07-12 DIAGNOSIS — T36.95XA ANTIBIOTIC-INDUCED YEAST INFECTION: ICD-10-CM

## 2024-07-12 DIAGNOSIS — B37.9 ANTIBIOTIC-INDUCED YEAST INFECTION: ICD-10-CM

## 2024-07-12 DIAGNOSIS — J45.21 MILD INTERMITTENT ASTHMA WITH ACUTE EXACERBATION: Primary | ICD-10-CM

## 2024-07-12 RX ORDER — FLUCONAZOLE 150 MG/1
TABLET ORAL
Qty: 2 TABLET | Refills: 0 | Status: SHIPPED | OUTPATIENT
Start: 2024-07-12

## 2024-07-12 RX ORDER — CEFDINIR 300 MG/1
300 CAPSULE ORAL 2 TIMES DAILY
Qty: 14 CAPSULE | Refills: 0 | Status: SHIPPED | OUTPATIENT
Start: 2024-07-12 | End: 2024-07-19

## 2024-07-12 RX ORDER — PREDNISONE 20 MG/1
20 TABLET ORAL 2 TIMES DAILY
Qty: 10 TABLET | Refills: 0 | Status: SHIPPED | OUTPATIENT
Start: 2024-07-12 | End: 2024-07-17

## 2024-07-12 RX ORDER — AZELASTINE 1 MG/ML
2 SPRAY, METERED NASAL 2 TIMES DAILY
Qty: 120 ML | Refills: 1 | Status: SHIPPED | OUTPATIENT
Start: 2024-07-12

## 2024-07-12 RX ORDER — DEXTROMETHORPHAN HYDROBROMIDE AND PROMETHAZINE HYDROCHLORIDE 15; 6.25 MG/5ML; MG/5ML
5 SYRUP ORAL 4 TIMES DAILY PRN
Qty: 118 ML | Refills: 0 | Status: SHIPPED | OUTPATIENT
Start: 2024-07-12 | End: 2024-07-19

## 2024-07-12 NOTE — PROGRESS NOTES
2024     Kenya Baocn 64 y.o. female    : 1960   Chief Complaint:   Cough (Chest congestion, sinus drainage and congestion, pressure )      History of Present Illness   Source of history provided by:  patient.    Kenya Bacon is a 64 y.o. old female who presents to walk-in for evaluation of congestion x few days. Associated symptoms include cough and drainage.  Since onset symptoms have been consistent.  Patient has had no known Covid 19 exposure.  Patient has not been diagnosed with COVID-19 in the last 90 days.  Has taken inhalers at home with some symptomatic relief. Denies any fever, chills, CP, dyspnea, LE edema, abdominal pain, nausea, vomiting, rash, dizziness, or lethargy. Denies any history of pneumonia, recurrent bronchitis or COPD.  Pt does have a hx of asthma.   They have a history of tobacco abuse.        ROS   Past Medical History:   Past Medical History:   Diagnosis Date    Allergic rhinitis     Allergic sinusitis 2021    Asthma without status asthmaticus 2023    Borderline hyperlipidemia 10/13/2022    Breast cancer ([facility])     RIGHT IDC    Cancer ([facility]) dx     melanoma left hip (had excision, chemo but no radiation)    Chronic midline low back pain with left-sided sciatica 2023    COPD (chronic obstructive pulmonary disease) ([facility])     states cant afford her symbicort inhaler    Depression     Dysuria 2021    Emphysema of lung ([facility])     Hx of blood clots     DVT rt leg while going thru chemo (was inactive)    Lumbar spondylosis 2023    MRSA (methicillin resistant Staphylococcus aureus) infection 2021    Tinea versicolor 2021    Ulcerative colitis ([facility])     Urinary tract infection with hematuria 2021     Past Surgical History:  has a past surgical history that includes other surgical history (); Cosmetic surgery (); Colonoscopy; Abdomen surgery; skin biopsy; other surgical history (Bilateral, 2021); myringotomy

## 2024-07-23 ENCOUNTER — TELEPHONE (OUTPATIENT)
Dept: ONCOLOGY | Age: 64
End: 2024-07-23

## 2024-07-23 DIAGNOSIS — E87.1 HYPONATREMIA: Primary | ICD-10-CM

## 2024-07-23 NOTE — TELEPHONE ENCOUNTER
Followed up with patient by phone. She is feeling better since I last saw her.   She had seen PCP acknowledging her hyponatremia.   I offered patient referral to nephrology as well as endocrinology due there hyponatremia and ACTH/cortisol levels.   Pt is not very keen on this as she is feeling much better of late.   But I noted I will order another BMP to recheck sodium.

## 2024-09-20 ENCOUNTER — OFFICE VISIT (OUTPATIENT)
Dept: ONCOLOGY | Age: 64
End: 2024-09-20
Payer: COMMERCIAL

## 2024-09-20 ENCOUNTER — HOSPITAL ENCOUNTER (OUTPATIENT)
Dept: INFUSION THERAPY | Age: 64
Discharge: HOME | End: 2024-09-20
Payer: COMMERCIAL

## 2024-09-20 VITALS
TEMPERATURE: 97.6 F | HEART RATE: 81 BPM | OXYGEN SATURATION: 100 % | DIASTOLIC BLOOD PRESSURE: 79 MMHG | SYSTOLIC BLOOD PRESSURE: 107 MMHG

## 2024-09-20 DIAGNOSIS — R79.89 LOW SERUM ADRENOCORTICOTROPHIC HORMONE (ACTH): ICD-10-CM

## 2024-09-20 DIAGNOSIS — C50.911 INVASIVE DUCTAL CARCINOMA OF RIGHT BREAST (HCC): Primary | ICD-10-CM

## 2024-09-20 DIAGNOSIS — E87.1 HYPONATREMIA: Primary | ICD-10-CM

## 2024-09-20 DIAGNOSIS — E87.1 HYPONATREMIA: ICD-10-CM

## 2024-09-20 LAB
ALBUMIN SERPL-MCNC: 4.2 G/DL (ref 3.5–5.2)
ALP SERPL-CCNC: 99 U/L (ref 35–104)
ALT SERPL-CCNC: 20 U/L (ref 0–32)
ANION GAP SERPL CALCULATED.3IONS-SCNC: 15 MMOL/L (ref 7–16)
AST SERPL-CCNC: 23 U/L (ref 0–31)
BASOPHILS # BLD: 0.08 K/UL (ref 0–0.2)
BASOPHILS NFR BLD: 1 % (ref 0–2)
BILIRUB SERPL-MCNC: 0.4 MG/DL (ref 0–1.2)
BUN SERPL-MCNC: 32 MG/DL (ref 6–23)
CALCIUM SERPL-MCNC: 9.5 MG/DL (ref 8.6–10.2)
CHLORIDE SERPL-SCNC: 89 MMOL/L (ref 98–107)
CO2 SERPL-SCNC: 16 MMOL/L (ref 22–29)
CREAT SERPL-MCNC: 1.1 MG/DL (ref 0.5–1)
EOSINOPHIL # BLD: 0.11 K/UL (ref 0.05–0.5)
EOSINOPHILS RELATIVE PERCENT: 1 % (ref 0–6)
ERYTHROCYTE [DISTWIDTH] IN BLOOD BY AUTOMATED COUNT: 12.9 % (ref 11.5–15)
GFR, ESTIMATED: 59 ML/MIN/1.73M2
GLUCOSE SERPL-MCNC: 133 MG/DL (ref 74–99)
HCT VFR BLD AUTO: 41.3 % (ref 34–48)
HGB BLD-MCNC: 14.5 G/DL (ref 11.5–15.5)
IMM GRANULOCYTES # BLD AUTO: 0.09 K/UL (ref 0–0.58)
IMM GRANULOCYTES NFR BLD: 1 % (ref 0–5)
LYMPHOCYTES NFR BLD: 1.14 K/UL (ref 1.5–4)
LYMPHOCYTES RELATIVE PERCENT: 11 % (ref 20–42)
MCH RBC QN AUTO: 33.6 PG (ref 26–35)
MCHC RBC AUTO-ENTMCNC: 35.1 G/DL (ref 32–34.5)
MCV RBC AUTO: 95.6 FL (ref 80–99.9)
MONOCYTES NFR BLD: 0.94 K/UL (ref 0.1–0.95)
MONOCYTES NFR BLD: 9 % (ref 2–12)
NEUTROPHILS NFR BLD: 78 % (ref 43–80)
NEUTS SEG NFR BLD: 8.5 K/UL (ref 1.8–7.3)
PLATELET CONFIRMATION: NORMAL
PLATELET, FLUORESCENCE: 216 K/UL (ref 130–450)
PMV BLD AUTO: 9 FL (ref 7–12)
POTASSIUM SERPL-SCNC: 4.5 MMOL/L (ref 3.5–5)
PROT SERPL-MCNC: 7.6 G/DL (ref 6.4–8.3)
RBC # BLD AUTO: 4.32 M/UL (ref 3.5–5.5)
SODIUM SERPL-SCNC: 120 MMOL/L (ref 132–146)
WBC OTHER # BLD: 10.9 K/UL (ref 4.5–11.5)

## 2024-09-20 PROCEDURE — 36415 COLL VENOUS BLD VENIPUNCTURE: CPT

## 2024-09-20 PROCEDURE — 99213 OFFICE O/P EST LOW 20 MIN: CPT | Performed by: STUDENT IN AN ORGANIZED HEALTH CARE EDUCATION/TRAINING PROGRAM

## 2024-09-20 PROCEDURE — 80053 COMPREHEN METABOLIC PANEL: CPT

## 2024-09-20 PROCEDURE — 85025 COMPLETE CBC W/AUTO DIFF WBC: CPT

## 2024-10-03 DIAGNOSIS — C50.911 INVASIVE DUCTAL CARCINOMA OF RIGHT BREAST (HCC): ICD-10-CM

## 2024-10-03 LAB
ALBUMIN SERPL-MCNC: 4.1 G/DL (ref 3.5–5.2)
ALP SERPL-CCNC: 88 U/L (ref 35–104)
ALT SERPL-CCNC: 23 U/L (ref 0–32)
ANION GAP SERPL CALCULATED.3IONS-SCNC: 20 MMOL/L (ref 7–16)
AST SERPL-CCNC: 24 U/L (ref 0–31)
BASOPHILS # BLD: 0.07 K/UL (ref 0–0.2)
BASOPHILS NFR BLD: 1 % (ref 0–2)
BILIRUB SERPL-MCNC: 0.3 MG/DL (ref 0–1.2)
BUN SERPL-MCNC: 43 MG/DL (ref 6–23)
CALCIUM SERPL-MCNC: 9.3 MG/DL (ref 8.6–10.2)
CHLORIDE SERPL-SCNC: 93 MMOL/L (ref 98–107)
CO2 SERPL-SCNC: 17 MMOL/L (ref 22–29)
CREAT SERPL-MCNC: 1.5 MG/DL (ref 0.5–1)
EOSINOPHIL # BLD: 0.04 K/UL (ref 0.05–0.5)
EOSINOPHILS RELATIVE PERCENT: 1 % (ref 0–6)
ERYTHROCYTE [DISTWIDTH] IN BLOOD BY AUTOMATED COUNT: 13.4 % (ref 11.5–15)
GFR, ESTIMATED: 38 ML/MIN/1.73M2
GLUCOSE SERPL-MCNC: 137 MG/DL (ref 74–99)
HCT VFR BLD AUTO: 37.5 % (ref 34–48)
HGB BLD-MCNC: 12.6 G/DL (ref 11.5–15.5)
IMM GRANULOCYTES # BLD AUTO: 0.11 K/UL (ref 0–0.58)
IMM GRANULOCYTES NFR BLD: 1 % (ref 0–5)
LYMPHOCYTES NFR BLD: 0.84 K/UL (ref 1.5–4)
LYMPHOCYTES RELATIVE PERCENT: 10 % (ref 20–42)
MCH RBC QN AUTO: 32.9 PG (ref 26–35)
MCHC RBC AUTO-ENTMCNC: 33.6 G/DL (ref 32–34.5)
MCV RBC AUTO: 97.9 FL (ref 80–99.9)
MONOCYTES NFR BLD: 0.57 K/UL (ref 0.1–0.95)
MONOCYTES NFR BLD: 7 % (ref 2–12)
NEUTROPHILS NFR BLD: 81 % (ref 43–80)
NEUTS SEG NFR BLD: 6.91 K/UL (ref 1.8–7.3)
PLATELET # BLD AUTO: 286 K/UL (ref 130–450)
PMV BLD AUTO: 9.5 FL (ref 7–12)
POTASSIUM SERPL-SCNC: 4 MMOL/L (ref 3.5–5)
PROT SERPL-MCNC: 7 G/DL (ref 6.4–8.3)
RBC # BLD AUTO: 3.83 M/UL (ref 3.5–5.5)
SODIUM SERPL-SCNC: 130 MMOL/L (ref 132–146)
WBC OTHER # BLD: 8.5 K/UL (ref 4.5–11.5)

## 2024-11-08 ENCOUNTER — OFFICE VISIT (OUTPATIENT)
Dept: FAMILY MEDICINE CLINIC | Age: 64
End: 2024-11-08

## 2024-11-08 VITALS
DIASTOLIC BLOOD PRESSURE: 70 MMHG | SYSTOLIC BLOOD PRESSURE: 108 MMHG | HEART RATE: 80 BPM | WEIGHT: 166.8 LBS | BODY MASS INDEX: 25.28 KG/M2 | RESPIRATION RATE: 16 BRPM | OXYGEN SATURATION: 100 % | HEIGHT: 68 IN | TEMPERATURE: 97 F

## 2024-11-08 DIAGNOSIS — J44.9 CHRONIC OBSTRUCTIVE PULMONARY DISEASE, UNSPECIFIED COPD TYPE (HCC): ICD-10-CM

## 2024-11-08 DIAGNOSIS — K51.919 ULCERATIVE COLITIS WITH COMPLICATION, UNSPECIFIED LOCATION (HCC): ICD-10-CM

## 2024-11-08 DIAGNOSIS — E87.1 HYPONATREMIA: ICD-10-CM

## 2024-11-08 DIAGNOSIS — M81.0 AGE-RELATED OSTEOPOROSIS WITHOUT CURRENT PATHOLOGICAL FRACTURE: Primary | ICD-10-CM

## 2024-11-08 DIAGNOSIS — R25.2 MUSCLE CRAMPS: ICD-10-CM

## 2024-11-08 DIAGNOSIS — F17.210 CIGARETTE SMOKER: ICD-10-CM

## 2024-11-08 RX ORDER — RIBOFLAVIN (VITAMIN B2) 100 MG
1 TABLET ORAL 3 TIMES DAILY
COMMUNITY

## 2024-11-08 RX ORDER — SODIUM CHLORIDE 1 G/1
1 TABLET ORAL 3 TIMES DAILY
COMMUNITY
Start: 2024-10-03 | End: 2025-10-03

## 2024-11-08 NOTE — PROGRESS NOTES
[facility]  [address], [place] [address]  Phone: [phone]        Patient:  Kenya Bacon 64 y.o. female          Chief complaint:   Chief Complaint   Patient presents with    New Patient     Previous PCP Alley Cruz     cramping     Pt c/o bilateral hand and bilateral ankle cramping.         Assessment and Plan     Kenya \"Rena\" was seen today for new patient and cramping.    Diagnoses and all orders for this visit:    Age-related osteoporosis without current pathological fracture  Chronic and not controlled  Declines bisphosphonate therapy as she had too many side effects in the past  -     External Referral To Endocrinology    Hyponatremia  Chronic and not currently controlled  Recently established with Dr. Mai with nephrology  Taking salt tabs 1 g 3 times daily for now  Did sign out of hospital AMA for hyponatremia of 120  -     External Referral To Endocrinology    Ulcerative colitis with complication, unspecified location (HCC)  Historical issue status post colectomy and BCIR pouch placement  Has not establish care with local GI  Will need follow-up for continued care and management of her pouch  -     Amb External Referral To Gastroenterology    Muscle cramps  Subacute and not controlled  Suspect in relation to electrolyte imbalance  Hydration advised  Add mag level  -     Magnesium; Future    Chronic obstructive pulmonary disease, unspecified COPD type (HCC)  Cigarette smoker  Chronic and not controlled  Cessation recommended  Continue current inhaler therapy        Please see Patient Instructions for further counseling and information given.        Advised to please be adherent to the treatment plans discussed today, and please call with any questions or concerns, letting the office know of any reasons that the plans may not be followed.  The risks of untreated conditions include worsening illness, injury, disability, and possibly, death. Please call if

## 2024-11-09 LAB — MAGNESIUM: 1.4 MG/DL (ref 1.6–2.6)

## 2024-11-14 LAB
ANION GAP SERPL CALCULATED.3IONS-SCNC: 14 MMOL/L (ref 7–16)
BUN SERPL-MCNC: 24 MG/DL (ref 6–23)
CALCIUM SERPL-MCNC: 9 MG/DL (ref 8.6–10.2)
CHLORIDE SERPL-SCNC: 98 MMOL/L (ref 98–107)
CO2 SERPL-SCNC: 16 MMOL/L (ref 22–29)
CREAT SERPL-MCNC: 1 MG/DL (ref 0.5–1)
GFR, ESTIMATED: 67 ML/MIN/1.73M2
GLUCOSE SERPL-MCNC: 114 MG/DL (ref 74–99)
OSMOLALITY SERPL: 286 MOSM/KG (ref 285–310)
OSMOLALITY UR: 634 MOSM/KG (ref 300–900)
POTASSIUM SERPL-SCNC: 4.6 MMOL/L (ref 3.5–5)
SODIUM SERPL-SCNC: 128 MMOL/L (ref 132–146)
SODIUM UR-SCNC: <20 MMOL/L

## 2024-12-20 ENCOUNTER — OFFICE VISIT (OUTPATIENT)
Dept: ONCOLOGY | Age: 64
End: 2024-12-20
Payer: COMMERCIAL

## 2024-12-20 ENCOUNTER — HOSPITAL ENCOUNTER (OUTPATIENT)
Dept: INFUSION THERAPY | Age: 64
Discharge: HOME | End: 2024-12-20
Payer: COMMERCIAL

## 2024-12-20 VITALS
HEIGHT: 67 IN | DIASTOLIC BLOOD PRESSURE: 77 MMHG | OXYGEN SATURATION: 96 % | BODY MASS INDEX: 26.23 KG/M2 | HEART RATE: 63 BPM | TEMPERATURE: 97.7 F | SYSTOLIC BLOOD PRESSURE: 117 MMHG | WEIGHT: 167.1 LBS

## 2024-12-20 DIAGNOSIS — C50.911 INVASIVE DUCTAL CARCINOMA OF RIGHT BREAST (HCC): Primary | ICD-10-CM

## 2024-12-20 DIAGNOSIS — C50.911 INVASIVE DUCTAL CARCINOMA OF RIGHT BREAST (HCC): ICD-10-CM

## 2024-12-20 LAB
ALBUMIN SERPL-MCNC: 3.9 G/DL (ref 3.5–5.2)
ALP SERPL-CCNC: 106 U/L (ref 35–104)
ALT SERPL-CCNC: 22 U/L (ref 0–32)
ANION GAP SERPL CALCULATED.3IONS-SCNC: 12 MMOL/L (ref 7–16)
AST SERPL-CCNC: 27 U/L (ref 0–31)
BASOPHILS # BLD: 0.06 K/UL (ref 0–0.2)
BASOPHILS NFR BLD: 1 % (ref 0–2)
BILIRUB SERPL-MCNC: 0.3 MG/DL (ref 0–1.2)
BUN SERPL-MCNC: 23 MG/DL (ref 6–23)
CALCIUM SERPL-MCNC: 8.9 MG/DL (ref 8.6–10.2)
CHLORIDE SERPL-SCNC: 103 MMOL/L (ref 98–107)
CO2 SERPL-SCNC: 18 MMOL/L (ref 22–29)
CREAT SERPL-MCNC: 0.9 MG/DL (ref 0.5–1)
EOSINOPHIL # BLD: 0.11 K/UL (ref 0.05–0.5)
EOSINOPHILS RELATIVE PERCENT: 1 % (ref 0–6)
ERYTHROCYTE [DISTWIDTH] IN BLOOD BY AUTOMATED COUNT: 13.5 % (ref 11.5–15)
GFR, ESTIMATED: 70 ML/MIN/1.73M2
GLUCOSE SERPL-MCNC: 129 MG/DL (ref 74–99)
HCT VFR BLD AUTO: 36.1 % (ref 34–48)
HGB BLD-MCNC: 12.5 G/DL (ref 11.5–15.5)
IMM GRANULOCYTES # BLD AUTO: 0.04 K/UL (ref 0–0.58)
IMM GRANULOCYTES NFR BLD: 1 % (ref 0–5)
LYMPHOCYTES NFR BLD: 0.79 K/UL (ref 1.5–4)
LYMPHOCYTES RELATIVE PERCENT: 9 % (ref 20–42)
MCH RBC QN AUTO: 34.3 PG (ref 26–35)
MCHC RBC AUTO-ENTMCNC: 34.6 G/DL (ref 32–34.5)
MCV RBC AUTO: 99.2 FL (ref 80–99.9)
MONOCYTES NFR BLD: 0.77 K/UL (ref 0.1–0.95)
MONOCYTES NFR BLD: 9 % (ref 2–12)
NEUTROPHILS NFR BLD: 79 % (ref 43–80)
NEUTS SEG NFR BLD: 6.64 K/UL (ref 1.8–7.3)
PLATELET # BLD AUTO: 247 K/UL (ref 130–450)
PMV BLD AUTO: 9.5 FL (ref 7–12)
POTASSIUM SERPL-SCNC: 3.7 MMOL/L (ref 3.5–5)
PROT SERPL-MCNC: 6.8 G/DL (ref 6.4–8.3)
RBC # BLD AUTO: 3.64 M/UL (ref 3.5–5.5)
SODIUM SERPL-SCNC: 133 MMOL/L (ref 132–146)
WBC OTHER # BLD: 8.4 K/UL (ref 4.5–11.5)

## 2024-12-20 PROCEDURE — 80053 COMPREHEN METABOLIC PANEL: CPT

## 2024-12-20 PROCEDURE — 99213 OFFICE O/P EST LOW 20 MIN: CPT | Performed by: STUDENT IN AN ORGANIZED HEALTH CARE EDUCATION/TRAINING PROGRAM

## 2024-12-20 PROCEDURE — 36415 COLL VENOUS BLD VENIPUNCTURE: CPT

## 2024-12-20 PROCEDURE — 85025 COMPLETE CBC W/AUTO DIFF WBC: CPT

## 2024-12-20 NOTE — PROGRESS NOTES
found that her sodium level is 126.  Patient does not wish to go to the ER.  I was able to checkserum osmolality, and urine sodium and osmolality, which appear to suggest hypovolemic hyponatremia.  Patient suggest that she is eating and drinking fairly well.  She drinks a lot of tea.  Consider still recovering from her recent illness earlier this month, and that she may still be volume depleted.    06/21/2024: Today patient returns to review MRI.  No overt evidence of stroke.  However at the roof of the left orbit, there appears to be a 10 mm lesion, likely meningioma, causing minimal mass effect.  I am uncertain if associate with her visual symptoms.  There is also mention of complex cyst of right lateral maxilla, noted on previous CT facial bones from 2021.  Otherwise patient continues to have muscular symptoms, which she appears to be described as spasms.  And may well be having visual symptoms, although patient cannot clearly tell me this.  She was very tearful, and concern over decrease in quality of life.  I readdressed her hyponatremia.  She has not felt better since given fluids, which I presumed may have been from dehydration at the time of last visit.  But patient is eating and drinking well.  I expressed my concern for underlying issue.  I reviewed previous blood and urine studies.  Consider possible SIADH.  She did not draw labs prior to coming in, so we addressed checking for additional tests including repeat CMP, TSH, cortisol level, ACTH.    09/20/2024: returns for surveillance for her breast cancer; again does not wish to be on AI/ET. Most alarming issue today is persistent hyponatremia with sodium of 120 today. She is symptomatic. Denies of new meds. Med list reviewed. She has BCIR, denies of increased output over the last several months. Denies increases in water intake. States she drinks electrolyte water, and tea x3 per day. In recent months, she has completed MRI brain and CT c/a/p, without over

## 2025-01-03 DIAGNOSIS — J43.8 OTHER EMPHYSEMA (HCC): ICD-10-CM

## 2025-01-03 RX ORDER — BUDESONIDE AND FORMOTEROL FUMARATE DIHYDRATE 160; 4.5 UG/1; UG/1
2 AEROSOL RESPIRATORY (INHALATION) 2 TIMES DAILY
Qty: 10.2 G | Refills: 0 | Status: SHIPPED | OUTPATIENT
Start: 2025-01-03

## 2025-01-03 NOTE — TELEPHONE ENCOUNTER
Name of Medication(s) Requested:  Requested Prescriptions     Pending Prescriptions Disp Refills    budesonide-formoterol (SYMBICORT) 160-4.5 MCG/ACT AERO [Pharmacy Med Name: Budesonide-Formoterol Fumarate Inhalation Aerosol 160-4.5 MCG/ACT] 10.2 g 0     Sig: INHALE 2 PUFFS INTO THE LUNGS TWICE DAILY       Medication is on current medication list Yes    Dosage and directions were verified? Yes    Quantity verified: 30 day supply     Pharmacy Verified?  Yes    Last Appointment:  6/27/2024    Future appts:  Future Appointments   Date Time Provider Department Center   2/17/2025 10:00 AM [facility] ABDU [facility] RM 1 [facility] ABDU [facility] Rad/Car   2/17/2025 11:00 AM Jermaine Uriarte MD [facility]   2/26/2025 10:00 AM [facility]   6/20/2025 10:00 AM [facility] LABS ROOM MEDICAL ONCOLOGY [facility] MED ONC [place]   6/20/2025 10:45 AM Dilshad Gregorio MD [facility]        (If no appt send self scheduling link. .REFILLAPPT)  Scheduling request sent?     [] Yes  [x] No    Does patient need updated?  [] Yes  [x] No

## 2025-01-08 DIAGNOSIS — Z12.31 ENCOUNTER FOR SCREENING MAMMOGRAM FOR BREAST CANCER: Primary | ICD-10-CM

## 2025-02-17 ENCOUNTER — OFFICE VISIT (OUTPATIENT)
Dept: BREAST CENTER | Age: 65
End: 2025-02-17
Payer: COMMERCIAL

## 2025-02-17 ENCOUNTER — OFFICE VISIT (OUTPATIENT)
Dept: FAMILY MEDICINE CLINIC | Age: 65
End: 2025-02-17
Payer: COMMERCIAL

## 2025-02-17 ENCOUNTER — HOSPITAL ENCOUNTER (OUTPATIENT)
Dept: GENERAL RADIOLOGY | Age: 65
Discharge: HOME | End: 2025-02-19
Payer: COMMERCIAL

## 2025-02-17 VITALS
TEMPERATURE: 97.5 F | HEIGHT: 68 IN | SYSTOLIC BLOOD PRESSURE: 128 MMHG | WEIGHT: 170 LBS | OXYGEN SATURATION: 97 % | BODY MASS INDEX: 25.76 KG/M2 | RESPIRATION RATE: 19 BRPM | DIASTOLIC BLOOD PRESSURE: 82 MMHG | HEART RATE: 78 BPM

## 2025-02-17 VITALS
WEIGHT: 170 LBS | SYSTOLIC BLOOD PRESSURE: 110 MMHG | BODY MASS INDEX: 26.68 KG/M2 | OXYGEN SATURATION: 93 % | RESPIRATION RATE: 16 BRPM | TEMPERATURE: 98.1 F | HEART RATE: 90 BPM | DIASTOLIC BLOOD PRESSURE: 73 MMHG | HEIGHT: 67 IN

## 2025-02-17 VITALS — WEIGHT: 170 LBS | BODY MASS INDEX: 26.63 KG/M2

## 2025-02-17 DIAGNOSIS — J44.1 COPD EXACERBATION (HCC): Primary | ICD-10-CM

## 2025-02-17 DIAGNOSIS — J06.9 UPPER RESPIRATORY TRACT INFECTION, UNSPECIFIED TYPE: ICD-10-CM

## 2025-02-17 DIAGNOSIS — R68.89 FLU-LIKE SYMPTOMS: ICD-10-CM

## 2025-02-17 DIAGNOSIS — Z12.31 ENCOUNTER FOR SCREENING MAMMOGRAM FOR BREAST CANCER: ICD-10-CM

## 2025-02-17 DIAGNOSIS — Z85.3 PERSONAL HISTORY OF BREAST CANCER: Primary | ICD-10-CM

## 2025-02-17 LAB
INFLUENZA A ANTIBODY: NEGATIVE
INFLUENZA B ANTIBODY: NEGATIVE

## 2025-02-17 PROCEDURE — 99213 OFFICE O/P EST LOW 20 MIN: CPT | Performed by: SURGERY

## 2025-02-17 PROCEDURE — 77063 BREAST TOMOSYNTHESIS BI: CPT

## 2025-02-17 PROCEDURE — 99213 OFFICE O/P EST LOW 20 MIN: CPT

## 2025-02-17 PROCEDURE — 87804 INFLUENZA ASSAY W/OPTIC: CPT

## 2025-02-17 PROCEDURE — 99212 OFFICE O/P EST SF 10 MIN: CPT | Performed by: SURGERY

## 2025-02-17 RX ORDER — AZITHROMYCIN 250 MG/1
TABLET, FILM COATED ORAL
Qty: 6 TABLET | Refills: 0 | Status: SHIPPED | OUTPATIENT
Start: 2025-02-17

## 2025-02-17 RX ORDER — PREDNISONE 20 MG/1
40 TABLET ORAL DAILY
Qty: 10 TABLET | Refills: 0 | Status: SHIPPED | OUTPATIENT
Start: 2025-02-17 | End: 2025-02-22

## 2025-02-17 NOTE — PROGRESS NOTES
Date of visit: 2/17/2025 01/16/24 02/17/25    DIAGNOSIS:  1. (01/16/24) RIGHT (12:00-3) invasive ductal carcinoma. Grade 2  Clinical stage: [id number]  ER (95) CA (95) HER2 (0)  2. (2013) Personal history of melanoma  * Hip  3. Personal history of DVT    TREATMENT  1. (02/07/24) RIGHT lumpectomy and SLNB  2. Adjuvant RT  3. AI discontinued    IMAGING/PROCEDURES:  1. (10/24/23) BILATERAL st-mammogram: BIRADS-0  * RIGHT spiculated mass  2. (12/18/23) RIGHT US: BIRADS-4  * RIGHT (12:00-3) 1.1cm mass  * RIGHT axillary node 2.3cm  3. (01/05/24) RIGHT US biopsy  4. (02/17/25) BILATERAL st-mammogram: BIRADS-    PREVISIT PLAN:  Orbit lesions meningioma?  Hyponatremia  Same day    Breast History  Kenya Bacon was in the office for her routine follow-up.    Kenya was diagnosed with an early stage and biologically favorable breast cancer 1 year ago.  The patient underwent lumpectomy and sentinel node biopsy.  She completed adjuvant breast radiation.  She declined hormonal therapy.    Breast Symptoms  Kenya notes some intermittent right axillary discomfort.  She has no other symptoms to report.    Breast Examination  A comprehensive breast examination demonstrates no concerns in either breast or axilla.    Breast Imaging  Kenya underwent a screening mammogram today prior to her office visit.  This was read as benign.  I did independently review these images.  I see KLA post therapy changes.    Assessment/Plan  Kenya Bacon is in the office for a scheduled visit.  I did spend 15 minutes on this visit over half of which was dedicated to education counseling and decision making.  Some of this time does include EMR and/or imaging review outside of the room time.    I informed Kenya that she has no evidence of new or recurrent disease at this point.    I suggest that she continue annual routine surveillance.    Also of note today the patient was somewhat short of breath.  I encouraged her to contact her PCP if

## 2025-02-17 NOTE — PROGRESS NOTES
Chief Complaint   Cough (Chest congestion and sinus drainage and congestion )      HPI   Source of history provided by: patient.      Kenya Bacon is a 64 y.o. old female who presents to walk-in care for evaluation of above chief complaint X 2 days days. Associated symptoms include chills, nasal congestion, cough, chest congestion, and shortness of breath Since onset symptoms have worsened.  The patient is not vaccinated for COVID or influenza. Has taken daly pot, nebulizer treatments at home with some symptomatic relief. Denies fever, headache, sore throat, chest pain, abdominal discomfort, nausea, vomiting, body aches, otalgia, and malaise. Pertinent PMH of: PMHpositive: asthma and COPD.  Denies any PMH of URIhistory: recurrent bronchitis and pneumonia. The patient is a current everyday smoker.    ROS   Pertinent positives and negatives are stated within HPI, all other systems reviewed and are negative.  Past Medical History:  has a past medical history of Allergic rhinitis, Allergic sinusitis, Asthma without status asthmaticus, Borderline hyperlipidemia, Breast cancer (HCC), Cancer (HCC), Chronic midline low back pain with left-sided sciatica, COPD (chronic obstructive pulmonary disease) (HCC), Depression, Dysuria, Emphysema of lung (HCC), History of therapeutic radiation, Hx of blood clots, Lumbar spondylosis, MRSA (methicillin resistant Staphylococcus aureus) infection, Tinea versicolor, Ulcerative colitis (HCC), and Urinary tract infection with hematuria.  Surgical History:  has a past surgical history that includes other surgical history (2013); Cosmetic surgery (1983); Colonoscopy; Abdomen surgery; skin biopsy; other surgical history (Bilateral, 03/11/2021); myringotomy (Bilateral, 03/11/2021); total colectomy; Pain management procedure (Bilateral, 05/15/2023); US BREAST BIOPSY W LOC DEVICE 1ST LESION RIGHT (Right, 01/05/2024); US PLACE BREAST LOC DEVICE 1ST LESION RIGHT (Right, 02/06/2024); Breast

## 2025-02-26 ENCOUNTER — OFFICE VISIT (OUTPATIENT)
Dept: FAMILY MEDICINE CLINIC | Age: 65
End: 2025-02-26

## 2025-02-26 VITALS
OXYGEN SATURATION: 96 % | BODY MASS INDEX: 27.47 KG/M2 | HEART RATE: 78 BPM | HEIGHT: 67 IN | RESPIRATION RATE: 20 BRPM | DIASTOLIC BLOOD PRESSURE: 80 MMHG | SYSTOLIC BLOOD PRESSURE: 129 MMHG | WEIGHT: 175 LBS | TEMPERATURE: 97.2 F

## 2025-02-26 DIAGNOSIS — Z01.419 WELL WOMAN EXAM WITH ROUTINE GYNECOLOGICAL EXAM: ICD-10-CM

## 2025-02-26 DIAGNOSIS — Z13.1 ENCOUNTER FOR SCREENING FOR DIABETES MELLITUS: Primary | ICD-10-CM

## 2025-02-26 DIAGNOSIS — J45.21 MILD INTERMITTENT ASTHMA WITH ACUTE EXACERBATION: ICD-10-CM

## 2025-02-26 DIAGNOSIS — I49.9 CARDIAC ARRHYTHMIA, UNSPECIFIED CARDIAC ARRHYTHMIA TYPE: ICD-10-CM

## 2025-02-26 DIAGNOSIS — M25.512 CHRONIC LEFT SHOULDER PAIN: ICD-10-CM

## 2025-02-26 DIAGNOSIS — J43.8 OTHER EMPHYSEMA (HCC): ICD-10-CM

## 2025-02-26 DIAGNOSIS — J45.20 MILD INTERMITTENT ASTHMA WITHOUT STATUS ASTHMATICUS WITHOUT COMPLICATION: ICD-10-CM

## 2025-02-26 DIAGNOSIS — G89.29 CHRONIC LEFT SHOULDER PAIN: ICD-10-CM

## 2025-02-26 DIAGNOSIS — E78.2 MODERATE MIXED HYPERLIPIDEMIA NOT REQUIRING STATIN THERAPY: ICD-10-CM

## 2025-02-26 RX ORDER — TIOTROPIUM BROMIDE 18 UG/1
18 CAPSULE ORAL; RESPIRATORY (INHALATION) DAILY
Qty: 90 CAPSULE | Refills: 1 | Status: SHIPPED | OUTPATIENT
Start: 2025-02-26

## 2025-02-26 RX ORDER — ALBUTEROL SULFATE 0.83 MG/ML
2.5 SOLUTION RESPIRATORY (INHALATION) EVERY 4 HOURS PRN
Qty: 120 EACH | Refills: 1 | Status: SHIPPED | OUTPATIENT
Start: 2025-02-26

## 2025-02-26 RX ORDER — AZELASTINE 1 MG/ML
2 SPRAY, METERED NASAL 2 TIMES DAILY
Qty: 120 ML | Refills: 1 | Status: SHIPPED | OUTPATIENT
Start: 2025-02-26

## 2025-02-26 RX ORDER — BUDESONIDE AND FORMOTEROL FUMARATE DIHYDRATE 160; 4.5 UG/1; UG/1
2 AEROSOL RESPIRATORY (INHALATION) 2 TIMES DAILY
Qty: 10.2 G | Refills: 0 | Status: SHIPPED | OUTPATIENT
Start: 2025-02-26

## 2025-02-26 SDOH — ECONOMIC STABILITY: FOOD INSECURITY: WITHIN THE PAST 12 MONTHS, YOU WORRIED THAT YOUR FOOD WOULD RUN OUT BEFORE YOU GOT MONEY TO BUY MORE.: NEVER TRUE

## 2025-02-26 SDOH — ECONOMIC STABILITY: FOOD INSECURITY: WITHIN THE PAST 12 MONTHS, THE FOOD YOU BOUGHT JUST DIDN'T LAST AND YOU DIDN'T HAVE MONEY TO GET MORE.: NEVER TRUE

## 2025-02-26 ASSESSMENT — PATIENT HEALTH QUESTIONNAIRE - PHQ9
SUM OF ALL RESPONSES TO PHQ QUESTIONS 1-9: 0
1. LITTLE INTEREST OR PLEASURE IN DOING THINGS: NOT AT ALL
2. FEELING DOWN, DEPRESSED OR HOPELESS: NOT AT ALL
SUM OF ALL RESPONSES TO PHQ QUESTIONS 1-9: 0

## 2025-02-26 NOTE — PATIENT INSTRUCTIONS
Adela Atkins MD MS FACG  Address: [address] [place] [id number]  Hours: Open ? Closes 5?PM  Phone: [phone]

## 2025-02-26 NOTE — PROGRESS NOTES
stress. She has been encouraged to reach out if she experiences significant anxiety or difficulty, as there are medications available to assist with smoking cessation.    4. Hyponatremia. Chronic and stable  She has a history of low sodium levels and is currently on salt pills. She has been advised to continue her current treatment and ensure regular blood work every 4 weeks to monitor her sodium levels.    5. Health Maintenance.  She is due for her pneumonia, shingles, and RSV vaccines. She has been advised to get these vaccines at her pharmacy once she feels better. A referral to Dr. Terrell's office has been made for a Pap smear. She has been advised to contact Dr. Sarkar's office to schedule an appointment. If she does not hear from them within a week or two, she should contact the office.    6. Hypercholesterolemia. Chronic and not controlled   Lab Results   Component Value Date    CHOL 250 (H) 02/14/2024    TRIG 153 (H) 02/14/2024     02/14/2024     (H) 02/14/2024    VLDL 31 02/14/2024   ASCVD is 3.3%  Rechecklabs. She has been advised to fast overnight and get her labs redrawn. Depending on the results, medication may be considered, but for now, she should focus on diet and exercise.    7. Shoulder Pain. Chronic and not controlled  She has been experiencing shoulder pain that flares up intermittently. She has been advised to try physical therapy if the pain persists. Home exercises have also been recommended.    8. Arrhythmia., chronic and not controlled  She has a history of premature atrial contractions (PACs) and extra heartbeats. A Holter monitor has been ordered to assess for any abnormal heart rhythms over a period of 3 days.      Return in about 3 months (around 5/26/2025) for copd, extended visit.         --Tianna Mendoza MD

## 2025-03-12 ENCOUNTER — HOSPITAL ENCOUNTER (OUTPATIENT)
Dept: PULMONOLOGY | Age: 65
Discharge: HOME | End: 2025-03-12
Attending: FAMILY MEDICINE

## 2025-04-14 DIAGNOSIS — J43.8 OTHER EMPHYSEMA (HCC): ICD-10-CM

## 2025-04-14 NOTE — TELEPHONE ENCOUNTER
Name of Medication(s) Requested:  Requested Prescriptions     Pending Prescriptions Disp Refills    budesonide-formoterol (SYMBICORT) 160-4.5 MCG/ACT AERO [Pharmacy Med Name: Budesonide-Formoterol Fumarate Inhalation Aerosol 160-4.5 MCG/ACT] 10.2 g 0     Sig: INHALE TWO PUFFS BY MOUTH TWO TIMES A DAY       Medication is on current medication list Yes    Dosage and directions were verified? Yes    Quantity verified: 30 day supply     Pharmacy Verified?  Yes    Last Appointment:  2/26/2025    Future appts:  Future Appointments   Date Time Provider Department Center   5/30/2025 10:30 AM [facility]   6/11/2025 10:00 AM Kaylin Navarrete MD [facility] Womens [facility]   6/20/2025 10:00 AM [facility] LABS ROOM MEDICAL ONCOLOGY [facility] MED ONC [place]   6/20/2025 10:45 AM [facility]   7/9/2025 10:40 AM [facility]   2/23/2026 10:00 AM [facility] RM 1 [facility] BC SE Rad/Car   2/23/2026 11:00 AM Jermaine Uriarte MD [facility]        (If no appt send self scheduling link. .REFILLAPPT)  Scheduling request sent?     [] Yes  [] No    Does patient need updated?  [] Yes  [] No

## 2025-04-16 RX ORDER — BUDESONIDE AND FORMOTEROL FUMARATE DIHYDRATE 160; 4.5 UG/1; UG/1
AEROSOL RESPIRATORY (INHALATION)
Qty: 10.2 G | Refills: 0 | Status: SHIPPED | OUTPATIENT
Start: 2025-04-16

## 2025-05-13 DIAGNOSIS — J43.8 OTHER EMPHYSEMA (HCC): ICD-10-CM

## 2025-05-13 RX ORDER — BUDESONIDE AND FORMOTEROL FUMARATE DIHYDRATE 160; 4.5 UG/1; UG/1
AEROSOL RESPIRATORY (INHALATION)
Qty: 10.2 G | Refills: 0 | Status: SHIPPED | OUTPATIENT
Start: 2025-05-13

## 2025-05-13 NOTE — TELEPHONE ENCOUNTER
Name of Medication(s) Requested:  Requested Prescriptions     Pending Prescriptions Disp Refills    budesonide-formoterol (SYMBICORT) 160-4.5 MCG/ACT AERO [Pharmacy Med Name: Budesonide-Formoterol Fumarate Inhalation Aerosol 160-4.5 MCG/ACT] 10.2 g 0     Sig: INHALE TWO PUFFS BY MOUTH TWO TIMES A DAY       Medication is on current medication list Yes    Dosage and directions were verified? Yes    Quantity verified: 30 day supply     Pharmacy Verified?  Yes    Last Appointment:  2/26/2025    Future appts:  Future Appointments   Date Time Provider Department Center   5/30/2025 10:30 AM [facility]   6/11/2025 10:00 AM Kaylin Navarrete MD [facility] Womens [facility]   6/20/2025 10:00 AM [facility] LABS ROOM MEDICAL ONCOLOGY [facility] MED ONC [place]   6/20/2025 10:45 AM [facility]   7/9/2025 10:40 AM [facility]   2/23/2026 10:00 AM [facility] ABDU [facility] RM 1 [facility] ABDU BC SE Rad/Car   2/23/2026 11:00 AM Jermaine Uriarte MD [facility]        (If no appt send self scheduling link. .REFILLAPPT)  Scheduling request sent?     [] Yes  [x] No    Does patient need updated?  [] Yes  [x] No

## 2025-05-14 DIAGNOSIS — Z13.1 ENCOUNTER FOR SCREENING FOR DIABETES MELLITUS: ICD-10-CM

## 2025-05-14 DIAGNOSIS — E78.2 MODERATE MIXED HYPERLIPIDEMIA NOT REQUIRING STATIN THERAPY: ICD-10-CM

## 2025-05-14 LAB
CHOLESTEROL, FASTING: 256 MG/DL
HBA1C MFR BLD: 5.6 % (ref 4–5.6)
HDLC SERPL-MCNC: 130 MG/DL
LDL CHOLESTEROL: 102 MG/DL
TRIGLYCERIDE, FASTING: 119 MG/DL
VLDLC SERPL CALC-MCNC: 24 MG/DL

## 2025-05-16 ENCOUNTER — RESULTS FOLLOW-UP (OUTPATIENT)
Dept: FAMILY MEDICINE CLINIC | Age: 65
End: 2025-05-16

## 2025-05-30 ENCOUNTER — OFFICE VISIT (OUTPATIENT)
Dept: PULMONOLOGY | Age: 65
End: 2025-05-30
Payer: COMMERCIAL

## 2025-05-30 VITALS
HEIGHT: 67 IN | SYSTOLIC BLOOD PRESSURE: 125 MMHG | RESPIRATION RATE: 14 BRPM | HEART RATE: 74 BPM | BODY MASS INDEX: 27.21 KG/M2 | OXYGEN SATURATION: 96 % | DIASTOLIC BLOOD PRESSURE: 78 MMHG | TEMPERATURE: 97.9 F | WEIGHT: 173.4 LBS

## 2025-05-30 DIAGNOSIS — F17.200 NICOTINE DEPENDENCE WITH CURRENT USE: ICD-10-CM

## 2025-05-30 DIAGNOSIS — J44.9 CHRONIC OBSTRUCTIVE PULMONARY DISEASE, UNSPECIFIED COPD TYPE (HCC): Primary | ICD-10-CM

## 2025-05-30 DIAGNOSIS — Z87.891 PERSONAL HISTORY OF TOBACCO USE: ICD-10-CM

## 2025-05-30 PROCEDURE — 99204 OFFICE O/P NEW MOD 45 MIN: CPT | Performed by: INTERNAL MEDICINE

## 2025-05-30 PROCEDURE — G0296 VISIT TO DETERM LDCT ELIG: HCPCS | Performed by: INTERNAL MEDICINE

## 2025-05-30 RX ORDER — TIOTROPIUM BROMIDE 18 UG/1
18 CAPSULE ORAL; RESPIRATORY (INHALATION) DAILY
Qty: 90 CAPSULE | Refills: 1 | Status: SHIPPED | OUTPATIENT
Start: 2025-05-30

## 2025-05-30 RX ORDER — SODIUM BICARBONATE 650 MG/1
1300 TABLET ORAL 2 TIMES DAILY
COMMUNITY
Start: 2025-05-23

## 2025-05-30 ASSESSMENT — ENCOUNTER SYMPTOMS
COUGH: 1
WHEEZING: 1
SHORTNESS OF BREATH: 1
EYES NEGATIVE: 1
ALLERGIC/IMMUNOLOGIC NEGATIVE: 1

## 2025-05-30 NOTE — PROGRESS NOTES
Progress Note    Kenya Bacon  1960    CC:Asthma        HPI:65 year old female, current smoker, has been smoking for about 38 years, smoked 0.5 packs a day and cut down but started smoking more now, about 20 pack years of smoking. She is sob on walking a flight of stairs, cough and wheezing. She is using Symbicort inhaler and hasn't started Spiriva inhaler yet. She had right Lumpectomy followed by radiation, also had surgery for Malignant Melanoma at left hip area at [facility]. History of Ulcerative colitisdisease bad bowel surgery.      Past Medical History:   Diagnosis Date    Allergic rhinitis     Allergic sinusitis 08/04/2021    Asthma without status asthmaticus 02/14/2023    Borderline hyperlipidemia 10/13/2022    Breast cancer (HCC) 2024    RIGHT IDC    Cancer ([facility]) dx 2013    melanoma left hip (had excision, chemo but no radiation)    Chronic midline low back pain with left-sided sciatica 04/19/2023    COPD (chronic obstructive pulmonary disease) ([facility])     states cant afford her symbicort inhaler    Depression     Dysuria 08/04/2021    Emphysema of lung ([facility])     History of therapeutic radiation     Hx of blood clots     DVT rt leg while going thru chemo (was inactive)    Lumbar spondylosis 04/25/2023    MRSA (methicillin resistant Staphylococcus aureus) infection 06/28/2021    Tinea versicolor 08/04/2021    Ulcerative colitis ([facility])     Urinary tract infection with hematuria 08/04/2021      Past Surgical History:   Procedure Laterality Date    ABDOMEN SURGERY      multiple d/t severe ulcerative colitis  rectum & lge intestine has been removed . only has 4 feet of small intestine left....has an internal BCIR([place] continent internal resevoir)was unable to tolerate external d/t severe ahesive allergy. has to self catherize her bowel every couple of hrs    BREAST BIOPSY Right 02/07/2024    Right breast mag seed localized lumpectomy - blue dye injection - Right axillary sentinel lymph node biopsy -- General

## 2025-06-06 LAB
ANION GAP SERPL CALCULATED.3IONS-SCNC: 14 MMOL/L (ref 7–16)
BUN SERPL-MCNC: 16 MG/DL (ref 8–23)
CALCIUM SERPL-MCNC: 9.1 MG/DL (ref 8.8–10.2)
CHLORIDE SERPL-SCNC: 106 MMOL/L (ref 98–107)
CO2 SERPL-SCNC: 19 MMOL/L (ref 22–29)
CREAT SERPL-MCNC: 0.7 MG/DL (ref 0.5–1)
GFR, ESTIMATED: >90 ML/MIN/1.73M2
GLUCOSE SERPL-MCNC: 123 MG/DL (ref 74–99)
POTASSIUM SERPL-SCNC: 3.8 MMOL/L (ref 3.5–5.1)
SODIUM SERPL-SCNC: 139 MMOL/L (ref 136–145)

## 2025-06-11 ENCOUNTER — OFFICE VISIT (OUTPATIENT)
Dept: OBGYN | Age: 65
End: 2025-06-11
Payer: COMMERCIAL

## 2025-06-11 VITALS
HEIGHT: 67 IN | BODY MASS INDEX: 26.84 KG/M2 | DIASTOLIC BLOOD PRESSURE: 73 MMHG | WEIGHT: 171 LBS | SYSTOLIC BLOOD PRESSURE: 127 MMHG | HEART RATE: 74 BPM

## 2025-06-11 DIAGNOSIS — Z01.419 ENCOUNTER FOR WELL WOMAN EXAM: ICD-10-CM

## 2025-06-11 DIAGNOSIS — Z85.3 HISTORY OF BREAST CANCER: ICD-10-CM

## 2025-06-11 DIAGNOSIS — Z12.4 SCREENING FOR CERVICAL CANCER: Primary | ICD-10-CM

## 2025-06-11 PROCEDURE — 99386 PREV VISIT NEW AGE 40-64: CPT | Performed by: OBSTETRICS & GYNECOLOGY

## 2025-06-11 NOTE — PROGRESS NOTES
Kenya Bacon     Patient presents for annual exam.     Patient had a mammogram in February. She was diagnosed with right infiltrating ductal carcinoma s/p lumpectomy 2024. She also had radiation.     Patient has a history of melanoma s/p chemotherapy 2014.     Patient has not had a pap smear in some time. Denies history of abnormal pap smears.     Patient has pain with intercourse. States she has dryness. She will use lubricants. Discussed risks and benefits of using estrogen cream in the setting of ER positive breast cancer. Recommend waiting at this time.     Past Medical History:   Diagnosis Date    Allergic rhinitis     Allergic sinusitis 08/04/2021    Asthma without status asthmaticus 02/14/2023    Borderline hyperlipidemia 10/13/2022    Breast cancer (HCC) 2024    RIGHT IDC    Cancer ([facility]) dx 2013    melanoma left hip (had excision, chemo but no radiation)    Chronic midline low back pain with left-sided sciatica 04/19/2023    COPD (chronic obstructive pulmonary disease) ([facility])     states cant afford her symbicort inhaler    Depression     Dysuria 08/04/2021    Emphysema of lung ([facility])     History of therapeutic radiation     Hx of blood clots     DVT rt leg while going thru chemo (was inactive)    Lumbar spondylosis 04/25/2023    MRSA (methicillin resistant Staphylococcus aureus) infection 06/28/2021    Tinea versicolor 08/04/2021    Ulcerative colitis ([facility])     Urinary tract infection with hematuria 08/04/2021        Past Surgical History:   Procedure Laterality Date    ABDOMEN SURGERY      multiple d/t severe ulcerative colitis  rectum & lge intestine has been removed . only has 4 feet of small intestine left....has an internal BCIR([place] continent internal resevoir)was unable to tolerate external d/t severe ahesive allergy. has to self catherize her bowel every couple of hrs    BREAST BIOPSY Right 02/07/2024    Right breast mag seed localized lumpectomy - blue dye injection - Right axillary sentinel

## 2025-06-12 LAB
HPV SAMPLE: NORMAL
HPV SOURCE: NORMAL
HPV, GENOTYPE 16: NORMAL
HPV, GENOTYPE 18: NORMAL
HPV, HIGH RISK OTHER: NORMAL
HPV, INTERPRETATION: NORMAL

## 2025-06-16 LAB
GYNECOLOGY CYTOLOGY REPORT: NORMAL
HPV SAMPLE: NORMAL
HPV SOURCE: NORMAL
HPV, GENOTYPE 16: NOT DETECTED
HPV, GENOTYPE 18: NOT DETECTED
HPV, HIGH RISK OTHER: NOT DETECTED
HPV, INTERPRETATION: NORMAL

## 2025-06-23 DIAGNOSIS — J43.8 OTHER EMPHYSEMA (HCC): ICD-10-CM

## 2025-06-23 NOTE — TELEPHONE ENCOUNTER
Name of Medication(s) Requested:  Requested Prescriptions     Pending Prescriptions Disp Refills    budesonide-formoterol (SYMBICORT) 160-4.5 MCG/ACT AERO [Pharmacy Med Name: Budesonide-Formoterol Fumarate Inhalation Aerosol 160-4.5 MCG/ACT] 10.2 g 0     Sig: INHALE TWO PUFFS BY MOUTH TWO TIMES A DAY       Medication is on current medication list Yes    Dosage and directions were verified? Yes    Quantity verified: 30 day supply     Pharmacy Verified?  Yes    Last Appointment:  2/26/2025    Future appts:  Future Appointments   Date Time Provider Department Center   7/9/2025 10:40 AM Tianna Mendoza MD [facility] ECC DEP   7/24/2025 12:00 PM [facility] CT RM 3 [facility] CT [facility] Radiolo   7/24/2025  1:00 PM [facility] PFT STRESS LAB ROOM [facility]   8/11/2025  8:15 AM Hans Black MD [place]   2/23/2026 10:00 AM [facility] ABDU [facility] RM 1 [facility] Rad[facility]Car   2/23/2026 11:00 AM Jermaine Uriarte MD [facility]   6/17/2026  9:30 AM Kaylin Navarrete MD [facility]        (If no appt send self scheduling link. .REFILLAPPT)  Scheduling request sent?     [] Yes  [x] No    Does patient need updated?  [] Yes  [x] No

## 2025-06-24 RX ORDER — BUDESONIDE AND FORMOTEROL FUMARATE DIHYDRATE 160; 4.5 UG/1; UG/1
AEROSOL RESPIRATORY (INHALATION)
Qty: 10.2 G | Refills: 0 | Status: SHIPPED | OUTPATIENT
Start: 2025-06-24

## 2025-07-24 ENCOUNTER — HOSPITAL ENCOUNTER (OUTPATIENT)
Dept: CT IMAGING | Age: 65
Discharge: HOME | End: 2025-07-24
Attending: INTERNAL MEDICINE
Payer: COMMERCIAL

## 2025-07-24 ENCOUNTER — HOSPITAL ENCOUNTER (OUTPATIENT)
Dept: PULMONOLOGY | Age: 65
Discharge: HOME | End: 2025-07-24
Attending: INTERNAL MEDICINE
Payer: COMMERCIAL

## 2025-07-24 DIAGNOSIS — Z87.891 PERSONAL HISTORY OF TOBACCO USE: ICD-10-CM

## 2025-07-24 DIAGNOSIS — J44.9 CHRONIC OBSTRUCTIVE PULMONARY DISEASE, UNSPECIFIED COPD TYPE (HCC): ICD-10-CM

## 2025-07-24 PROCEDURE — 94729 DIFFUSING CAPACITY: CPT

## 2025-07-24 PROCEDURE — 94060 EVALUATION OF WHEEZING: CPT

## 2025-07-24 PROCEDURE — 71271 CT THORAX LUNG CANCER SCR C-: CPT

## 2025-07-24 PROCEDURE — 94726 PLETHYSMOGRAPHY LUNG VOLUMES: CPT

## 2025-07-24 NOTE — PROCEDURES
[address] [address]                           PULMONARY FUNCTION      PATIENT NAME: ELIO BLUNT              : 1960  MED REC NO: [id number]                        ROOM:   ACCOUNT NO: [id number]                       ADMIT DATE: 2025  PROVIDER: Fortunato Way MD      DATE OF PROCEDURE: 2025    SURGEON:  Fortunato Way MD    REFERRING PHYSICIAN:  FORTUNATO WAY    FINDINGS:  The spirometry shows mild reduction in FVC and moderate reduction in FEV1.  The FEV1/FVC is mildly decreased.  The maximum voluntary ventilation is 55% of the predicted value.    According to Z-score criteria, FVC, FEV1 and FEV1/FVC are outside the area of curve, more than standard deviation of -1.64.  After bronchodilator therapy, there is improvement seen in the spirometry.  The lung volume shows normal TLC, but increased RV.  The diffusion capacity is normal.    IMPRESSION:  The above study shows moderate obstructive ventilatory impairment with air trapping, and there is improvement after bronchodilator therapy.          FORTUNATO WAY MD      D:  2025 14:50:04     T:  2025 15:39:08     JAILYN/AARON  Job #:  [id number]     Doc#:  [id number]

## 2025-07-28 DIAGNOSIS — J43.8 OTHER EMPHYSEMA (HCC): ICD-10-CM

## 2025-07-29 RX ORDER — BUDESONIDE AND FORMOTEROL FUMARATE DIHYDRATE 160; 4.5 UG/1; UG/1
AEROSOL RESPIRATORY (INHALATION)
Qty: 10.2 G | Refills: 2 | Status: SHIPPED | OUTPATIENT
Start: 2025-07-29

## 2025-07-29 NOTE — TELEPHONE ENCOUNTER
Name of Medication(s) Requested:  Requested Prescriptions     Pending Prescriptions Disp Refills    budesonide-formoterol (SYMBICORT) 160-4.5 MCG/ACT AERO [Pharmacy Med Name: Budesonide-Formoterol Fumarate Inhalation Aerosol 160-4.5 MCG/ACT] 10.2 g 2     Sig: INHALE TWO PUFFS BY MOUTH TWO TIMES A DAY       Medication is on current medication list Yes    Dosage and directions were verified? Yes    Quantity verified: 30 day supply     Pharmacy Verified?  Yes    Last Appointment:  2/26/2025    Future appts:  Future Appointments   Date Time Provider Department Center   8/11/2025  8:15 AM [facility]   11/21/2025 10:20 AM [facility]   2/23/2026 10:00 AM SE[facility] ABDU [facility] RM 1 [facility] A[facility] Rad[facility]Car   2/23/2026 11:00 AM Jermaine Uriarte MD [facility]   6/17/2026  9:30 AM Kaylin Navarrete MD [facility]        (If no appt send self scheduling link. .REFILLAPPT)  Scheduling request sent?     [] Yes  [x] No    Does patient need updated?  [] Yes  [x] No

## 2025-08-08 RX ORDER — ALENDRONATE SODIUM 70 MG/1
70 TABLET ORAL
COMMUNITY

## 2025-08-08 RX ORDER — CEFDINIR 300 MG/1
300 CAPSULE ORAL DAILY
COMMUNITY

## 2025-08-08 RX ORDER — FLUCONAZOLE 150 MG/1
150 TABLET ORAL ONCE
COMMUNITY

## 2025-08-08 RX ORDER — AZITHROMYCIN 250 MG/1
250 TABLET, FILM COATED ORAL DAILY
COMMUNITY

## 2025-08-08 RX ORDER — LORAZEPAM 0.5 MG/1
0.5 TABLET ORAL EVERY 6 HOURS PRN
COMMUNITY

## 2025-08-08 RX ORDER — POTASSIUM CHLORIDE 1500 MG/1
20 TABLET, EXTENDED RELEASE ORAL DAILY
COMMUNITY

## 2025-08-08 RX ORDER — BROMPHENIRAMINE MALEATE, PSEUDOEPHEDRINE HYDROCHLORIDE, AND DEXTROMETHORPHAN HYDROBROMIDE 2; 30; 10 MG/5ML; MG/5ML; MG/5ML
5 SYRUP ORAL 4 TIMES DAILY PRN
COMMUNITY

## 2025-08-11 ENCOUNTER — OFFICE VISIT (OUTPATIENT)
Dept: PULMONOLOGY | Age: 65
End: 2025-08-11
Payer: COMMERCIAL

## 2025-08-11 VITALS
TEMPERATURE: 98.2 F | HEIGHT: 68 IN | SYSTOLIC BLOOD PRESSURE: 124 MMHG | OXYGEN SATURATION: 96 % | DIASTOLIC BLOOD PRESSURE: 72 MMHG | HEART RATE: 74 BPM | WEIGHT: 173 LBS | BODY MASS INDEX: 26.22 KG/M2

## 2025-08-11 DIAGNOSIS — J44.9 CHRONIC OBSTRUCTIVE PULMONARY DISEASE, UNSPECIFIED COPD TYPE (HCC): Primary | ICD-10-CM

## 2025-08-11 DIAGNOSIS — F17.200 NICOTINE DEPENDENCE WITH CURRENT USE: ICD-10-CM

## 2025-08-11 DIAGNOSIS — J43.8 OTHER EMPHYSEMA (HCC): ICD-10-CM

## 2025-08-11 PROCEDURE — 99214 OFFICE O/P EST MOD 30 MIN: CPT | Performed by: INTERNAL MEDICINE

## 2025-08-11 RX ORDER — BUDESONIDE AND FORMOTEROL FUMARATE DIHYDRATE 160; 4.5 UG/1; UG/1
2 AEROSOL RESPIRATORY (INHALATION) 2 TIMES DAILY
Qty: 3 EACH | Refills: 2 | Status: SHIPPED | OUTPATIENT
Start: 2025-08-11

## 2025-08-11 RX ORDER — TIOTROPIUM BROMIDE 18 UG/1
18 CAPSULE ORAL; RESPIRATORY (INHALATION) DAILY
Qty: 90 CAPSULE | Refills: 1 | Status: SHIPPED | OUTPATIENT
Start: 2025-08-11

## 2025-08-11 RX ORDER — ALBUTEROL SULFATE 90 UG/1
2 INHALANT RESPIRATORY (INHALATION) EVERY 6 HOURS PRN
Qty: 2 EACH | Refills: 3 | Status: SHIPPED | OUTPATIENT
Start: 2025-08-11

## (undated) DEVICE — TOWEL OR BLUEE 16X26IN ST 8 PACK ORB08 16X26ORTWL

## (undated) DEVICE — NEEDLE HYPO 18GA L1.5IN PNK POLYPR HUB S STL THN WALL FILL

## (undated) DEVICE — MICRO TIP WIPE: Brand: DEVON

## (undated) DEVICE — 6 ML SYRINGE LUER-LOCK TIP: Brand: MONOJECT

## (undated) DEVICE — 3M™ RED DOT™ MONITORING ELECTRODE WITH FOAM TAPE AND STICKY GEL 2560, 50/BAG, 20/CASE, 72/PLT: Brand: RED DOT™

## (undated) DEVICE — NEEDLE HYPO 27GA L1.25IN GRY POLYPR HUB S STL REG BVL STR

## (undated) DEVICE — 3 ML SYRINGE LUER-LOCK TIP: Brand: MONOJECT

## (undated) DEVICE — DEVICE INFL PRSS G INDIC DISP (MUST BE PURC IN MULTIPLES OF 5)

## (undated) DEVICE — 12 ML SYRINGE,LUER-LOCK TIP: Brand: MONOJECT

## (undated) DEVICE — GLOVE ORANGE PI 7 1/2   MSG9075

## (undated) DEVICE — KNIFE SURG EAR S STL SHFT SCKL BLDE W/ POLYPR FLAT HNDL 6/PK

## (undated) DEVICE — NEEDLE SPNL 22GA L3.5IN BLK HUB S STL REG WALL FIT STYL W/

## (undated) DEVICE — SLEEVE COMPRESS STD CALF KNEE SCD

## (undated) DEVICE — PEN: MARKING STD 100/CS: Brand: MEDICAL ACTION INDUSTRIES

## (undated) DEVICE — PACKING 8004007 NEURAY 200PK 13X76MM: Brand: NEURAY ®

## (undated) DEVICE — GAUZE,SPONGE,4"X4",12PLY,STERILE,LF,2'S: Brand: MEDLINE

## (undated) DEVICE — SYSTEM BLLN DIL L16MM DIA6MM FOR EUSTACHIAN TB

## (undated) DEVICE — BANDAGE ADH W1XL3IN NAT FAB WVN FLX DURABLE N ADH PD SEAL

## (undated) DEVICE — ANTI-FOG SOLUTION WITH FOAM PAD: Brand: DEVON

## (undated) DEVICE — TOWEL,OR,DSP,ST,BLUE,STD,6/PK,12PK/CS: Brand: MEDLINE

## (undated) DEVICE — STERILE POLYISOPRENE POWDER-FREE SURGICAL GLOVES WITH EMOLLIENT COATING: Brand: PROTEXIS

## (undated) DEVICE — SOLUTION IRRIG 1000ML 09% SOD CHL USP PIC PLAS CONTAINER

## (undated) DEVICE — NEEDLE SPNL 25GA L2IN BLU SHT NEO LUM PUNC DISP

## (undated) DEVICE — APPLICATOR MEDICATED 10.5 CC SOLUTION HI LT ORNG CHLORAPREP

## (undated) DEVICE — 1810 FOAM BLOCK NEEDLE COUNTER: Brand: DEVON

## (undated) DEVICE — HEAD AND NECK PACK: Brand: CONVERTORS

## (undated) DEVICE — MEDI-VAC NON-CONDUCTIVE SUCTION TUBING: Brand: CARDINAL HEALTH

## (undated) DEVICE — GAUZE,SPONGE,4"X4",16PLY,XRAY,STRL,LF: Brand: MEDLINE